# Patient Record
Sex: FEMALE | Race: BLACK OR AFRICAN AMERICAN | NOT HISPANIC OR LATINO | Employment: OTHER | URBAN - METROPOLITAN AREA
[De-identification: names, ages, dates, MRNs, and addresses within clinical notes are randomized per-mention and may not be internally consistent; named-entity substitution may affect disease eponyms.]

---

## 2021-01-01 ENCOUNTER — ANESTHESIA EVENT (OUTPATIENT)
Dept: OBSERVATION | Facility: HOSPITAL | Age: 76
DRG: 252 | End: 2021-01-01
Payer: MEDICARE

## 2021-01-01 ENCOUNTER — ANESTHESIA EVENT (OUTPATIENT)
Dept: SURGERY | Facility: HOSPITAL | Age: 76
DRG: 252 | End: 2021-01-01
Payer: MEDICARE

## 2021-01-01 ENCOUNTER — LAB VISIT (OUTPATIENT)
Dept: LAB | Facility: OTHER | Age: 76
End: 2021-01-01
Payer: MEDICARE

## 2021-01-01 ENCOUNTER — HOSPITAL ENCOUNTER (EMERGENCY)
Facility: HOSPITAL | Age: 76
Discharge: HOME | End: 2021-09-01
Attending: EMERGENCY MEDICINE
Payer: MEDICARE

## 2021-01-01 ENCOUNTER — ANESTHESIA (OUTPATIENT)
Dept: SURGERY | Facility: HOSPITAL | Age: 76
DRG: 252 | End: 2021-01-01
Payer: MEDICARE

## 2021-01-01 ENCOUNTER — DOCUMENTATION ONLY (OUTPATIENT)
Dept: ORTHOPEDICS | Facility: HOSPITAL | Age: 76
End: 2021-01-01

## 2021-01-01 ENCOUNTER — HOSPITAL ENCOUNTER (OUTPATIENT)
Facility: OTHER | Age: 76
Discharge: HOME | End: 2021-07-27
Attending: EMERGENCY MEDICINE | Admitting: EMERGENCY MEDICINE
Payer: MEDICARE

## 2021-01-01 ENCOUNTER — HOSPITAL ENCOUNTER (OUTPATIENT)
Facility: HOSPITAL | Age: 76
Discharge: NURSING, REHAB OR LONG-TERM CARE | End: 2021-09-17
Attending: EMERGENCY MEDICINE | Admitting: INTERNAL MEDICINE
Payer: MEDICARE

## 2021-01-01 ENCOUNTER — ANESTHESIA (OUTPATIENT)
Dept: OBSERVATION | Facility: HOSPITAL | Age: 76
DRG: 252 | End: 2021-01-01
Payer: MEDICARE

## 2021-01-01 ENCOUNTER — HOSPITAL ENCOUNTER (EMERGENCY)
Facility: HOSPITAL | Age: 76
Discharge: HOME | End: 2021-09-09
Attending: EMERGENCY MEDICINE
Payer: MEDICARE

## 2021-01-01 ENCOUNTER — HOSPITAL ENCOUNTER (OUTPATIENT)
Facility: HOSPITAL | Age: 76
Discharge: HOME HEALTH | End: 2021-06-22
Attending: EMERGENCY MEDICINE | Admitting: INTERNAL MEDICINE
Payer: MEDICARE

## 2021-01-01 ENCOUNTER — HOSPITAL ENCOUNTER (INPATIENT)
Facility: HOSPITAL | Age: 76
LOS: 2 days | Discharge: NURSING, REHAB OR LONG-TERM CARE | DRG: 252 | End: 2021-05-28
Attending: EMERGENCY MEDICINE | Admitting: HOSPITALIST
Payer: MEDICARE

## 2021-01-01 ENCOUNTER — EXTERNAL HOME HEALTH (OUTPATIENT)
Dept: HOME HEALTH SERVICES | Facility: HOSPITAL | Age: 76
End: 2021-01-01
Payer: MEDICARE

## 2021-01-01 VITALS
RESPIRATION RATE: 20 BRPM | SYSTOLIC BLOOD PRESSURE: 122 MMHG | OXYGEN SATURATION: 96 % | TEMPERATURE: 98 F | BODY MASS INDEX: 28.95 KG/M2 | HEIGHT: 64 IN | WEIGHT: 169.56 LBS | HEART RATE: 87 BPM | DIASTOLIC BLOOD PRESSURE: 57 MMHG

## 2021-01-01 VITALS
BODY MASS INDEX: 24.15 KG/M2 | SYSTOLIC BLOOD PRESSURE: 125 MMHG | OXYGEN SATURATION: 98 % | HEART RATE: 79 BPM | WEIGHT: 123 LBS | RESPIRATION RATE: 20 BRPM | HEIGHT: 60 IN | DIASTOLIC BLOOD PRESSURE: 68 MMHG | TEMPERATURE: 98 F

## 2021-01-01 VITALS
RESPIRATION RATE: 11 BRPM | BODY MASS INDEX: 24.11 KG/M2 | HEIGHT: 60 IN | WEIGHT: 122.81 LBS | OXYGEN SATURATION: 100 % | SYSTOLIC BLOOD PRESSURE: 104 MMHG | HEART RATE: 75 BPM | DIASTOLIC BLOOD PRESSURE: 53 MMHG | TEMPERATURE: 98 F

## 2021-01-01 VITALS
BODY MASS INDEX: 31.73 KG/M2 | TEMPERATURE: 99 F | DIASTOLIC BLOOD PRESSURE: 61 MMHG | SYSTOLIC BLOOD PRESSURE: 132 MMHG | OXYGEN SATURATION: 97 % | HEIGHT: 64 IN | RESPIRATION RATE: 18 BRPM | HEART RATE: 94 BPM | WEIGHT: 185.88 LBS

## 2021-01-01 VITALS
RESPIRATION RATE: 20 BRPM | TEMPERATURE: 98 F | OXYGEN SATURATION: 100 % | SYSTOLIC BLOOD PRESSURE: 165 MMHG | HEART RATE: 82 BPM | DIASTOLIC BLOOD PRESSURE: 73 MMHG

## 2021-01-01 VITALS
OXYGEN SATURATION: 99 % | TEMPERATURE: 98 F | SYSTOLIC BLOOD PRESSURE: 144 MMHG | RESPIRATION RATE: 16 BRPM | HEART RATE: 66 BPM | WEIGHT: 149.94 LBS | DIASTOLIC BLOOD PRESSURE: 65 MMHG | BODY MASS INDEX: 26.57 KG/M2 | HEIGHT: 63 IN

## 2021-01-01 DIAGNOSIS — N25.81 SECONDARY HYPERPARATHYROIDISM OF RENAL ORIGIN: Chronic | ICD-10-CM

## 2021-01-01 DIAGNOSIS — M25.511 RIGHT SHOULDER PAIN: ICD-10-CM

## 2021-01-01 DIAGNOSIS — Z20.822 ENCOUNTER FOR LABORATORY TESTING FOR COVID-19 VIRUS: ICD-10-CM

## 2021-01-01 DIAGNOSIS — N18.6 ANEMIA DUE TO END STAGE RENAL DISEASE: Chronic | ICD-10-CM

## 2021-01-01 DIAGNOSIS — G93.40 ACUTE ENCEPHALOPATHY: ICD-10-CM

## 2021-01-01 DIAGNOSIS — E83.9 CHRONIC KIDNEY DISEASE-MINERAL AND BONE DISORDER: ICD-10-CM

## 2021-01-01 DIAGNOSIS — K21.9 GASTROESOPHAGEAL REFLUX DISEASE, UNSPECIFIED WHETHER ESOPHAGITIS PRESENT: Chronic | ICD-10-CM

## 2021-01-01 DIAGNOSIS — T82.590A AV GRAFT MALFUNCTION, INITIAL ENCOUNTER: Primary | ICD-10-CM

## 2021-01-01 DIAGNOSIS — T82.868A AV GRAFT THROMBOSIS: ICD-10-CM

## 2021-01-01 DIAGNOSIS — M25.512 LEFT SHOULDER PAIN: ICD-10-CM

## 2021-01-01 DIAGNOSIS — Z99.2 ESRD ON HEMODIALYSIS: Chronic | ICD-10-CM

## 2021-01-01 DIAGNOSIS — N18.6 END-STAGE RENAL DISEASE ON HEMODIALYSIS: Primary | ICD-10-CM

## 2021-01-01 DIAGNOSIS — E87.5 HYPERKALEMIA: ICD-10-CM

## 2021-01-01 DIAGNOSIS — N18.6 ESRD ON HEMODIALYSIS: Chronic | ICD-10-CM

## 2021-01-01 DIAGNOSIS — I10 ESSENTIAL HYPERTENSION: Chronic | ICD-10-CM

## 2021-01-01 DIAGNOSIS — T82.868A THROMBOSIS OF ARTERIOVENOUS GRAFT, INITIAL ENCOUNTER: ICD-10-CM

## 2021-01-01 DIAGNOSIS — I50.32 CHRONIC DIASTOLIC CONGESTIVE HEART FAILURE: ICD-10-CM

## 2021-01-01 DIAGNOSIS — N18.6 TYPE 2 DIABETES MELLITUS WITH CHRONIC KIDNEY DISEASE ON CHRONIC DIALYSIS, WITH LONG-TERM CURRENT USE OF INSULIN: Chronic | ICD-10-CM

## 2021-01-01 DIAGNOSIS — Z99.2 END-STAGE RENAL DISEASE ON HEMODIALYSIS: ICD-10-CM

## 2021-01-01 DIAGNOSIS — Z78.9 PROBLEM WITH VASCULAR ACCESS: ICD-10-CM

## 2021-01-01 DIAGNOSIS — E78.5 DYSLIPIDEMIA: Chronic | ICD-10-CM

## 2021-01-01 DIAGNOSIS — D63.1 ANEMIA OF CHRONIC RENAL FAILURE, STAGE 5: ICD-10-CM

## 2021-01-01 DIAGNOSIS — Z79.4 TYPE 2 DIABETES MELLITUS WITH CHRONIC KIDNEY DISEASE ON CHRONIC DIALYSIS, WITH LONG-TERM CURRENT USE OF INSULIN: Chronic | ICD-10-CM

## 2021-01-01 DIAGNOSIS — M25.519 SHOULDER PAIN: ICD-10-CM

## 2021-01-01 DIAGNOSIS — N18.6 ESRD (END STAGE RENAL DISEASE): ICD-10-CM

## 2021-01-01 DIAGNOSIS — R07.9 CHEST PAIN: ICD-10-CM

## 2021-01-01 DIAGNOSIS — Z91.89: ICD-10-CM

## 2021-01-01 DIAGNOSIS — S42.294A OTHER CLOSED NONDISPLACED FRACTURE OF PROXIMAL END OF RIGHT HUMERUS, INITIAL ENCOUNTER: ICD-10-CM

## 2021-01-01 DIAGNOSIS — I25.2 HISTORY OF MYOCARDIAL INFARCTION: Chronic | ICD-10-CM

## 2021-01-01 DIAGNOSIS — D63.1 ANEMIA DUE TO END STAGE RENAL DISEASE: Chronic | ICD-10-CM

## 2021-01-01 DIAGNOSIS — E87.5 HYPERKALEMIA: Primary | ICD-10-CM

## 2021-01-01 DIAGNOSIS — N18.6 ESRD (END STAGE RENAL DISEASE): Chronic | ICD-10-CM

## 2021-01-01 DIAGNOSIS — N18.5 ANEMIA OF CHRONIC RENAL FAILURE, STAGE 5: ICD-10-CM

## 2021-01-01 DIAGNOSIS — Z99.2 HEMODIALYSIS PATIENT: ICD-10-CM

## 2021-01-01 DIAGNOSIS — Z99.2 END-STAGE RENAL DISEASE ON HEMODIALYSIS: Primary | ICD-10-CM

## 2021-01-01 DIAGNOSIS — T82.838A BLEEDING FROM DIALYSIS SHUNT, INITIAL ENCOUNTER: Primary | ICD-10-CM

## 2021-01-01 DIAGNOSIS — D62 ACUTE BLOOD LOSS ANEMIA: ICD-10-CM

## 2021-01-01 DIAGNOSIS — T82.868S THROMBOSIS OF ARTERIOVENOUS GRAFT, SEQUELA: Chronic | ICD-10-CM

## 2021-01-01 DIAGNOSIS — N18.9 CHRONIC KIDNEY DISEASE-MINERAL AND BONE DISORDER: ICD-10-CM

## 2021-01-01 DIAGNOSIS — T82.898A AV FISTULA OCCLUSION: ICD-10-CM

## 2021-01-01 DIAGNOSIS — R41.82 AMS (ALTERED MENTAL STATUS): ICD-10-CM

## 2021-01-01 DIAGNOSIS — E11.22 TYPE 2 DIABETES MELLITUS WITH CHRONIC KIDNEY DISEASE ON CHRONIC DIALYSIS, WITH LONG-TERM CURRENT USE OF INSULIN: Chronic | ICD-10-CM

## 2021-01-01 DIAGNOSIS — I50.32 CHRONIC DIASTOLIC CONGESTIVE HEART FAILURE: Chronic | ICD-10-CM

## 2021-01-01 DIAGNOSIS — D64.9 ANEMIA, UNSPECIFIED TYPE: Primary | ICD-10-CM

## 2021-01-01 DIAGNOSIS — N18.6 TYPE 2 DIABETES MELLITUS WITH CHRONIC KIDNEY DISEASE ON CHRONIC DIALYSIS, WITHOUT LONG-TERM CURRENT USE OF INSULIN: ICD-10-CM

## 2021-01-01 DIAGNOSIS — E11.22 TYPE 2 DIABETES MELLITUS WITH CHRONIC KIDNEY DISEASE ON CHRONIC DIALYSIS, WITHOUT LONG-TERM CURRENT USE OF INSULIN: ICD-10-CM

## 2021-01-01 DIAGNOSIS — Z99.2 TYPE 2 DIABETES MELLITUS WITH CHRONIC KIDNEY DISEASE ON CHRONIC DIALYSIS, WITH LONG-TERM CURRENT USE OF INSULIN: Chronic | ICD-10-CM

## 2021-01-01 DIAGNOSIS — M89.9 CHRONIC KIDNEY DISEASE-MINERAL AND BONE DISORDER: ICD-10-CM

## 2021-01-01 DIAGNOSIS — Z99.2 TYPE 2 DIABETES MELLITUS WITH CHRONIC KIDNEY DISEASE ON CHRONIC DIALYSIS, WITHOUT LONG-TERM CURRENT USE OF INSULIN: ICD-10-CM

## 2021-01-01 DIAGNOSIS — N18.6 END-STAGE RENAL DISEASE ON HEMODIALYSIS: ICD-10-CM

## 2021-01-01 LAB
ABO + RH BLD: NORMAL
ABO + RH BLD: NORMAL
ACID FAST MOD KINY STN SPEC: NORMAL
ALBUMIN SERPL BCP-MCNC: 2.5 G/DL (ref 3.5–5.2)
ALBUMIN SERPL BCP-MCNC: 2.5 G/DL (ref 3.5–5.2)
ALBUMIN SERPL BCP-MCNC: 2.6 G/DL (ref 3.5–5.2)
ALBUMIN SERPL BCP-MCNC: 2.7 G/DL (ref 3.5–5.2)
ALBUMIN SERPL BCP-MCNC: 2.7 G/DL (ref 3.5–5.2)
ALBUMIN SERPL BCP-MCNC: 2.8 G/DL (ref 3.5–5.2)
ALBUMIN SERPL BCP-MCNC: 2.9 G/DL (ref 3.5–5.2)
ALBUMIN SERPL BCP-MCNC: 3 G/DL (ref 3.5–5.2)
ALBUMIN SERPL BCP-MCNC: 3.1 G/DL (ref 3.5–5.2)
ALLENS TEST: ABNORMAL
ALLENS TEST: ABNORMAL
ALP SERPL-CCNC: 100 U/L (ref 55–135)
ALP SERPL-CCNC: 111 U/L (ref 55–135)
ALP SERPL-CCNC: 79 U/L (ref 55–135)
ALP SERPL-CCNC: 83 U/L (ref 55–135)
ALP SERPL-CCNC: 86 U/L (ref 55–135)
ALP SERPL-CCNC: 91 U/L (ref 55–135)
ALT SERPL W/O P-5'-P-CCNC: 8 U/L (ref 10–44)
ALT SERPL W/O P-5'-P-CCNC: <5 U/L (ref 10–44)
AMMONIA PLAS-SCNC: 23 UMOL/L (ref 10–50)
AMPHET+METHAMPHET UR QL: NEGATIVE
ANION GAP SERPL CALC-SCNC: 10 MMOL/L (ref 8–16)
ANION GAP SERPL CALC-SCNC: 11 MMOL/L (ref 8–16)
ANION GAP SERPL CALC-SCNC: 11 MMOL/L (ref 8–16)
ANION GAP SERPL CALC-SCNC: 12 MMOL/L (ref 8–16)
ANION GAP SERPL CALC-SCNC: 13 MMOL/L (ref 8–16)
ANION GAP SERPL CALC-SCNC: 14 MMOL/L (ref 8–16)
ANION GAP SERPL CALC-SCNC: 15 MMOL/L (ref 8–16)
ANION GAP SERPL CALC-SCNC: 16 MMOL/L (ref 8–16)
ANION GAP SERPL CALC-SCNC: 16 MMOL/L (ref 8–16)
ANION GAP SERPL CALC-SCNC: 17 MMOL/L (ref 8–16)
ANION GAP SERPL CALC-SCNC: 18 MMOL/L (ref 8–16)
ANION GAP SERPL CALC-SCNC: 18 MMOL/L (ref 8–16)
ANION GAP SERPL CALC-SCNC: 19 MMOL/L (ref 8–16)
ANISOCYTOSIS BLD QL SMEAR: SLIGHT
APPEARANCE FLD: NORMAL
AST SERPL-CCNC: 10 U/L (ref 10–40)
AST SERPL-CCNC: 11 U/L (ref 10–40)
AST SERPL-CCNC: 14 U/L (ref 10–40)
AST SERPL-CCNC: 5 U/L (ref 10–40)
AST SERPL-CCNC: 8 U/L (ref 10–40)
AST SERPL-CCNC: 9 U/L (ref 10–40)
BACTERIA #/AREA URNS AUTO: ABNORMAL /HPF
BACTERIA BLD CULT: ABNORMAL
BACTERIA BLD CULT: NORMAL
BACTERIA SPEC AEROBE CULT: NO GROWTH
BACTERIA SPEC ANAEROBE CULT: ABNORMAL
BACTERIA UR CULT: ABNORMAL
BARBITURATES UR QL SCN>200 NG/ML: NEGATIVE
BASOPHILS # BLD AUTO: 0.02 K/UL (ref 0–0.2)
BASOPHILS # BLD AUTO: 0.02 K/UL (ref 0–0.2)
BASOPHILS # BLD AUTO: 0.03 K/UL (ref 0–0.2)
BASOPHILS # BLD AUTO: 0.04 K/UL (ref 0–0.2)
BASOPHILS # BLD AUTO: 0.05 K/UL (ref 0–0.2)
BASOPHILS # BLD AUTO: 0.06 K/UL (ref 0–0.2)
BASOPHILS NFR BLD: 0.4 % (ref 0–1.9)
BASOPHILS NFR BLD: 0.5 % (ref 0–1.9)
BASOPHILS NFR BLD: 0.6 % (ref 0–1.9)
BASOPHILS NFR BLD: 0.7 % (ref 0–1.9)
BASOPHILS NFR BLD: 0.8 % (ref 0–1.9)
BASOPHILS NFR BLD: 0.9 % (ref 0–1.9)
BASOPHILS NFR BLD: 1 % (ref 0–1.9)
BASOPHILS NFR BLD: 1.1 % (ref 0–1.9)
BENZODIAZ UR QL SCN>200 NG/ML: NEGATIVE
BILIRUB SERPL-MCNC: 0.3 MG/DL (ref 0.1–1)
BILIRUB SERPL-MCNC: 0.4 MG/DL (ref 0.1–1)
BILIRUB SERPL-MCNC: 0.5 MG/DL (ref 0.1–1)
BILIRUB UR QL STRIP: NEGATIVE
BLD GP AB SCN CELLS X3 SERPL QL: NORMAL
BLD GP AB SCN CELLS X3 SERPL QL: NORMAL
BLD PROD TYP BPU: NORMAL
BLD PROD TYP BPU: NORMAL
BLOOD UNIT EXPIRATION DATE: NORMAL
BLOOD UNIT EXPIRATION DATE: NORMAL
BLOOD UNIT TYPE CODE: 6200
BLOOD UNIT TYPE CODE: 6200
BLOOD UNIT TYPE: NORMAL
BLOOD UNIT TYPE: NORMAL
BNP SERPL-MCNC: 2647 PG/ML (ref 0–99)
BODY FLD TYPE: NORMAL
BODY FLD TYPE: NORMAL
BUN SERPL-MCNC: 20 MG/DL (ref 8–23)
BUN SERPL-MCNC: 25 MG/DL (ref 8–23)
BUN SERPL-MCNC: 25 MG/DL (ref 8–23)
BUN SERPL-MCNC: 27 MG/DL (ref 8–23)
BUN SERPL-MCNC: 28 MG/DL (ref 8–23)
BUN SERPL-MCNC: 32 MG/DL (ref 8–23)
BUN SERPL-MCNC: 33 MG/DL (ref 8–23)
BUN SERPL-MCNC: 34 MG/DL (ref 8–23)
BUN SERPL-MCNC: 38 MG/DL (ref 8–23)
BUN SERPL-MCNC: 42 MG/DL (ref 8–23)
BUN SERPL-MCNC: 43 MG/DL (ref 8–23)
BUN SERPL-MCNC: 44 MG/DL (ref 6–30)
BUN SERPL-MCNC: 44 MG/DL (ref 8–23)
BUN SERPL-MCNC: 44 MG/DL (ref 8–23)
BUN SERPL-MCNC: 45 MG/DL (ref 8–23)
BUN SERPL-MCNC: 45 MG/DL (ref 8–23)
BUN SERPL-MCNC: 47 MG/DL (ref 8–23)
BUN SERPL-MCNC: 48 MG/DL (ref 8–23)
BUN SERPL-MCNC: 66 MG/DL (ref 6–30)
BUN SERPL-MCNC: 67 MG/DL (ref 8–23)
BUN SERPL-MCNC: 67 MG/DL (ref 8–23)
BUN SERPL-MCNC: 75 MG/DL (ref 8–23)
BUN SERPL-MCNC: 78 MG/DL (ref 8–23)
BURR CELLS BLD QL SMEAR: ABNORMAL
BZE UR QL SCN: NEGATIVE
CALCIUM SERPL-MCNC: 7.9 MG/DL (ref 8.7–10.5)
CALCIUM SERPL-MCNC: 8 MG/DL (ref 8.7–10.5)
CALCIUM SERPL-MCNC: 8.5 MG/DL (ref 8.7–10.5)
CALCIUM SERPL-MCNC: 8.5 MG/DL (ref 8.7–10.5)
CALCIUM SERPL-MCNC: 8.6 MG/DL (ref 8.7–10.5)
CALCIUM SERPL-MCNC: 8.7 MG/DL (ref 8.7–10.5)
CALCIUM SERPL-MCNC: 8.7 MG/DL (ref 8.7–10.5)
CALCIUM SERPL-MCNC: 8.8 MG/DL (ref 8.7–10.5)
CALCIUM SERPL-MCNC: 8.9 MG/DL (ref 8.7–10.5)
CALCIUM SERPL-MCNC: 9 MG/DL (ref 8.7–10.5)
CALCIUM SERPL-MCNC: 9.1 MG/DL (ref 8.7–10.5)
CALCIUM SERPL-MCNC: 9.2 MG/DL (ref 8.7–10.5)
CALCIUM SERPL-MCNC: 9.3 MG/DL (ref 8.7–10.5)
CALCIUM SERPL-MCNC: 9.3 MG/DL (ref 8.7–10.5)
CALCIUM SERPL-MCNC: 9.4 MG/DL (ref 8.7–10.5)
CALCIUM SERPL-MCNC: 9.5 MG/DL (ref 8.7–10.5)
CANNABINOIDS UR QL SCN: NEGATIVE
CHLORIDE SERPL-SCNC: 100 MMOL/L (ref 95–110)
CHLORIDE SERPL-SCNC: 101 MMOL/L (ref 95–110)
CHLORIDE SERPL-SCNC: 102 MMOL/L (ref 95–110)
CHLORIDE SERPL-SCNC: 103 MMOL/L (ref 95–110)
CHLORIDE SERPL-SCNC: 104 MMOL/L (ref 95–110)
CHLORIDE SERPL-SCNC: 105 MMOL/L (ref 95–110)
CHLORIDE SERPL-SCNC: 106 MMOL/L (ref 95–110)
CHLORIDE SERPL-SCNC: 107 MMOL/L (ref 95–110)
CHLORIDE SERPL-SCNC: 97 MMOL/L (ref 95–110)
CHLORIDE SERPL-SCNC: 98 MMOL/L (ref 95–110)
CHLORIDE SERPL-SCNC: 98 MMOL/L (ref 95–110)
CHLORIDE SERPL-SCNC: 99 MMOL/L (ref 95–110)
CHLORIDE SERPL-SCNC: 99 MMOL/L (ref 95–110)
CLARITY UR REFRACT.AUTO: ABNORMAL
CO2 SERPL-SCNC: 19 MMOL/L (ref 23–29)
CO2 SERPL-SCNC: 20 MMOL/L (ref 23–29)
CO2 SERPL-SCNC: 21 MMOL/L (ref 23–29)
CO2 SERPL-SCNC: 22 MMOL/L (ref 23–29)
CO2 SERPL-SCNC: 22 MMOL/L (ref 23–29)
CO2 SERPL-SCNC: 23 MMOL/L (ref 23–29)
CO2 SERPL-SCNC: 24 MMOL/L (ref 23–29)
CO2 SERPL-SCNC: 25 MMOL/L (ref 23–29)
CO2 SERPL-SCNC: 25 MMOL/L (ref 23–29)
CO2 SERPL-SCNC: 26 MMOL/L (ref 23–29)
CO2 SERPL-SCNC: 27 MMOL/L (ref 23–29)
CO2 SERPL-SCNC: 28 MMOL/L (ref 23–29)
CO2 SERPL-SCNC: 30 MMOL/L (ref 23–29)
CODING SYSTEM: NORMAL
CODING SYSTEM: NORMAL
COLOR FLD: NORMAL
COLOR UR AUTO: YELLOW
CREAT SERPL-MCNC: 11.2 MG/DL (ref 0.5–1.4)
CREAT SERPL-MCNC: 11.6 MG/DL (ref 0.5–1.4)
CREAT SERPL-MCNC: 11.9 MG/DL (ref 0.5–1.4)
CREAT SERPL-MCNC: 12 MG/DL (ref 0.5–1.4)
CREAT SERPL-MCNC: 12.5 MG/DL (ref 0.5–1.4)
CREAT SERPL-MCNC: 4.9 MG/DL (ref 0.5–1.4)
CREAT SERPL-MCNC: 5.6 MG/DL (ref 0.5–1.4)
CREAT SERPL-MCNC: 5.8 MG/DL (ref 0.5–1.4)
CREAT SERPL-MCNC: 5.9 MG/DL (ref 0.5–1.4)
CREAT SERPL-MCNC: 6.2 MG/DL (ref 0.5–1.4)
CREAT SERPL-MCNC: 6.9 MG/DL (ref 0.5–1.4)
CREAT SERPL-MCNC: 6.9 MG/DL (ref 0.5–1.4)
CREAT SERPL-MCNC: 7 MG/DL (ref 0.5–1.4)
CREAT SERPL-MCNC: 7.5 MG/DL (ref 0.5–1.4)
CREAT SERPL-MCNC: 7.8 MG/DL (ref 0.5–1.4)
CREAT SERPL-MCNC: 8 MG/DL (ref 0.5–1.4)
CREAT SERPL-MCNC: 8.5 MG/DL (ref 0.5–1.4)
CREAT SERPL-MCNC: 8.5 MG/DL (ref 0.5–1.4)
CREAT SERPL-MCNC: 8.9 MG/DL (ref 0.5–1.4)
CREAT SERPL-MCNC: 9 MG/DL (ref 0.5–1.4)
CREAT SERPL-MCNC: 9.1 MG/DL (ref 0.5–1.4)
CREAT SERPL-MCNC: 9.3 MG/DL (ref 0.5–1.4)
CREAT SERPL-MCNC: 9.7 MG/DL (ref 0.5–1.4)
CREAT UR-MCNC: <5 MG/DL (ref 15–325)
CRP SERPL-MCNC: 79.4 MG/L (ref 0–8.2)
CRYSTALS FLD MICRO: NEGATIVE
CTP QC/QA: YES
DIFFERENTIAL METHOD: ABNORMAL
DISPENSE STATUS: NORMAL
DISPENSE STATUS: NORMAL
EOSINOPHIL # BLD AUTO: 0.2 K/UL (ref 0–0.5)
EOSINOPHIL # BLD AUTO: 0.3 K/UL (ref 0–0.5)
EOSINOPHIL # BLD AUTO: 0.4 K/UL (ref 0–0.5)
EOSINOPHIL # BLD AUTO: 0.4 K/UL (ref 0–0.5)
EOSINOPHIL NFR BLD: 2.3 % (ref 0–8)
EOSINOPHIL NFR BLD: 3.2 % (ref 0–8)
EOSINOPHIL NFR BLD: 3.6 % (ref 0–8)
EOSINOPHIL NFR BLD: 4 % (ref 0–8)
EOSINOPHIL NFR BLD: 4.1 % (ref 0–8)
EOSINOPHIL NFR BLD: 4.2 % (ref 0–8)
EOSINOPHIL NFR BLD: 4.6 % (ref 0–8)
EOSINOPHIL NFR BLD: 4.7 % (ref 0–8)
EOSINOPHIL NFR BLD: 4.7 % (ref 0–8)
EOSINOPHIL NFR BLD: 4.8 % (ref 0–8)
EOSINOPHIL NFR BLD: 5.1 % (ref 0–8)
EOSINOPHIL NFR BLD: 5.3 % (ref 0–8)
EOSINOPHIL NFR BLD: 5.9 % (ref 0–8)
EOSINOPHIL NFR BLD: 6 % (ref 0–8)
EOSINOPHIL NFR BLD: 6.1 % (ref 0–8)
EOSINOPHIL NFR BLD: 6.4 % (ref 0–8)
EOSINOPHIL NFR BLD: 7 % (ref 0–8)
EOSINOPHIL NFR BLD: 7.1 % (ref 0–8)
EOSINOPHIL NFR BLD: 7.5 % (ref 0–8)
EOSINOPHIL NFR BLD: 8 % (ref 0–8)
EOSINOPHIL NFR FLD MANUAL: 12 %
ERYTHROCYTE [DISTWIDTH] IN BLOOD BY AUTOMATED COUNT: 13.9 % (ref 11.5–14.5)
ERYTHROCYTE [DISTWIDTH] IN BLOOD BY AUTOMATED COUNT: 14.2 % (ref 11.5–14.5)
ERYTHROCYTE [DISTWIDTH] IN BLOOD BY AUTOMATED COUNT: 14.2 % (ref 11.5–14.5)
ERYTHROCYTE [DISTWIDTH] IN BLOOD BY AUTOMATED COUNT: 14.3 % (ref 11.5–14.5)
ERYTHROCYTE [DISTWIDTH] IN BLOOD BY AUTOMATED COUNT: 14.5 % (ref 11.5–14.5)
ERYTHROCYTE [DISTWIDTH] IN BLOOD BY AUTOMATED COUNT: 14.9 % (ref 11.5–14.5)
ERYTHROCYTE [DISTWIDTH] IN BLOOD BY AUTOMATED COUNT: 15.1 % (ref 11.5–14.5)
ERYTHROCYTE [DISTWIDTH] IN BLOOD BY AUTOMATED COUNT: 15.2 % (ref 11.5–14.5)
ERYTHROCYTE [DISTWIDTH] IN BLOOD BY AUTOMATED COUNT: 15.2 % (ref 11.5–14.5)
ERYTHROCYTE [DISTWIDTH] IN BLOOD BY AUTOMATED COUNT: 15.3 % (ref 11.5–14.5)
ERYTHROCYTE [DISTWIDTH] IN BLOOD BY AUTOMATED COUNT: 15.5 % (ref 11.5–14.5)
ERYTHROCYTE [DISTWIDTH] IN BLOOD BY AUTOMATED COUNT: 15.7 % (ref 11.5–14.5)
ERYTHROCYTE [DISTWIDTH] IN BLOOD BY AUTOMATED COUNT: 15.8 % (ref 11.5–14.5)
ERYTHROCYTE [DISTWIDTH] IN BLOOD BY AUTOMATED COUNT: 15.9 % (ref 11.5–14.5)
ERYTHROCYTE [DISTWIDTH] IN BLOOD BY AUTOMATED COUNT: 15.9 % (ref 11.5–14.5)
ERYTHROCYTE [DISTWIDTH] IN BLOOD BY AUTOMATED COUNT: 16 % (ref 11.5–14.5)
ERYTHROCYTE [DISTWIDTH] IN BLOOD BY AUTOMATED COUNT: 16 % (ref 11.5–14.5)
ERYTHROCYTE [DISTWIDTH] IN BLOOD BY AUTOMATED COUNT: 16.3 % (ref 11.5–14.5)
ERYTHROCYTE [DISTWIDTH] IN BLOOD BY AUTOMATED COUNT: 16.4 % (ref 11.5–14.5)
ERYTHROCYTE [SEDIMENTATION RATE] IN BLOOD BY WESTERGREN METHOD: 94 MM/HR (ref 0–36)
EST. GFR  (AFRICAN AMERICAN): 2.9 ML/MIN/1.73 M^2
EST. GFR  (AFRICAN AMERICAN): 3.1 ML/MIN/1.73 M^2
EST. GFR  (AFRICAN AMERICAN): 3.2 ML/MIN/1.73 M^2
EST. GFR  (AFRICAN AMERICAN): 3.4 ML/MIN/1.73 M^2
EST. GFR  (AFRICAN AMERICAN): 4 ML/MIN/1.73 M^2
EST. GFR  (AFRICAN AMERICAN): 4.2 ML/MIN/1.73 M^2
EST. GFR  (AFRICAN AMERICAN): 4.4 ML/MIN/1.73 M^2
EST. GFR  (AFRICAN AMERICAN): 4.4 ML/MIN/1.73 M^2
EST. GFR  (AFRICAN AMERICAN): 4.7 ML/MIN/1.73 M^2
EST. GFR  (AFRICAN AMERICAN): 5 ML/MIN/1.73 M^2
EST. GFR  (AFRICAN AMERICAN): 5.5 ML/MIN/1.73 M^2
EST. GFR  (AFRICAN AMERICAN): 5.9 ML/MIN/1.73 M^2
EST. GFR  (AFRICAN AMERICAN): 6 ML/MIN/1.73 M^2
EST. GFR  (AFRICAN AMERICAN): 6 ML/MIN/1.73 M^2
EST. GFR  (AFRICAN AMERICAN): 6.9 ML/MIN/1.73 M^2
EST. GFR  (AFRICAN AMERICAN): 7.3 ML/MIN/1.73 M^2
EST. GFR  (AFRICAN AMERICAN): 7.4 ML/MIN/1.73 M^2
EST. GFR  (AFRICAN AMERICAN): 7.8 ML/MIN/1.73 M^2
EST. GFR  (AFRICAN AMERICAN): 9.1 ML/MIN/1.73 M^2
EST. GFR  (NON AFRICAN AMERICAN): 2.6 ML/MIN/1.73 M^2
EST. GFR  (NON AFRICAN AMERICAN): 2.7 ML/MIN/1.73 M^2
EST. GFR  (NON AFRICAN AMERICAN): 2.8 ML/MIN/1.73 M^2
EST. GFR  (NON AFRICAN AMERICAN): 2.9 ML/MIN/1.73 M^2
EST. GFR  (NON AFRICAN AMERICAN): 3.5 ML/MIN/1.73 M^2
EST. GFR  (NON AFRICAN AMERICAN): 3.6 ML/MIN/1.73 M^2
EST. GFR  (NON AFRICAN AMERICAN): 3.8 ML/MIN/1.73 M^2
EST. GFR  (NON AFRICAN AMERICAN): 3.8 ML/MIN/1.73 M^2
EST. GFR  (NON AFRICAN AMERICAN): 4 ML/MIN/1.73 M^2
EST. GFR  (NON AFRICAN AMERICAN): 4.1 ML/MIN/1.73 M^2
EST. GFR  (NON AFRICAN AMERICAN): 4.4 ML/MIN/1.73 M^2
EST. GFR  (NON AFRICAN AMERICAN): 4.7 ML/MIN/1.73 M^2
EST. GFR  (NON AFRICAN AMERICAN): 5 ML/MIN/1.73 M^2
EST. GFR  (NON AFRICAN AMERICAN): 5 ML/MIN/1.73 M^2
EST. GFR  (NON AFRICAN AMERICAN): 5.1 ML/MIN/1.73 M^2
EST. GFR  (NON AFRICAN AMERICAN): 5.2 ML/MIN/1.73 M^2
EST. GFR  (NON AFRICAN AMERICAN): 6 ML/MIN/1.73 M^2
EST. GFR  (NON AFRICAN AMERICAN): 6.3 ML/MIN/1.73 M^2
EST. GFR  (NON AFRICAN AMERICAN): 6.5 ML/MIN/1.73 M^2
EST. GFR  (NON AFRICAN AMERICAN): 6.7 ML/MIN/1.73 M^2
EST. GFR  (NON AFRICAN AMERICAN): 7.9 ML/MIN/1.73 M^2
ESTIMATED AVG GLUCOSE: 123 MG/DL (ref 68–131)
FUNGUS SPEC CULT: NORMAL
GLUCOSE SERPL-MCNC: 104 MG/DL (ref 70–110)
GLUCOSE SERPL-MCNC: 108 MG/DL (ref 70–110)
GLUCOSE SERPL-MCNC: 109 MG/DL (ref 70–110)
GLUCOSE SERPL-MCNC: 111 MG/DL (ref 70–110)
GLUCOSE SERPL-MCNC: 115 MG/DL (ref 70–110)
GLUCOSE SERPL-MCNC: 122 MG/DL (ref 70–110)
GLUCOSE SERPL-MCNC: 127 MG/DL (ref 70–110)
GLUCOSE SERPL-MCNC: 139 MG/DL (ref 70–110)
GLUCOSE SERPL-MCNC: 141 MG/DL (ref 70–110)
GLUCOSE SERPL-MCNC: 168 MG/DL (ref 70–110)
GLUCOSE SERPL-MCNC: 185 MG/DL (ref 70–110)
GLUCOSE SERPL-MCNC: 212 MG/DL (ref 70–110)
GLUCOSE SERPL-MCNC: 227 MG/DL (ref 70–110)
GLUCOSE SERPL-MCNC: 236 MG/DL (ref 70–110)
GLUCOSE SERPL-MCNC: 47 MG/DL (ref 70–110)
GLUCOSE SERPL-MCNC: 65 MG/DL (ref 70–110)
GLUCOSE SERPL-MCNC: 71 MG/DL (ref 70–110)
GLUCOSE SERPL-MCNC: 78 MG/DL (ref 70–110)
GLUCOSE SERPL-MCNC: 79 MG/DL (ref 70–110)
GLUCOSE SERPL-MCNC: 83 MG/DL (ref 70–110)
GLUCOSE SERPL-MCNC: 87 MG/DL (ref 70–110)
GLUCOSE SERPL-MCNC: 90 MG/DL (ref 70–110)
GLUCOSE SERPL-MCNC: 93 MG/DL (ref 70–110)
GLUCOSE SERPL-MCNC: 97 MG/DL (ref 70–110)
GLUCOSE UR QL STRIP: NEGATIVE
GRAM STN SPEC: NORMAL
GRAM STN SPEC: NORMAL
HBA1C MFR BLD: 5.9 % (ref 4–5.6)
HCO3 UR-SCNC: 25.6 MMOL/L (ref 24–28)
HCO3 UR-SCNC: 28.2 MMOL/L (ref 24–28)
HCT VFR BLD AUTO: 19.8 % (ref 37–48.5)
HCT VFR BLD AUTO: 21.1 % (ref 37–48.5)
HCT VFR BLD AUTO: 21.5 % (ref 37–48.5)
HCT VFR BLD AUTO: 22.3 % (ref 37–48.5)
HCT VFR BLD AUTO: 24.6 % (ref 37–48.5)
HCT VFR BLD AUTO: 24.8 % (ref 37–48.5)
HCT VFR BLD AUTO: 26.9 % (ref 37–48.5)
HCT VFR BLD AUTO: 27.1 % (ref 37–48.5)
HCT VFR BLD AUTO: 27.7 % (ref 37–48.5)
HCT VFR BLD AUTO: 27.7 % (ref 37–48.5)
HCT VFR BLD AUTO: 27.8 % (ref 37–48.5)
HCT VFR BLD AUTO: 28.3 % (ref 37–48.5)
HCT VFR BLD AUTO: 29.5 % (ref 37–48.5)
HCT VFR BLD AUTO: 29.5 % (ref 37–48.5)
HCT VFR BLD AUTO: 30.1 % (ref 37–48.5)
HCT VFR BLD AUTO: 30.7 % (ref 37–48.5)
HCT VFR BLD AUTO: 31.2 % (ref 37–48.5)
HCT VFR BLD AUTO: 31.5 % (ref 37–48.5)
HCT VFR BLD AUTO: 32.7 % (ref 37–48.5)
HCT VFR BLD AUTO: 32.9 % (ref 37–48.5)
HCT VFR BLD AUTO: 38.5 % (ref 37–48.5)
HCT VFR BLD CALC: 22 %PCV (ref 36–54)
HCT VFR BLD CALC: 29 %PCV (ref 36–54)
HCV AB SERPL QL IA: NEGATIVE
HGB BLD-MCNC: 10.2 G/DL (ref 12–16)
HGB BLD-MCNC: 12 G/DL (ref 12–16)
HGB BLD-MCNC: 6.1 G/DL (ref 12–16)
HGB BLD-MCNC: 6.3 G/DL (ref 12–16)
HGB BLD-MCNC: 6.7 G/DL (ref 12–16)
HGB BLD-MCNC: 6.7 G/DL (ref 12–16)
HGB BLD-MCNC: 7.7 G/DL (ref 12–16)
HGB BLD-MCNC: 7.7 G/DL (ref 12–16)
HGB BLD-MCNC: 8.3 G/DL (ref 12–16)
HGB BLD-MCNC: 8.4 G/DL (ref 12–16)
HGB BLD-MCNC: 8.5 G/DL (ref 12–16)
HGB BLD-MCNC: 8.6 G/DL (ref 12–16)
HGB BLD-MCNC: 8.7 G/DL (ref 12–16)
HGB BLD-MCNC: 8.9 G/DL (ref 12–16)
HGB BLD-MCNC: 9.2 G/DL (ref 12–16)
HGB BLD-MCNC: 9.2 G/DL (ref 12–16)
HGB BLD-MCNC: 9.5 G/DL (ref 12–16)
HGB BLD-MCNC: 9.6 G/DL (ref 12–16)
HGB BLD-MCNC: 9.7 G/DL (ref 12–16)
HGB BLD-MCNC: 9.8 G/DL (ref 12–16)
HGB BLD-MCNC: 9.9 G/DL (ref 12–16)
HGB UR QL STRIP: ABNORMAL
HYALINE CASTS UR QL AUTO: 0 /LPF
HYPOCHROMIA BLD QL SMEAR: ABNORMAL
IMM GRANULOCYTES # BLD AUTO: 0.01 K/UL (ref 0–0.04)
IMM GRANULOCYTES # BLD AUTO: 0.02 K/UL (ref 0–0.04)
IMM GRANULOCYTES # BLD AUTO: 0.03 K/UL (ref 0–0.04)
IMM GRANULOCYTES # BLD AUTO: 0.04 K/UL (ref 0–0.04)
IMM GRANULOCYTES # BLD AUTO: 0.05 K/UL (ref 0–0.04)
IMM GRANULOCYTES # BLD AUTO: 0.06 K/UL (ref 0–0.04)
IMM GRANULOCYTES NFR BLD AUTO: 0.2 % (ref 0–0.5)
IMM GRANULOCYTES NFR BLD AUTO: 0.3 % (ref 0–0.5)
IMM GRANULOCYTES NFR BLD AUTO: 0.3 % (ref 0–0.5)
IMM GRANULOCYTES NFR BLD AUTO: 0.4 % (ref 0–0.5)
IMM GRANULOCYTES NFR BLD AUTO: 0.5 % (ref 0–0.5)
IMM GRANULOCYTES NFR BLD AUTO: 0.6 % (ref 0–0.5)
IMM GRANULOCYTES NFR BLD AUTO: 0.7 % (ref 0–0.5)
IMM GRANULOCYTES NFR BLD AUTO: 0.8 % (ref 0–0.5)
KETONES UR QL STRIP: ABNORMAL
LACTATE SERPL-SCNC: 0.8 MMOL/L (ref 0.5–2.2)
LACTATE SERPL-SCNC: 0.8 MMOL/L (ref 0.5–2.2)
LEUKOCYTE ESTERASE UR QL STRIP: ABNORMAL
LYMPHOCYTES # BLD AUTO: 0.7 K/UL (ref 1–4.8)
LYMPHOCYTES # BLD AUTO: 0.8 K/UL (ref 1–4.8)
LYMPHOCYTES # BLD AUTO: 0.9 K/UL (ref 1–4.8)
LYMPHOCYTES # BLD AUTO: 0.9 K/UL (ref 1–4.8)
LYMPHOCYTES # BLD AUTO: 1 K/UL (ref 1–4.8)
LYMPHOCYTES # BLD AUTO: 1.1 K/UL (ref 1–4.8)
LYMPHOCYTES # BLD AUTO: 1.2 K/UL (ref 1–4.8)
LYMPHOCYTES # BLD AUTO: 1.2 K/UL (ref 1–4.8)
LYMPHOCYTES # BLD AUTO: 1.3 K/UL (ref 1–4.8)
LYMPHOCYTES NFR BLD: 13.7 % (ref 18–48)
LYMPHOCYTES NFR BLD: 14.8 % (ref 18–48)
LYMPHOCYTES NFR BLD: 15.4 % (ref 18–48)
LYMPHOCYTES NFR BLD: 16.6 % (ref 18–48)
LYMPHOCYTES NFR BLD: 16.9 % (ref 18–48)
LYMPHOCYTES NFR BLD: 17.5 % (ref 18–48)
LYMPHOCYTES NFR BLD: 18.5 % (ref 18–48)
LYMPHOCYTES NFR BLD: 18.7 % (ref 18–48)
LYMPHOCYTES NFR BLD: 19.7 % (ref 18–48)
LYMPHOCYTES NFR BLD: 20.8 % (ref 18–48)
LYMPHOCYTES NFR BLD: 21.4 % (ref 18–48)
LYMPHOCYTES NFR BLD: 21.4 % (ref 18–48)
LYMPHOCYTES NFR BLD: 21.9 % (ref 18–48)
LYMPHOCYTES NFR BLD: 23.2 % (ref 18–48)
LYMPHOCYTES NFR BLD: 23.4 % (ref 18–48)
LYMPHOCYTES NFR BLD: 25.3 % (ref 18–48)
LYMPHOCYTES NFR BLD: 25.6 % (ref 18–48)
LYMPHOCYTES NFR BLD: 27 % (ref 18–48)
LYMPHOCYTES NFR BLD: 29.5 % (ref 18–48)
LYMPHOCYTES NFR BLD: 9.3 % (ref 18–48)
LYMPHOCYTES NFR FLD MANUAL: 9 %
MAGNESIUM SERPL-MCNC: 2 MG/DL (ref 1.6–2.6)
MAGNESIUM SERPL-MCNC: 2 MG/DL (ref 1.6–2.6)
MAGNESIUM SERPL-MCNC: 2.2 MG/DL (ref 1.6–2.6)
MAGNESIUM SERPL-MCNC: 2.2 MG/DL (ref 1.6–2.6)
MAGNESIUM SERPL-MCNC: 2.3 MG/DL (ref 1.6–2.6)
MAGNESIUM SERPL-MCNC: 2.4 MG/DL (ref 1.6–2.6)
MAGNESIUM SERPL-MCNC: 2.4 MG/DL (ref 1.6–2.6)
MAGNESIUM SERPL-MCNC: 2.5 MG/DL (ref 1.6–2.6)
MCH RBC QN AUTO: 27.6 PG (ref 27–31)
MCH RBC QN AUTO: 28 PG (ref 27–31)
MCH RBC QN AUTO: 28.1 PG (ref 27–31)
MCH RBC QN AUTO: 28.1 PG (ref 27–31)
MCH RBC QN AUTO: 28.2 PG (ref 27–31)
MCH RBC QN AUTO: 28.5 PG (ref 27–31)
MCH RBC QN AUTO: 28.5 PG (ref 27–31)
MCH RBC QN AUTO: 28.9 PG (ref 27–31)
MCH RBC QN AUTO: 29 PG (ref 27–31)
MCH RBC QN AUTO: 29 PG (ref 27–31)
MCH RBC QN AUTO: 29.1 PG (ref 27–31)
MCH RBC QN AUTO: 29.1 PG (ref 27–31)
MCH RBC QN AUTO: 29.2 PG (ref 27–31)
MCH RBC QN AUTO: 29.4 PG (ref 27–31)
MCH RBC QN AUTO: 29.6 PG (ref 27–31)
MCH RBC QN AUTO: 29.6 PG (ref 27–31)
MCH RBC QN AUTO: 29.8 PG (ref 27–31)
MCH RBC QN AUTO: 29.8 PG (ref 27–31)
MCH RBC QN AUTO: 30 PG (ref 27–31)
MCH RBC QN AUTO: 30.2 PG (ref 27–31)
MCH RBC QN AUTO: 30.2 PG (ref 27–31)
MCHC RBC AUTO-ENTMCNC: 29.9 G/DL (ref 32–36)
MCHC RBC AUTO-ENTMCNC: 30 G/DL (ref 32–36)
MCHC RBC AUTO-ENTMCNC: 30 G/DL (ref 32–36)
MCHC RBC AUTO-ENTMCNC: 30.1 G/DL (ref 32–36)
MCHC RBC AUTO-ENTMCNC: 30.4 G/DL (ref 32–36)
MCHC RBC AUTO-ENTMCNC: 30.6 G/DL (ref 32–36)
MCHC RBC AUTO-ENTMCNC: 30.7 G/DL (ref 32–36)
MCHC RBC AUTO-ENTMCNC: 30.8 G/DL (ref 32–36)
MCHC RBC AUTO-ENTMCNC: 30.8 G/DL (ref 32–36)
MCHC RBC AUTO-ENTMCNC: 31 G/DL (ref 32–36)
MCHC RBC AUTO-ENTMCNC: 31.2 G/DL (ref 32–36)
MCHC RBC AUTO-ENTMCNC: 31.3 G/DL (ref 32–36)
MCHC RBC AUTO-ENTMCNC: 31.3 G/DL (ref 32–36)
MCHC RBC AUTO-ENTMCNC: 31.7 G/DL (ref 32–36)
MCHC RBC AUTO-ENTMCNC: 32.1 G/DL (ref 32–36)
MCHC RBC AUTO-ENTMCNC: 32.6 G/DL (ref 32–36)
MCV RBC AUTO: 90 FL (ref 82–98)
MCV RBC AUTO: 90 FL (ref 82–98)
MCV RBC AUTO: 91 FL (ref 82–98)
MCV RBC AUTO: 93 FL (ref 82–98)
MCV RBC AUTO: 94 FL (ref 82–98)
MCV RBC AUTO: 95 FL (ref 82–98)
MCV RBC AUTO: 96 FL (ref 82–98)
MCV RBC AUTO: 97 FL (ref 82–98)
MCV RBC AUTO: 97 FL (ref 82–98)
MCV RBC AUTO: 99 FL (ref 82–98)
METHADONE UR QL SCN>300 NG/ML: NEGATIVE
MICROSCOPIC COMMENT: ABNORMAL
MONOCYTES # BLD AUTO: 0.3 K/UL (ref 0.3–1)
MONOCYTES # BLD AUTO: 0.4 K/UL (ref 0.3–1)
MONOCYTES # BLD AUTO: 0.4 K/UL (ref 0.3–1)
MONOCYTES # BLD AUTO: 0.5 K/UL (ref 0.3–1)
MONOCYTES # BLD AUTO: 0.6 K/UL (ref 0.3–1)
MONOCYTES # BLD AUTO: 0.7 K/UL (ref 0.3–1)
MONOCYTES # BLD AUTO: 0.8 K/UL (ref 0.3–1)
MONOCYTES NFR BLD: 11.4 % (ref 4–15)
MONOCYTES NFR BLD: 11.5 % (ref 4–15)
MONOCYTES NFR BLD: 11.8 % (ref 4–15)
MONOCYTES NFR BLD: 11.8 % (ref 4–15)
MONOCYTES NFR BLD: 11.9 % (ref 4–15)
MONOCYTES NFR BLD: 12.2 % (ref 4–15)
MONOCYTES NFR BLD: 12.3 % (ref 4–15)
MONOCYTES NFR BLD: 12.5 % (ref 4–15)
MONOCYTES NFR BLD: 12.6 % (ref 4–15)
MONOCYTES NFR BLD: 12.9 % (ref 4–15)
MONOCYTES NFR BLD: 13.3 % (ref 4–15)
MONOCYTES NFR BLD: 4.2 % (ref 4–15)
MONOCYTES NFR BLD: 8.4 % (ref 4–15)
MONOCYTES NFR BLD: 8.7 % (ref 4–15)
MONOCYTES NFR BLD: 8.8 % (ref 4–15)
MONOCYTES NFR BLD: 8.9 % (ref 4–15)
MONOCYTES NFR BLD: 8.9 % (ref 4–15)
MONOCYTES NFR BLD: 9.3 % (ref 4–15)
MONOCYTES NFR BLD: 9.7 % (ref 4–15)
MONOCYTES NFR BLD: 9.9 % (ref 4–15)
MONOS+MACROS NFR FLD MANUAL: 13 %
MYCOBACTERIUM SPEC QL CULT: NORMAL
NEUTROPHILS # BLD AUTO: 2 K/UL (ref 1.8–7.7)
NEUTROPHILS # BLD AUTO: 2.3 K/UL (ref 1.8–7.7)
NEUTROPHILS # BLD AUTO: 2.5 K/UL (ref 1.8–7.7)
NEUTROPHILS # BLD AUTO: 2.7 K/UL (ref 1.8–7.7)
NEUTROPHILS # BLD AUTO: 2.8 K/UL (ref 1.8–7.7)
NEUTROPHILS # BLD AUTO: 2.8 K/UL (ref 1.8–7.7)
NEUTROPHILS # BLD AUTO: 2.9 K/UL (ref 1.8–7.7)
NEUTROPHILS # BLD AUTO: 2.9 K/UL (ref 1.8–7.7)
NEUTROPHILS # BLD AUTO: 3.1 K/UL (ref 1.8–7.7)
NEUTROPHILS # BLD AUTO: 3.2 K/UL (ref 1.8–7.7)
NEUTROPHILS # BLD AUTO: 3.5 K/UL (ref 1.8–7.7)
NEUTROPHILS # BLD AUTO: 3.6 K/UL (ref 1.8–7.7)
NEUTROPHILS # BLD AUTO: 3.8 K/UL (ref 1.8–7.7)
NEUTROPHILS # BLD AUTO: 4 K/UL (ref 1.8–7.7)
NEUTROPHILS # BLD AUTO: 4.2 K/UL (ref 1.8–7.7)
NEUTROPHILS # BLD AUTO: 8 K/UL (ref 1.8–7.7)
NEUTROPHILS NFR BLD: 52.9 % (ref 38–73)
NEUTROPHILS NFR BLD: 53 % (ref 38–73)
NEUTROPHILS NFR BLD: 54.4 % (ref 38–73)
NEUTROPHILS NFR BLD: 55.5 % (ref 38–73)
NEUTROPHILS NFR BLD: 58.2 % (ref 38–73)
NEUTROPHILS NFR BLD: 58.7 % (ref 38–73)
NEUTROPHILS NFR BLD: 59.9 % (ref 38–73)
NEUTROPHILS NFR BLD: 62 % (ref 38–73)
NEUTROPHILS NFR BLD: 62.1 % (ref 38–73)
NEUTROPHILS NFR BLD: 62.6 % (ref 38–73)
NEUTROPHILS NFR BLD: 63.2 % (ref 38–73)
NEUTROPHILS NFR BLD: 63.8 % (ref 38–73)
NEUTROPHILS NFR BLD: 64.2 % (ref 38–73)
NEUTROPHILS NFR BLD: 66.7 % (ref 38–73)
NEUTROPHILS NFR BLD: 70.7 % (ref 38–73)
NEUTROPHILS NFR BLD: 71.1 % (ref 38–73)
NEUTROPHILS NFR BLD: 71.1 % (ref 38–73)
NEUTROPHILS NFR BLD: 83 % (ref 38–73)
NEUTROPHILS NFR FLD MANUAL: 66 %
NIACIN SERPL-MCNC: 0.91 UG/ML
NITRITE UR QL STRIP: NEGATIVE
NON-SQ EPI CELLS #/AREA URNS AUTO: 7 /HPF
NRBC BLD-RTO: 0 /100 WBC
OPIATES UR QL SCN: NEGATIVE
OVALOCYTES BLD QL SMEAR: ABNORMAL
PATH INTERP FLD-IMP: NORMAL
PCO2 BLDA: 45.1 MMHG (ref 35–45)
PCO2 BLDA: 55.7 MMHG (ref 35–45)
PCP UR QL SCN>25 NG/ML: NEGATIVE
PH SMN: 7.31 [PH] (ref 7.35–7.45)
PH SMN: 7.36 [PH] (ref 7.35–7.45)
PH UR STRIP: 8 [PH] (ref 5–8)
PHOSPHATE SERPL-MCNC: 2.9 MG/DL (ref 2.7–4.5)
PHOSPHATE SERPL-MCNC: 3.6 MG/DL (ref 2.7–4.5)
PHOSPHATE SERPL-MCNC: 3.7 MG/DL (ref 2.7–4.5)
PHOSPHATE SERPL-MCNC: 3.9 MG/DL (ref 2.7–4.5)
PHOSPHATE SERPL-MCNC: 3.9 MG/DL (ref 2.7–4.5)
PHOSPHATE SERPL-MCNC: 4.3 MG/DL (ref 2.7–4.5)
PHOSPHATE SERPL-MCNC: 4.3 MG/DL (ref 2.7–4.5)
PHOSPHATE SERPL-MCNC: 4.4 MG/DL (ref 2.7–4.5)
PHOSPHATE SERPL-MCNC: 4.9 MG/DL (ref 2.7–4.5)
PHOSPHATE SERPL-MCNC: 4.9 MG/DL (ref 2.7–4.5)
PHOSPHATE SERPL-MCNC: 5 MG/DL (ref 2.7–4.5)
PHOSPHATE SERPL-MCNC: 5.2 MG/DL (ref 2.7–4.5)
PHOSPHATE SERPL-MCNC: 5.5 MG/DL (ref 2.7–4.5)
PHOSPHATE SERPL-MCNC: 5.8 MG/DL (ref 2.7–4.5)
PHOSPHATE SERPL-MCNC: 6 MG/DL (ref 2.7–4.5)
PHOSPHATE SERPL-MCNC: 6.4 MG/DL (ref 2.7–4.5)
PHOSPHATE SERPL-MCNC: 7.9 MG/DL (ref 2.7–4.5)
PLATELET # BLD AUTO: 111 K/UL (ref 150–450)
PLATELET # BLD AUTO: 123 K/UL (ref 150–450)
PLATELET # BLD AUTO: 125 K/UL (ref 150–450)
PLATELET # BLD AUTO: 126 K/UL (ref 150–450)
PLATELET # BLD AUTO: 131 K/UL (ref 150–450)
PLATELET # BLD AUTO: 135 K/UL (ref 150–450)
PLATELET # BLD AUTO: 136 K/UL (ref 150–450)
PLATELET # BLD AUTO: 142 K/UL (ref 150–450)
PLATELET # BLD AUTO: 171 K/UL (ref 150–450)
PLATELET # BLD AUTO: 172 K/UL (ref 150–450)
PLATELET # BLD AUTO: 183 K/UL (ref 150–450)
PLATELET # BLD AUTO: 183 K/UL (ref 150–450)
PLATELET # BLD AUTO: 198 K/UL (ref 150–450)
PLATELET # BLD AUTO: 207 K/UL (ref 150–450)
PLATELET # BLD AUTO: 209 K/UL (ref 150–450)
PLATELET # BLD AUTO: 215 K/UL (ref 150–450)
PLATELET # BLD AUTO: 229 K/UL (ref 150–450)
PLATELET # BLD AUTO: 235 K/UL (ref 150–450)
PLATELET # BLD AUTO: 245 K/UL (ref 150–450)
PMV BLD AUTO: 12.3 FL (ref 9.2–12.9)
PMV BLD AUTO: 12.4 FL (ref 9.2–12.9)
PMV BLD AUTO: 12.4 FL (ref 9.2–12.9)
PMV BLD AUTO: 12.5 FL (ref 9.2–12.9)
PMV BLD AUTO: 12.5 FL (ref 9.2–12.9)
PMV BLD AUTO: 12.6 FL (ref 9.2–12.9)
PMV BLD AUTO: 12.7 FL (ref 9.2–12.9)
PMV BLD AUTO: 12.9 FL (ref 9.2–12.9)
PMV BLD AUTO: 13.2 FL (ref 9.2–12.9)
PMV BLD AUTO: 13.3 FL (ref 9.2–12.9)
PMV BLD AUTO: 13.3 FL (ref 9.2–12.9)
PMV BLD AUTO: 13.4 FL (ref 9.2–12.9)
PMV BLD AUTO: 13.5 FL (ref 9.2–12.9)
PMV BLD AUTO: 13.5 FL (ref 9.2–12.9)
PMV BLD AUTO: 13.6 FL (ref 9.2–12.9)
PMV BLD AUTO: 13.6 FL (ref 9.2–12.9)
PMV BLD AUTO: 13.7 FL (ref 9.2–12.9)
PO2 BLDA: 26 MMHG (ref 40–60)
PO2 BLDA: 28 MMHG (ref 40–60)
POC BE: 0 MMOL/L
POC BE: 2 MMOL/L
POC IONIZED CALCIUM: 1.01 MMOL/L (ref 1.06–1.42)
POC IONIZED CALCIUM: 1.06 MMOL/L (ref 1.06–1.42)
POC SATURATED O2: 42 % (ref 95–100)
POC SATURATED O2: 49 % (ref 95–100)
POC TCO2 (MEASURED): 27 MMOL/L (ref 23–29)
POC TCO2 (MEASURED): 30 MMOL/L (ref 23–29)
POC TCO2: 27 MMOL/L (ref 24–29)
POC TCO2: 30 MMOL/L (ref 24–29)
POCT GLUCOSE: 100 MG/DL (ref 70–110)
POCT GLUCOSE: 101 MG/DL (ref 70–110)
POCT GLUCOSE: 105 MG/DL (ref 70–110)
POCT GLUCOSE: 106 MG/DL (ref 70–110)
POCT GLUCOSE: 109 MG/DL (ref 70–110)
POCT GLUCOSE: 110 MG/DL (ref 70–110)
POCT GLUCOSE: 110 MG/DL (ref 70–110)
POCT GLUCOSE: 111 MG/DL (ref 70–110)
POCT GLUCOSE: 111 MG/DL (ref 70–110)
POCT GLUCOSE: 112 MG/DL (ref 70–110)
POCT GLUCOSE: 114 MG/DL (ref 70–110)
POCT GLUCOSE: 116 MG/DL (ref 70–110)
POCT GLUCOSE: 117 MG/DL (ref 70–110)
POCT GLUCOSE: 118 MG/DL (ref 70–110)
POCT GLUCOSE: 118 MG/DL (ref 70–110)
POCT GLUCOSE: 119 MG/DL (ref 70–110)
POCT GLUCOSE: 123 MG/DL (ref 70–110)
POCT GLUCOSE: 127 MG/DL (ref 70–110)
POCT GLUCOSE: 128 MG/DL (ref 70–110)
POCT GLUCOSE: 129 MG/DL (ref 70–110)
POCT GLUCOSE: 130 MG/DL (ref 70–110)
POCT GLUCOSE: 131 MG/DL (ref 70–110)
POCT GLUCOSE: 131 MG/DL (ref 70–110)
POCT GLUCOSE: 134 MG/DL (ref 70–110)
POCT GLUCOSE: 135 MG/DL (ref 70–110)
POCT GLUCOSE: 136 MG/DL (ref 70–110)
POCT GLUCOSE: 140 MG/DL (ref 70–110)
POCT GLUCOSE: 142 MG/DL (ref 70–110)
POCT GLUCOSE: 144 MG/DL (ref 70–110)
POCT GLUCOSE: 147 MG/DL (ref 70–110)
POCT GLUCOSE: 148 MG/DL (ref 70–110)
POCT GLUCOSE: 150 MG/DL (ref 70–110)
POCT GLUCOSE: 151 MG/DL (ref 70–110)
POCT GLUCOSE: 153 MG/DL (ref 70–110)
POCT GLUCOSE: 154 MG/DL (ref 70–110)
POCT GLUCOSE: 155 MG/DL (ref 70–110)
POCT GLUCOSE: 156 MG/DL (ref 70–110)
POCT GLUCOSE: 159 MG/DL (ref 70–110)
POCT GLUCOSE: 160 MG/DL (ref 70–110)
POCT GLUCOSE: 162 MG/DL (ref 70–110)
POCT GLUCOSE: 163 MG/DL (ref 70–110)
POCT GLUCOSE: 163 MG/DL (ref 70–110)
POCT GLUCOSE: 164 MG/DL (ref 70–110)
POCT GLUCOSE: 165 MG/DL (ref 70–110)
POCT GLUCOSE: 170 MG/DL (ref 70–110)
POCT GLUCOSE: 171 MG/DL (ref 70–110)
POCT GLUCOSE: 173 MG/DL (ref 70–110)
POCT GLUCOSE: 176 MG/DL (ref 70–110)
POCT GLUCOSE: 177 MG/DL (ref 70–110)
POCT GLUCOSE: 178 MG/DL (ref 70–110)
POCT GLUCOSE: 178 MG/DL (ref 70–110)
POCT GLUCOSE: 182 MG/DL (ref 70–110)
POCT GLUCOSE: 189 MG/DL (ref 70–110)
POCT GLUCOSE: 190 MG/DL (ref 70–110)
POCT GLUCOSE: 191 MG/DL (ref 70–110)
POCT GLUCOSE: 193 MG/DL (ref 70–110)
POCT GLUCOSE: 203 MG/DL (ref 70–110)
POCT GLUCOSE: 204 MG/DL (ref 70–110)
POCT GLUCOSE: 207 MG/DL (ref 70–110)
POCT GLUCOSE: 209 MG/DL (ref 70–110)
POCT GLUCOSE: 236 MG/DL (ref 70–110)
POCT GLUCOSE: 243 MG/DL (ref 70–110)
POCT GLUCOSE: 262 MG/DL (ref 70–110)
POCT GLUCOSE: 269 MG/DL (ref 70–110)
POCT GLUCOSE: 285 MG/DL (ref 70–110)
POCT GLUCOSE: 53 MG/DL (ref 70–110)
POCT GLUCOSE: 63 MG/DL (ref 70–110)
POCT GLUCOSE: 65 MG/DL (ref 70–110)
POCT GLUCOSE: 65 MG/DL (ref 70–110)
POCT GLUCOSE: 66 MG/DL (ref 70–110)
POCT GLUCOSE: 72 MG/DL (ref 70–110)
POCT GLUCOSE: 74 MG/DL (ref 70–110)
POCT GLUCOSE: 76 MG/DL (ref 70–110)
POCT GLUCOSE: 80 MG/DL (ref 70–110)
POCT GLUCOSE: 82 MG/DL (ref 70–110)
POCT GLUCOSE: 83 MG/DL (ref 70–110)
POCT GLUCOSE: 87 MG/DL (ref 70–110)
POCT GLUCOSE: 88 MG/DL (ref 70–110)
POCT GLUCOSE: 89 MG/DL (ref 70–110)
POCT GLUCOSE: 98 MG/DL (ref 70–110)
POCT GLUCOSE: 99 MG/DL (ref 70–110)
POIKILOCYTOSIS BLD QL SMEAR: SLIGHT
POLYCHROMASIA BLD QL SMEAR: ABNORMAL
POTASSIUM BLD-SCNC: 4.8 MMOL/L (ref 3.5–5.1)
POTASSIUM BLD-SCNC: 5.4 MMOL/L (ref 3.5–5.1)
POTASSIUM SERPL-SCNC: 3.7 MMOL/L (ref 3.5–5.1)
POTASSIUM SERPL-SCNC: 4.1 MMOL/L (ref 3.5–5.1)
POTASSIUM SERPL-SCNC: 4.2 MMOL/L (ref 3.5–5.1)
POTASSIUM SERPL-SCNC: 4.3 MMOL/L (ref 3.5–5.1)
POTASSIUM SERPL-SCNC: 4.5 MMOL/L (ref 3.5–5.1)
POTASSIUM SERPL-SCNC: 4.6 MMOL/L (ref 3.5–5.1)
POTASSIUM SERPL-SCNC: 4.7 MMOL/L (ref 3.5–5.1)
POTASSIUM SERPL-SCNC: 4.8 MMOL/L (ref 3.5–5.1)
POTASSIUM SERPL-SCNC: 4.9 MMOL/L (ref 3.5–5.1)
POTASSIUM SERPL-SCNC: 5.1 MMOL/L (ref 3.5–5.1)
POTASSIUM SERPL-SCNC: 5.3 MMOL/L (ref 3.5–5.1)
POTASSIUM SERPL-SCNC: 5.5 MMOL/L (ref 3.5–5.1)
POTASSIUM SERPL-SCNC: 5.5 MMOL/L (ref 3.5–5.1)
POTASSIUM SERPL-SCNC: 5.6 MMOL/L (ref 3.5–5.1)
POTASSIUM SERPL-SCNC: 5.7 MMOL/L (ref 3.5–5.1)
POTASSIUM SERPL-SCNC: 5.9 MMOL/L (ref 3.5–5.1)
PROT SERPL-MCNC: 5.6 G/DL (ref 6–8.4)
PROT SERPL-MCNC: 6.2 G/DL (ref 6–8.4)
PROT SERPL-MCNC: 6.2 G/DL (ref 6–8.4)
PROT SERPL-MCNC: 6.4 G/DL (ref 6–8.4)
PROT SERPL-MCNC: 7.2 G/DL (ref 6–8.4)
PROT SERPL-MCNC: 7.3 G/DL (ref 6–8.4)
PROT UR QL STRIP: ABNORMAL
PYRIDOXAL SERPL-MCNC: 3 UG/L (ref 5–50)
RBC # BLD AUTO: 2.18 M/UL (ref 4–5.4)
RBC # BLD AUTO: 2.26 M/UL (ref 4–5.4)
RBC # BLD AUTO: 2.28 M/UL (ref 4–5.4)
RBC # BLD AUTO: 2.3 M/UL (ref 4–5.4)
RBC # BLD AUTO: 2.57 M/UL (ref 4–5.4)
RBC # BLD AUTO: 2.6 M/UL (ref 4–5.4)
RBC # BLD AUTO: 2.91 M/UL (ref 4–5.4)
RBC # BLD AUTO: 2.93 M/UL (ref 4–5.4)
RBC # BLD AUTO: 2.95 M/UL (ref 4–5.4)
RBC # BLD AUTO: 2.99 M/UL (ref 4–5.4)
RBC # BLD AUTO: 3.01 M/UL (ref 4–5.4)
RBC # BLD AUTO: 3.02 M/UL (ref 4–5.4)
RBC # BLD AUTO: 3.16 M/UL (ref 4–5.4)
RBC # BLD AUTO: 3.17 M/UL (ref 4–5.4)
RBC # BLD AUTO: 3.25 M/UL (ref 4–5.4)
RBC # BLD AUTO: 3.26 M/UL (ref 4–5.4)
RBC # BLD AUTO: 3.26 M/UL (ref 4–5.4)
RBC # BLD AUTO: 3.37 M/UL (ref 4–5.4)
RBC # BLD AUTO: 3.49 M/UL (ref 4–5.4)
RBC # BLD AUTO: 3.52 M/UL (ref 4–5.4)
RBC # BLD AUTO: 4.03 M/UL (ref 4–5.4)
RBC #/AREA URNS AUTO: 27 /HPF (ref 0–4)
SAMPLE: ABNORMAL
SARS-COV-2 RDRP RESP QL NAA+PROBE: NEGATIVE
SARS-COV-2 RNA RESP QL NAA+PROBE: NORMAL
SARS-COV-2 RNA RESP QL NAA+PROBE: NORMAL
SARS-COV-2 RNA RESP QL NAA+PROBE: NOT DETECTED
SARS-COV-2- CYCLE NUMBER: -1
SARS-COV-2- CYCLE NUMBER: NORMAL
SCHISTOCYTES BLD QL SMEAR: ABNORMAL
SITE: ABNORMAL
SITE: ABNORMAL
SODIUM BLD-SCNC: 137 MMOL/L (ref 136–145)
SODIUM BLD-SCNC: 139 MMOL/L (ref 136–145)
SODIUM SERPL-SCNC: 134 MMOL/L (ref 136–145)
SODIUM SERPL-SCNC: 136 MMOL/L (ref 136–145)
SODIUM SERPL-SCNC: 138 MMOL/L (ref 136–145)
SODIUM SERPL-SCNC: 138 MMOL/L (ref 136–145)
SODIUM SERPL-SCNC: 139 MMOL/L (ref 136–145)
SODIUM SERPL-SCNC: 140 MMOL/L (ref 136–145)
SODIUM SERPL-SCNC: 141 MMOL/L (ref 136–145)
SODIUM SERPL-SCNC: 142 MMOL/L (ref 136–145)
SODIUM SERPL-SCNC: 143 MMOL/L (ref 136–145)
SODIUM SERPL-SCNC: 144 MMOL/L (ref 136–145)
SP GR UR STRIP: 1.01 (ref 1–1.03)
TEST PERFORMANCE INFO SPEC: NORMAL
TEST PERFORMANCE INFO SPEC: NORMAL
TOXICOLOGY INFORMATION: ABNORMAL
TRANS ERYTHROCYTES VOL PATIENT: NORMAL ML
TRANS ERYTHROCYTES VOL PATIENT: NORMAL ML
TROPONIN I SERPL DL<=0.01 NG/ML-MCNC: 0.03 NG/ML (ref 0–0.03)
TROPONIN I SERPL DL<=0.01 NG/ML-MCNC: 0.06 NG/ML (ref 0–0.03)
URN SPEC COLLECT METH UR: ABNORMAL
WBC # BLD AUTO: 3.66 K/UL (ref 3.9–12.7)
WBC # BLD AUTO: 4.29 K/UL (ref 3.9–12.7)
WBC # BLD AUTO: 4.4 K/UL (ref 3.9–12.7)
WBC # BLD AUTO: 4.62 K/UL (ref 3.9–12.7)
WBC # BLD AUTO: 4.65 K/UL (ref 3.9–12.7)
WBC # BLD AUTO: 4.71 K/UL (ref 3.9–12.7)
WBC # BLD AUTO: 4.78 K/UL (ref 3.9–12.7)
WBC # BLD AUTO: 4.92 K/UL (ref 3.9–12.7)
WBC # BLD AUTO: 5.05 K/UL (ref 3.9–12.7)
WBC # BLD AUTO: 5.18 K/UL (ref 3.9–12.7)
WBC # BLD AUTO: 5.21 K/UL (ref 3.9–12.7)
WBC # BLD AUTO: 5.27 K/UL (ref 3.9–12.7)
WBC # BLD AUTO: 5.34 K/UL (ref 3.9–12.7)
WBC # BLD AUTO: 5.47 K/UL (ref 3.9–12.7)
WBC # BLD AUTO: 5.5 K/UL (ref 3.9–12.7)
WBC # BLD AUTO: 5.5 K/UL (ref 3.9–12.7)
WBC # BLD AUTO: 5.74 K/UL (ref 3.9–12.7)
WBC # BLD AUTO: 5.97 K/UL (ref 3.9–12.7)
WBC # BLD AUTO: 5.98 K/UL (ref 3.9–12.7)
WBC # BLD AUTO: 6.17 K/UL (ref 3.9–12.7)
WBC # BLD AUTO: 9.61 K/UL (ref 3.9–12.7)
WBC # FLD: 805 /CU MM
WBC #/AREA URNS AUTO: >100 /HPF (ref 0–5)
WBC CLUMPS UR QL AUTO: ABNORMAL

## 2021-01-01 PROCEDURE — S0166 INJ OLANZAPINE 2.5MG: HCPCS | Performed by: HOSPITALIST

## 2021-01-01 PROCEDURE — G0378 HOSPITAL OBSERVATION PER HR: HCPCS

## 2021-01-01 PROCEDURE — 83735 ASSAY OF MAGNESIUM: CPT | Performed by: NURSE PRACTITIONER

## 2021-01-01 PROCEDURE — 85025 COMPLETE CBC W/AUTO DIFF WBC: CPT | Performed by: PHYSICIAN ASSISTANT

## 2021-01-01 PROCEDURE — 92610 EVALUATE SWALLOWING FUNCTION: CPT

## 2021-01-01 PROCEDURE — 63600175 PHARM REV CODE 636 W HCPCS: Performed by: NURSE ANESTHETIST, CERTIFIED REGISTERED

## 2021-01-01 PROCEDURE — 83735 ASSAY OF MAGNESIUM: CPT | Performed by: PHYSICIAN ASSISTANT

## 2021-01-01 PROCEDURE — 25000003 PHARM REV CODE 250: Performed by: NURSE PRACTITIONER

## 2021-01-01 PROCEDURE — 93010 ELECTROCARDIOGRAM REPORT: CPT | Mod: ,,, | Performed by: INTERNAL MEDICINE

## 2021-01-01 PROCEDURE — 96372 THER/PROPH/DIAG INJ SC/IM: CPT | Performed by: HOSPITALIST

## 2021-01-01 PROCEDURE — 90935 PR HEMODIALYSIS, ONE EVALUATION: ICD-10-PCS | Mod: ,,, | Performed by: NURSE PRACTITIONER

## 2021-01-01 PROCEDURE — 99224 PR SUBSEQUENT OBSERVATION CARE,LEVEL I: CPT | Mod: ,,, | Performed by: PHYSICIAN ASSISTANT

## 2021-01-01 PROCEDURE — 36415 COLL VENOUS BLD VENIPUNCTURE: CPT | Performed by: INTERNAL MEDICINE

## 2021-01-01 PROCEDURE — U0003 INFECTIOUS AGENT DETECTION BY NUCLEIC ACID (DNA OR RNA); SEVERE ACUTE RESPIRATORY SYNDROME CORONAVIRUS 2 (SARS-COV-2) (CORONAVIRUS DISEASE [COVID-19]), AMPLIFIED PROBE TECHNIQUE, MAKING USE OF HIGH THROUGHPUT TECHNOLOGIES AS DESCRIBED BY CMS-2020-01-R: HCPCS | Mod: ST72 | Performed by: NURSE PRACTITIONER

## 2021-01-01 PROCEDURE — 97535 SELF CARE MNGMENT TRAINING: CPT

## 2021-01-01 PROCEDURE — 97161 PT EVAL LOW COMPLEX 20 MIN: CPT

## 2021-01-01 PROCEDURE — 25000003 PHARM REV CODE 250: Performed by: INTERNAL MEDICINE

## 2021-01-01 PROCEDURE — U0002 COVID-19 LAB TEST NON-CDC: HCPCS | Performed by: PHYSICIAN ASSISTANT

## 2021-01-01 PROCEDURE — 80053 COMPREHEN METABOLIC PANEL: CPT | Performed by: PHYSICIAN ASSISTANT

## 2021-01-01 PROCEDURE — 25500020 PHARM REV CODE 255: Performed by: SURGERY

## 2021-01-01 PROCEDURE — 99284 EMERGENCY DEPT VISIT MOD MDM: CPT | Mod: ,,, | Performed by: INTERNAL MEDICINE

## 2021-01-01 PROCEDURE — 85025 COMPLETE CBC W/AUTO DIFF WBC: CPT | Mod: 91 | Performed by: PHYSICIAN ASSISTANT

## 2021-01-01 PROCEDURE — 80053 COMPREHEN METABOLIC PANEL: CPT | Performed by: NURSE PRACTITIONER

## 2021-01-01 PROCEDURE — 25000003 PHARM REV CODE 250: Performed by: PHYSICIAN ASSISTANT

## 2021-01-01 PROCEDURE — 99226 PR SUBSEQUENT OBSERVATION CARE,LEVEL III: ICD-10-PCS | Mod: ,,, | Performed by: PHYSICIAN ASSISTANT

## 2021-01-01 PROCEDURE — C1769 GUIDE WIRE: HCPCS | Performed by: SURGERY

## 2021-01-01 PROCEDURE — 99285 EMERGENCY DEPT VISIT HI MDM: CPT | Mod: 25

## 2021-01-01 PROCEDURE — 99226 PR SUBSEQUENT OBSERVATION CARE,LEVEL III: ICD-10-PCS | Mod: ,,, | Performed by: HOSPITALIST

## 2021-01-01 PROCEDURE — G0257 UNSCHED DIALYSIS ESRD PT HOS: HCPCS

## 2021-01-01 PROCEDURE — 80048 BASIC METABOLIC PNL TOTAL CA: CPT | Performed by: INTERNAL MEDICINE

## 2021-01-01 PROCEDURE — 80100014 HC HEMODIALYSIS 1:1

## 2021-01-01 PROCEDURE — 71000033 HC RECOVERY, INTIAL HOUR: Performed by: SURGERY

## 2021-01-01 PROCEDURE — 99217 PR OBSERVATION CARE DISCHARGE: ICD-10-PCS | Mod: ,,, | Performed by: PHYSICIAN ASSISTANT

## 2021-01-01 PROCEDURE — 82803 BLOOD GASES ANY COMBINATION: CPT

## 2021-01-01 PROCEDURE — 87040 BLOOD CULTURE FOR BACTERIA: CPT | Performed by: PHYSICIAN ASSISTANT

## 2021-01-01 PROCEDURE — 82962 GLUCOSE BLOOD TEST: CPT

## 2021-01-01 PROCEDURE — 96372 THER/PROPH/DIAG INJ SC/IM: CPT | Mod: 59 | Performed by: HOSPITALIST

## 2021-01-01 PROCEDURE — 99239 PR HOSPITAL DISCHARGE DAY,>30 MIN: ICD-10-PCS | Mod: ,,, | Performed by: PHYSICIAN ASSISTANT

## 2021-01-01 PROCEDURE — 82140 ASSAY OF AMMONIA: CPT | Performed by: EMERGENCY MEDICINE

## 2021-01-01 PROCEDURE — 87102 FUNGUS ISOLATION CULTURE: CPT

## 2021-01-01 PROCEDURE — P9021 RED BLOOD CELLS UNIT: HCPCS | Performed by: PHYSICIAN ASSISTANT

## 2021-01-01 PROCEDURE — 82962 GLUCOSE BLOOD TEST: CPT | Performed by: SURGERY

## 2021-01-01 PROCEDURE — 83880 ASSAY OF NATRIURETIC PEPTIDE: CPT | Performed by: PHYSICIAN ASSISTANT

## 2021-01-01 PROCEDURE — 80048 BASIC METABOLIC PNL TOTAL CA: CPT | Performed by: EMERGENCY MEDICINE

## 2021-01-01 PROCEDURE — 36415 COLL VENOUS BLD VENIPUNCTURE: CPT | Performed by: PHYSICIAN ASSISTANT

## 2021-01-01 PROCEDURE — 80069 RENAL FUNCTION PANEL: CPT | Performed by: PHYSICIAN ASSISTANT

## 2021-01-01 PROCEDURE — 11000001 HC ACUTE MED/SURG PRIVATE ROOM

## 2021-01-01 PROCEDURE — D9220A PRA ANESTHESIA: Mod: CRNA,,, | Performed by: NURSE ANESTHETIST, CERTIFIED REGISTERED

## 2021-01-01 PROCEDURE — 25000003 PHARM REV CODE 250: Performed by: HOSPITALIST

## 2021-01-01 PROCEDURE — 80307 DRUG TEST PRSMV CHEM ANLYZR: CPT | Performed by: PHYSICIAN ASSISTANT

## 2021-01-01 PROCEDURE — 99226 PR SUBSEQUENT OBSERVATION CARE,LEVEL III: CPT | Mod: ,,, | Performed by: PHYSICIAN ASSISTANT

## 2021-01-01 PROCEDURE — C9399 UNCLASSIFIED DRUGS OR BIOLOG: HCPCS | Performed by: PHYSICIAN ASSISTANT

## 2021-01-01 PROCEDURE — 99285 PR EMERGENCY DEPT VISIT,LEVEL V: ICD-10-PCS | Mod: CS,,, | Performed by: EMERGENCY MEDICINE

## 2021-01-01 PROCEDURE — 85025 COMPLETE CBC W/AUTO DIFF WBC: CPT | Performed by: INTERNAL MEDICINE

## 2021-01-01 PROCEDURE — 87086 URINE CULTURE/COLONY COUNT: CPT | Performed by: PHYSICIAN ASSISTANT

## 2021-01-01 PROCEDURE — 89060 EXAM SYNOVIAL FLUID CRYSTALS: CPT

## 2021-01-01 PROCEDURE — 36415 COLL VENOUS BLD VENIPUNCTURE: CPT | Performed by: NURSE PRACTITIONER

## 2021-01-01 PROCEDURE — 84100 ASSAY OF PHOSPHORUS: CPT | Performed by: NURSE PRACTITIONER

## 2021-01-01 PROCEDURE — 89051 BODY FLUID CELL COUNT: CPT

## 2021-01-01 PROCEDURE — 99284 PR EMERGENCY DEPT VISIT,LEVEL IV: ICD-10-PCS | Mod: GC,CS,, | Performed by: EMERGENCY MEDICINE

## 2021-01-01 PROCEDURE — U0003 INFECTIOUS AGENT DETECTION BY NUCLEIC ACID (DNA OR RNA); SEVERE ACUTE RESPIRATORY SYNDROME CORONAVIRUS 2 (SARS-COV-2) (CORONAVIRUS DISEASE [COVID-19]), AMPLIFIED PROBE TECHNIQUE, MAKING USE OF HIGH THROUGHPUT TECHNOLOGIES AS DESCRIBED BY CMS-2020-01-R: HCPCS | Performed by: NURSE PRACTITIONER

## 2021-01-01 PROCEDURE — 99217 PR OBSERVATION CARE DISCHARGE: CPT | Mod: ,,, | Performed by: INTERNAL MEDICINE

## 2021-01-01 PROCEDURE — 84100 ASSAY OF PHOSPHORUS: CPT | Performed by: STUDENT IN AN ORGANIZED HEALTH CARE EDUCATION/TRAINING PROGRAM

## 2021-01-01 PROCEDURE — 85025 COMPLETE CBC W/AUTO DIFF WBC: CPT | Performed by: NURSE PRACTITIONER

## 2021-01-01 PROCEDURE — 99239 HOSP IP/OBS DSCHRG MGMT >30: CPT | Mod: ,,, | Performed by: PHYSICIAN ASSISTANT

## 2021-01-01 PROCEDURE — 83605 ASSAY OF LACTIC ACID: CPT | Performed by: PHYSICIAN ASSISTANT

## 2021-01-01 PROCEDURE — 99233 SBSQ HOSP IP/OBS HIGH 50: CPT | Mod: ,,, | Performed by: PHYSICIAN ASSISTANT

## 2021-01-01 PROCEDURE — 63600175 PHARM REV CODE 636 W HCPCS: Mod: TB | Performed by: NURSE PRACTITIONER

## 2021-01-01 PROCEDURE — 97167 OT EVAL HIGH COMPLEX 60 MIN: CPT

## 2021-01-01 PROCEDURE — 93990 DOPPLER FLOW TESTING: CPT | Performed by: SURGERY

## 2021-01-01 PROCEDURE — 86920 COMPATIBILITY TEST SPIN: CPT | Performed by: PHYSICIAN ASSISTANT

## 2021-01-01 PROCEDURE — 99900035 HC TECH TIME PER 15 MIN (STAT)

## 2021-01-01 PROCEDURE — 99225 PR SUBSEQUENT OBSERVATION CARE,LEVEL II: CPT | Mod: 95,,, | Performed by: HOSPITALIST

## 2021-01-01 PROCEDURE — 99226 PR SUBSEQUENT OBSERVATION CARE,LEVEL III: CPT | Mod: ,,, | Performed by: HOSPITALIST

## 2021-01-01 PROCEDURE — 99220 PR INITIAL OBSERVATION CARE,LEVL III: CPT | Mod: ,,, | Performed by: INTERNAL MEDICINE

## 2021-01-01 PROCEDURE — 99217 PR OBSERVATION CARE DISCHARGE: CPT | Mod: ,,, | Performed by: HOSPITALIST

## 2021-01-01 PROCEDURE — 99233 PR SUBSEQUENT HOSPITAL CARE,LEVL III: ICD-10-PCS | Mod: ,,, | Performed by: PHYSICIAN ASSISTANT

## 2021-01-01 PROCEDURE — 87040 BLOOD CULTURE FOR BACTERIA: CPT | Mod: 59 | Performed by: HOSPITALIST

## 2021-01-01 PROCEDURE — 99217 PR OBSERVATION CARE DISCHARGE: CPT | Mod: ,,, | Performed by: PHYSICIAN ASSISTANT

## 2021-01-01 PROCEDURE — 83735 ASSAY OF MAGNESIUM: CPT | Performed by: STUDENT IN AN ORGANIZED HEALTH CARE EDUCATION/TRAINING PROGRAM

## 2021-01-01 PROCEDURE — U0003 INFECTIOUS AGENT DETECTION BY NUCLEIC ACID (DNA OR RNA); SEVERE ACUTE RESPIRATORY SYNDROME CORONAVIRUS 2 (SARS-COV-2) (CORONAVIRUS DISEASE [COVID-19]), AMPLIFIED PROBE TECHNIQUE, MAKING USE OF HIGH THROUGHPUT TECHNOLOGIES AS DESCRIBED BY CMS-2020-01-R: HCPCS | Performed by: PHYSICIAN ASSISTANT

## 2021-01-01 PROCEDURE — 84100 ASSAY OF PHOSPHORUS: CPT | Performed by: PHYSICIAN ASSISTANT

## 2021-01-01 PROCEDURE — 99285 EMERGENCY DEPT VISIT HI MDM: CPT | Mod: CS,,, | Performed by: EMERGENCY MEDICINE

## 2021-01-01 PROCEDURE — 99220 PR INITIAL OBSERVATION CARE,LEVL III: CPT | Mod: ,,, | Performed by: PHYSICIAN ASSISTANT

## 2021-01-01 PROCEDURE — A4216 STERILE WATER/SALINE, 10 ML: HCPCS | Performed by: PHYSICIAN ASSISTANT

## 2021-01-01 PROCEDURE — 97110 THERAPEUTIC EXERCISES: CPT

## 2021-01-01 PROCEDURE — 25000003 PHARM REV CODE 250: Performed by: STUDENT IN AN ORGANIZED HEALTH CARE EDUCATION/TRAINING PROGRAM

## 2021-01-01 PROCEDURE — 90935 HEMODIALYSIS ONE EVALUATION: CPT | Mod: ,,, | Performed by: NURSE PRACTITIONER

## 2021-01-01 PROCEDURE — 87075 CULTR BACTERIA EXCEPT BLOOD: CPT | Mod: 59

## 2021-01-01 PROCEDURE — 93010 EKG 12-LEAD: ICD-10-PCS | Mod: ,,, | Performed by: INTERNAL MEDICINE

## 2021-01-01 PROCEDURE — 63600175 PHARM REV CODE 636 W HCPCS: Performed by: INTERNAL MEDICINE

## 2021-01-01 PROCEDURE — U0003 INFECTIOUS AGENT DETECTION BY NUCLEIC ACID (DNA OR RNA); SEVERE ACUTE RESPIRATORY SYNDROME CORONAVIRUS 2 (SARS-COV-2) (CORONAVIRUS DISEASE [COVID-19]), AMPLIFIED PROBE TECHNIQUE, MAKING USE OF HIGH THROUGHPUT TECHNOLOGIES AS DESCRIBED BY CMS-2020-01-R: HCPCS | Performed by: HOSPITALIST

## 2021-01-01 PROCEDURE — 25000003 PHARM REV CODE 250: Performed by: SURGERY

## 2021-01-01 PROCEDURE — D9220A PRA ANESTHESIA: ICD-10-PCS | Mod: CRNA,,, | Performed by: NURSE ANESTHETIST, CERTIFIED REGISTERED

## 2021-01-01 PROCEDURE — 90935 HEMODIALYSIS ONE EVALUATION: CPT

## 2021-01-01 PROCEDURE — 36000706: Performed by: SURGERY

## 2021-01-01 PROCEDURE — 80048 BASIC METABOLIC PNL TOTAL CA: CPT | Performed by: PHYSICIAN ASSISTANT

## 2021-01-01 PROCEDURE — 99285 PR EMERGENCY DEPT VISIT,LEVEL V: ICD-10-PCS | Mod: ,,, | Performed by: PHYSICIAN ASSISTANT

## 2021-01-01 PROCEDURE — 76937 US GUIDE VASCULAR ACCESS: CPT | Performed by: ANESTHESIOLOGY

## 2021-01-01 PROCEDURE — 99225 PR SUBSEQUENT OBSERVATION CARE,LEVEL II: ICD-10-PCS | Mod: ,,, | Performed by: INTERNAL MEDICINE

## 2021-01-01 PROCEDURE — 97166 OT EVAL MOD COMPLEX 45 MIN: CPT

## 2021-01-01 PROCEDURE — 30200315 PPD INTRADERMAL TEST REV CODE 302: Performed by: HOSPITALIST

## 2021-01-01 PROCEDURE — 99220 PR INITIAL OBSERVATION CARE,LEVL III: ICD-10-PCS | Mod: ,,, | Performed by: PHYSICIAN ASSISTANT

## 2021-01-01 PROCEDURE — 85027 COMPLETE CBC AUTOMATED: CPT | Performed by: HOSPITALIST

## 2021-01-01 PROCEDURE — C1729 CATH, DRAINAGE: HCPCS | Performed by: SURGERY

## 2021-01-01 PROCEDURE — 99225 PR SUBSEQUENT OBSERVATION CARE,LEVEL II: ICD-10-PCS | Mod: 95,,, | Performed by: HOSPITALIST

## 2021-01-01 PROCEDURE — 94761 N-INVAS EAR/PLS OXIMETRY MLT: CPT

## 2021-01-01 PROCEDURE — 36905 THRMBC/NFS DIALYSIS CIRCUIT: CPT | Mod: ,,, | Performed by: SURGERY

## 2021-01-01 PROCEDURE — 63600175 PHARM REV CODE 636 W HCPCS: Performed by: PHYSICIAN ASSISTANT

## 2021-01-01 PROCEDURE — 96374 THER/PROPH/DIAG INJ IV PUSH: CPT

## 2021-01-01 PROCEDURE — 87088 URINE BACTERIA CULTURE: CPT | Performed by: PHYSICIAN ASSISTANT

## 2021-01-01 PROCEDURE — 96372 THER/PROPH/DIAG INJ SC/IM: CPT | Performed by: EMERGENCY MEDICINE

## 2021-01-01 PROCEDURE — 99225 PR SUBSEQUENT OBSERVATION CARE,LEVEL II: ICD-10-PCS | Mod: ,,, | Performed by: PHYSICIAN ASSISTANT

## 2021-01-01 PROCEDURE — 84484 ASSAY OF TROPONIN QUANT: CPT | Performed by: PHYSICIAN ASSISTANT

## 2021-01-01 PROCEDURE — 80047 BASIC METABLC PNL IONIZED CA: CPT

## 2021-01-01 PROCEDURE — 99226 PR SUBSEQUENT OBSERVATION CARE,LEVEL III: ICD-10-PCS | Mod: ,,, | Performed by: INTERNAL MEDICINE

## 2021-01-01 PROCEDURE — 85025 COMPLETE CBC W/AUTO DIFF WBC: CPT | Performed by: STUDENT IN AN ORGANIZED HEALTH CARE EDUCATION/TRAINING PROGRAM

## 2021-01-01 PROCEDURE — 90935 PR HEMODIALYSIS, ONE EVALUATION: ICD-10-PCS | Mod: ,,, | Performed by: INTERNAL MEDICINE

## 2021-01-01 PROCEDURE — 86580 TB INTRADERMAL TEST: CPT | Performed by: HOSPITALIST

## 2021-01-01 PROCEDURE — 93005 ELECTROCARDIOGRAM TRACING: CPT

## 2021-01-01 PROCEDURE — 99215 PR OFFICE/OUTPT VISIT, EST, LEVL V, 40-54 MIN: ICD-10-PCS | Mod: ,,, | Performed by: NURSE PRACTITIONER

## 2021-01-01 PROCEDURE — 25000242 PHARM REV CODE 250 ALT 637 W/ HCPCS: Performed by: PHYSICIAN ASSISTANT

## 2021-01-01 PROCEDURE — 99284 PR EMERGENCY DEPT VISIT,LEVEL IV: ICD-10-PCS | Mod: ,,, | Performed by: INTERNAL MEDICINE

## 2021-01-01 PROCEDURE — 99217 PR OBSERVATION CARE DISCHARGE: ICD-10-PCS | Mod: ,,, | Performed by: INTERNAL MEDICINE

## 2021-01-01 PROCEDURE — U0002 COVID-19 LAB TEST NON-CDC: HCPCS | Performed by: EMERGENCY MEDICINE

## 2021-01-01 PROCEDURE — 90935 HEMODIALYSIS ONE EVALUATION: CPT | Mod: ,,, | Performed by: INTERNAL MEDICINE

## 2021-01-01 PROCEDURE — U0005 INFEC AGEN DETEC AMPLI PROBE: HCPCS | Performed by: HOSPITALIST

## 2021-01-01 PROCEDURE — 86803 HEPATITIS C AB TEST: CPT | Performed by: EMERGENCY MEDICINE

## 2021-01-01 PROCEDURE — 99224 PR SUBSEQUENT OBSERVATION CARE,LEVEL I: ICD-10-PCS | Mod: ,,, | Performed by: PHYSICIAN ASSISTANT

## 2021-01-01 PROCEDURE — 94640 AIRWAY INHALATION TREATMENT: CPT

## 2021-01-01 PROCEDURE — 99220 PR INITIAL OBSERVATION CARE,LEVL III: ICD-10-PCS | Mod: ,,, | Performed by: INTERNAL MEDICINE

## 2021-01-01 PROCEDURE — 80053 COMPREHEN METABOLIC PANEL: CPT | Performed by: STUDENT IN AN ORGANIZED HEALTH CARE EDUCATION/TRAINING PROGRAM

## 2021-01-01 PROCEDURE — 81001 URINALYSIS AUTO W/SCOPE: CPT | Performed by: PHYSICIAN ASSISTANT

## 2021-01-01 PROCEDURE — 97162 PT EVAL MOD COMPLEX 30 MIN: CPT

## 2021-01-01 PROCEDURE — 99217 PR OBSERVATION CARE DISCHARGE: ICD-10-PCS | Mod: ,,, | Performed by: HOSPITALIST

## 2021-01-01 PROCEDURE — 85025 COMPLETE CBC W/AUTO DIFF WBC: CPT | Performed by: EMERGENCY MEDICINE

## 2021-01-01 PROCEDURE — 87070 CULTURE OTHR SPECIMN AEROBIC: CPT

## 2021-01-01 PROCEDURE — D9220A PRA ANESTHESIA: Mod: ANES,,, | Performed by: STUDENT IN AN ORGANIZED HEALTH CARE EDUCATION/TRAINING PROGRAM

## 2021-01-01 PROCEDURE — 99285 EMERGENCY DEPT VISIT HI MDM: CPT

## 2021-01-01 PROCEDURE — 99215 OFFICE O/P EST HI 40 MIN: CPT | Mod: ,,, | Performed by: NURSE PRACTITIONER

## 2021-01-01 PROCEDURE — 96376 TX/PRO/DX INJ SAME DRUG ADON: CPT

## 2021-01-01 PROCEDURE — 63600175 PHARM REV CODE 636 W HCPCS: Performed by: NURSE PRACTITIONER

## 2021-01-01 PROCEDURE — D9220A PRA ANESTHESIA: ICD-10-PCS | Mod: ANES,,, | Performed by: STUDENT IN AN ORGANIZED HEALTH CARE EDUCATION/TRAINING PROGRAM

## 2021-01-01 PROCEDURE — 99223 1ST HOSP IP/OBS HIGH 75: CPT | Mod: GC,,, | Performed by: SURGERY

## 2021-01-01 PROCEDURE — 99226 PR SUBSEQUENT OBSERVATION CARE,LEVEL III: CPT | Mod: 95,,, | Performed by: HOSPITALIST

## 2021-01-01 PROCEDURE — 1111F DSCHRG MED/CURRENT MED MERGE: CPT | Mod: CPTII,,, | Performed by: PHYSICIAN ASSISTANT

## 2021-01-01 PROCEDURE — 99226 PR SUBSEQUENT OBSERVATION CARE,LEVEL III: CPT | Mod: ,,, | Performed by: INTERNAL MEDICINE

## 2021-01-01 PROCEDURE — 84100 ASSAY OF PHOSPHORUS: CPT | Performed by: INTERNAL MEDICINE

## 2021-01-01 PROCEDURE — 37000008 HC ANESTHESIA 1ST 15 MINUTES: Performed by: SURGERY

## 2021-01-01 PROCEDURE — 97116 GAIT TRAINING THERAPY: CPT

## 2021-01-01 PROCEDURE — 27201423 OPTIME MED/SURG SUP & DEVICES STERILE SUPPLY: Performed by: SURGERY

## 2021-01-01 PROCEDURE — 96375 TX/PRO/DX INJ NEW DRUG ADDON: CPT

## 2021-01-01 PROCEDURE — 80069 RENAL FUNCTION PANEL: CPT | Performed by: INTERNAL MEDICINE

## 2021-01-01 PROCEDURE — 99220 PR INITIAL OBSERVATION CARE,LEVL III: ICD-10-PCS | Mod: ,,, | Performed by: NURSE PRACTITIONER

## 2021-01-01 PROCEDURE — G0180 PR HOME HEALTH MD CERTIFICATION: ICD-10-PCS | Mod: ,,, | Performed by: HOSPITALIST

## 2021-01-01 PROCEDURE — 86140 C-REACTIVE PROTEIN: CPT | Performed by: HOSPITALIST

## 2021-01-01 PROCEDURE — 85652 RBC SED RATE AUTOMATED: CPT | Performed by: HOSPITALIST

## 2021-01-01 PROCEDURE — 36907 PR TRANSLML BALLOON ANGIO, CTR DIALYSIS SEGMENT THRU DIALYSIS CIRCUIT: ICD-10-PCS | Mod: ,,, | Performed by: SURGERY

## 2021-01-01 PROCEDURE — 87077 CULTURE AEROBIC IDENTIFY: CPT | Performed by: PHYSICIAN ASSISTANT

## 2021-01-01 PROCEDURE — C1757 CATH, THROMBECTOMY/EMBOLECT: HCPCS | Performed by: SURGERY

## 2021-01-01 PROCEDURE — 71000015 HC POSTOP RECOV 1ST HR: Performed by: SURGERY

## 2021-01-01 PROCEDURE — 86900 BLOOD TYPING SEROLOGIC ABO: CPT | Performed by: PHYSICIAN ASSISTANT

## 2021-01-01 PROCEDURE — 99220 PR INITIAL OBSERVATION CARE,LEVL III: CPT | Mod: ,,, | Performed by: NURSE PRACTITIONER

## 2021-01-01 PROCEDURE — 87205 SMEAR GRAM STAIN: CPT

## 2021-01-01 PROCEDURE — C1887 CATHETER, GUIDING: HCPCS | Performed by: SURGERY

## 2021-01-01 PROCEDURE — U0005 INFEC AGEN DETEC AMPLI PROBE: HCPCS | Performed by: PHYSICIAN ASSISTANT

## 2021-01-01 PROCEDURE — 99223 PR INITIAL HOSPITAL CARE,LEVL III: ICD-10-PCS | Mod: GC,,, | Performed by: SURGERY

## 2021-01-01 PROCEDURE — 96372 THER/PROPH/DIAG INJ SC/IM: CPT | Mod: 59 | Performed by: EMERGENCY MEDICINE

## 2021-01-01 PROCEDURE — 99284 EMERGENCY DEPT VISIT MOD MDM: CPT | Mod: GC,CS,, | Performed by: EMERGENCY MEDICINE

## 2021-01-01 PROCEDURE — 97530 THERAPEUTIC ACTIVITIES: CPT

## 2021-01-01 PROCEDURE — 36905 PR MECH THROMBECTOMY/INFUS, DIALYSIS CIRCUIT W/ TRANSLML BALLOON ANGIO: ICD-10-PCS | Mod: ,,, | Performed by: SURGERY

## 2021-01-01 PROCEDURE — 87040 BLOOD CULTURE FOR BACTERIA: CPT | Mod: 59 | Performed by: PHYSICIAN ASSISTANT

## 2021-01-01 PROCEDURE — 36415 COLL VENOUS BLD VENIPUNCTURE: CPT | Performed by: HOSPITALIST

## 2021-01-01 PROCEDURE — 1111F PR DISCHARGE MEDS RECONCILED W/ CURRENT OUTPATIENT MED LIST: ICD-10-PCS | Mod: CPTII,,, | Performed by: PHYSICIAN ASSISTANT

## 2021-01-01 PROCEDURE — 63600175 PHARM REV CODE 636 W HCPCS: Performed by: STUDENT IN AN ORGANIZED HEALTH CARE EDUCATION/TRAINING PROGRAM

## 2021-01-01 PROCEDURE — 37000009 HC ANESTHESIA EA ADD 15 MINS: Performed by: SURGERY

## 2021-01-01 PROCEDURE — 63600175 PHARM REV CODE 636 W HCPCS: Mod: JG | Performed by: SURGERY

## 2021-01-01 PROCEDURE — 99225 PR SUBSEQUENT OBSERVATION CARE,LEVEL II: CPT | Mod: ,,, | Performed by: INTERNAL MEDICINE

## 2021-01-01 PROCEDURE — 83036 HEMOGLOBIN GLYCOSYLATED A1C: CPT | Performed by: INTERNAL MEDICINE

## 2021-01-01 PROCEDURE — 97535 SELF CARE MNGMENT TRAINING: CPT | Mod: 59

## 2021-01-01 PROCEDURE — 36000707: Performed by: SURGERY

## 2021-01-01 PROCEDURE — 80069 RENAL FUNCTION PANEL: CPT | Performed by: NURSE PRACTITIONER

## 2021-01-01 PROCEDURE — 99226 PR SUBSEQUENT OBSERVATION CARE,LEVEL III: ICD-10-PCS | Mod: 95,,, | Performed by: HOSPITALIST

## 2021-01-01 PROCEDURE — 87116 MYCOBACTERIA CULTURE: CPT

## 2021-01-01 PROCEDURE — 87206 SMEAR FLUORESCENT/ACID STAI: CPT

## 2021-01-01 PROCEDURE — 87186 SC STD MICRODIL/AGAR DIL: CPT | Performed by: PHYSICIAN ASSISTANT

## 2021-01-01 PROCEDURE — 99285 PR EMERGENCY DEPT VISIT,LEVEL V: ICD-10-PCS | Mod: GC,,, | Performed by: EMERGENCY MEDICINE

## 2021-01-01 PROCEDURE — 76937 US GUIDE VASCULAR ACCESS: CPT | Performed by: STUDENT IN AN ORGANIZED HEALTH CARE EDUCATION/TRAINING PROGRAM

## 2021-01-01 PROCEDURE — 99284 EMERGENCY DEPT VISIT MOD MDM: CPT | Mod: 25

## 2021-01-01 PROCEDURE — 99225 PR SUBSEQUENT OBSERVATION CARE,LEVEL II: CPT | Mod: ,,, | Performed by: PHYSICIAN ASSISTANT

## 2021-01-01 PROCEDURE — 36907 BALO ANGIOP CTR DIALYSIS SEG: CPT | Mod: ,,, | Performed by: SURGERY

## 2021-01-01 PROCEDURE — C1894 INTRO/SHEATH, NON-LASER: HCPCS | Performed by: SURGERY

## 2021-01-01 PROCEDURE — C1725 CATH, TRANSLUMIN NON-LASER: HCPCS | Performed by: SURGERY

## 2021-01-01 PROCEDURE — 97165 OT EVAL LOW COMPLEX 30 MIN: CPT

## 2021-01-01 PROCEDURE — 99285 EMERGENCY DEPT VISIT HI MDM: CPT | Mod: GC,,, | Performed by: EMERGENCY MEDICINE

## 2021-01-01 PROCEDURE — 99285 EMERGENCY DEPT VISIT HI MDM: CPT | Mod: ,,, | Performed by: PHYSICIAN ASSISTANT

## 2021-01-01 PROCEDURE — G0180 MD CERTIFICATION HHA PATIENT: HCPCS | Mod: ,,, | Performed by: HOSPITALIST

## 2021-01-01 PROCEDURE — 80047 BASIC METABLC PNL IONIZED CA: CPT | Mod: 59

## 2021-01-01 RX ORDER — ONDANSETRON 8 MG/1
8 TABLET, ORALLY DISINTEGRATING ORAL EVERY 8 HOURS PRN
Status: DISCONTINUED | OUTPATIENT
Start: 2021-01-01 | End: 2021-01-01 | Stop reason: HOSPADM

## 2021-01-01 RX ORDER — CALCIUM ACETATE 667 MG/1
667 CAPSULE ORAL
Status: DISCONTINUED | OUTPATIENT
Start: 2021-01-01 | End: 2021-01-01 | Stop reason: HOSPADM

## 2021-01-01 RX ORDER — MUPIROCIN 20 MG/G
OINTMENT TOPICAL 2 TIMES DAILY
Status: DISCONTINUED | OUTPATIENT
Start: 2021-01-01 | End: 2021-01-01 | Stop reason: HOSPADM

## 2021-01-01 RX ORDER — BISACODYL 10 MG
10 SUPPOSITORY, RECTAL RECTAL DAILY PRN
Status: DISCONTINUED | OUTPATIENT
Start: 2021-01-01 | End: 2021-01-01 | Stop reason: HOSPADM

## 2021-01-01 RX ORDER — GLUCAGON 1 MG
1 KIT INJECTION
Status: DISCONTINUED | OUTPATIENT
Start: 2021-01-01 | End: 2021-01-01 | Stop reason: HOSPADM

## 2021-01-01 RX ORDER — HEPARIN SODIUM 1000 [USP'U]/ML
1000 INJECTION, SOLUTION INTRAVENOUS; SUBCUTANEOUS
Status: DISCONTINUED | OUTPATIENT
Start: 2021-01-01 | End: 2021-01-01 | Stop reason: HOSPADM

## 2021-01-01 RX ORDER — POLYETHYLENE GLYCOL 3350 17 G/17G
17 POWDER, FOR SOLUTION ORAL DAILY PRN
Status: DISCONTINUED | OUTPATIENT
Start: 2021-01-01 | End: 2021-01-01 | Stop reason: HOSPADM

## 2021-01-01 RX ORDER — FENTANYL CITRATE 50 UG/ML
INJECTION, SOLUTION INTRAMUSCULAR; INTRAVENOUS
Status: DISCONTINUED | OUTPATIENT
Start: 2021-01-01 | End: 2021-01-01

## 2021-01-01 RX ORDER — CEFTRIAXONE 1 G/1
1 INJECTION, POWDER, FOR SOLUTION INTRAMUSCULAR; INTRAVENOUS
Status: DISCONTINUED | OUTPATIENT
Start: 2021-01-01 | End: 2021-01-01

## 2021-01-01 RX ORDER — FAMOTIDINE 20 MG/1
20 TABLET, FILM COATED ORAL DAILY
Status: DISCONTINUED | OUTPATIENT
Start: 2021-01-01 | End: 2021-01-01 | Stop reason: HOSPADM

## 2021-01-01 RX ORDER — METOPROLOL TARTRATE 25 MG/1
25 TABLET, FILM COATED ORAL DAILY
Status: DISCONTINUED | OUTPATIENT
Start: 2021-01-01 | End: 2021-01-01 | Stop reason: HOSPADM

## 2021-01-01 RX ORDER — ACETAMINOPHEN 325 MG/1
650 TABLET ORAL EVERY 4 HOURS PRN
Status: DISCONTINUED | OUTPATIENT
Start: 2021-01-01 | End: 2021-01-01 | Stop reason: HOSPADM

## 2021-01-01 RX ORDER — ACETAMINOPHEN 325 MG/1
650 TABLET ORAL EVERY 6 HOURS PRN
Refills: 0
Start: 2021-01-01

## 2021-01-01 RX ORDER — INSULIN ASPART 100 [IU]/ML
0-5 INJECTION, SOLUTION INTRAVENOUS; SUBCUTANEOUS
Status: DISCONTINUED | OUTPATIENT
Start: 2021-01-01 | End: 2021-01-01

## 2021-01-01 RX ORDER — QUETIAPINE FUMARATE 25 MG/1
25 TABLET, FILM COATED ORAL NIGHTLY PRN
Status: DISCONTINUED | OUTPATIENT
Start: 2021-01-01 | End: 2021-01-01 | Stop reason: HOSPADM

## 2021-01-01 RX ORDER — IBUPROFEN 200 MG
24 TABLET ORAL
Status: DISCONTINUED | OUTPATIENT
Start: 2021-01-01 | End: 2021-01-01 | Stop reason: HOSPADM

## 2021-01-01 RX ORDER — CLOPIDOGREL BISULFATE 75 MG/1
75 TABLET ORAL DAILY
Status: DISCONTINUED | OUTPATIENT
Start: 2021-01-01 | End: 2021-01-01 | Stop reason: HOSPADM

## 2021-01-01 RX ORDER — TRAMADOL HYDROCHLORIDE 50 MG/1
50 TABLET ORAL EVERY 8 HOURS PRN
Status: DISCONTINUED | OUTPATIENT
Start: 2021-01-01 | End: 2021-01-01 | Stop reason: HOSPADM

## 2021-01-01 RX ORDER — SODIUM CHLORIDE 9 MG/ML
INJECTION, SOLUTION INTRAVENOUS ONCE
Status: DISCONTINUED | OUTPATIENT
Start: 2021-01-01 | End: 2021-01-01 | Stop reason: HOSPADM

## 2021-01-01 RX ORDER — SODIUM CHLORIDE 9 MG/ML
INJECTION, SOLUTION INTRAVENOUS ONCE
Status: COMPLETED | OUTPATIENT
Start: 2021-01-01 | End: 2021-01-01

## 2021-01-01 RX ORDER — CYANOCOBALAMIN (VITAMIN B-12) 250 MCG
250 TABLET ORAL DAILY
Status: DISCONTINUED | OUTPATIENT
Start: 2021-01-01 | End: 2021-01-01 | Stop reason: HOSPADM

## 2021-01-01 RX ORDER — ONDANSETRON 2 MG/ML
4 INJECTION INTRAMUSCULAR; INTRAVENOUS EVERY 8 HOURS PRN
Status: DISCONTINUED | OUTPATIENT
Start: 2021-01-01 | End: 2021-01-01 | Stop reason: HOSPADM

## 2021-01-01 RX ORDER — PARICALCITOL 5 UG/ML
5 INJECTION, SOLUTION INTRAVENOUS
Status: DISCONTINUED | OUTPATIENT
Start: 2021-01-01 | End: 2021-01-01 | Stop reason: HOSPADM

## 2021-01-01 RX ORDER — PSEUDOEPHEDRINE/ACETAMINOPHEN 30MG-500MG
100 TABLET ORAL
Status: DISCONTINUED | OUTPATIENT
Start: 2021-01-01 | End: 2021-01-01

## 2021-01-01 RX ORDER — PHENYLEPHRINE HYDROCHLORIDE 10 MG/ML
INJECTION INTRAVENOUS
Status: DISCONTINUED | OUTPATIENT
Start: 2021-01-01 | End: 2021-01-01

## 2021-01-01 RX ORDER — SODIUM CHLORIDE 0.9 % (FLUSH) 0.9 %
10 SYRINGE (ML) INJECTION
Status: CANCELLED | OUTPATIENT
Start: 2021-01-01

## 2021-01-01 RX ORDER — IBUPROFEN 200 MG
16 TABLET ORAL
Status: DISCONTINUED | OUTPATIENT
Start: 2021-01-01 | End: 2021-01-01 | Stop reason: HOSPADM

## 2021-01-01 RX ORDER — ASPIRIN 81 MG/1
81 TABLET ORAL DAILY
Status: DISCONTINUED | OUTPATIENT
Start: 2021-01-01 | End: 2021-01-01 | Stop reason: HOSPADM

## 2021-01-01 RX ORDER — QUETIAPINE FUMARATE 25 MG/1
25 TABLET, FILM COATED ORAL NIGHTLY PRN
Qty: 30 TABLET | Refills: 11
Start: 2021-01-01 | End: 2022-01-01

## 2021-01-01 RX ORDER — SENNOSIDES 8.6 MG/1
1 TABLET ORAL 2 TIMES DAILY
Status: DISCONTINUED | OUTPATIENT
Start: 2021-01-01 | End: 2021-01-01

## 2021-01-01 RX ORDER — HEPARIN SODIUM 1000 [USP'U]/ML
INJECTION, SOLUTION INTRAVENOUS; SUBCUTANEOUS
Status: DISCONTINUED | OUTPATIENT
Start: 2021-01-01 | End: 2021-01-01

## 2021-01-01 RX ORDER — POLYETHYLENE GLYCOL 3350 17 G/17G
17 POWDER, FOR SOLUTION ORAL 2 TIMES DAILY
Status: DISCONTINUED | OUTPATIENT
Start: 2021-01-01 | End: 2021-01-01 | Stop reason: HOSPADM

## 2021-01-01 RX ORDER — ALBUTEROL SULFATE 2.5 MG/.5ML
10 SOLUTION RESPIRATORY (INHALATION)
Status: COMPLETED | OUTPATIENT
Start: 2021-01-01 | End: 2021-01-01

## 2021-01-01 RX ORDER — AMOXICILLIN 250 MG
1 CAPSULE ORAL 2 TIMES DAILY
Status: DISCONTINUED | OUTPATIENT
Start: 2021-01-01 | End: 2021-01-01

## 2021-01-01 RX ORDER — TALC
6 POWDER (GRAM) TOPICAL NIGHTLY PRN
Status: CANCELLED | OUTPATIENT
Start: 2021-01-01

## 2021-01-01 RX ORDER — SODIUM CHLORIDE 0.9 % (FLUSH) 0.9 %
10 SYRINGE (ML) INJECTION EVERY 8 HOURS
Status: DISCONTINUED | OUTPATIENT
Start: 2021-01-01 | End: 2021-01-01 | Stop reason: HOSPADM

## 2021-01-01 RX ORDER — HEPARIN SODIUM 1000 [USP'U]/ML
INJECTION, SOLUTION INTRAVENOUS; SUBCUTANEOUS
Status: DISCONTINUED | OUTPATIENT
Start: 2021-01-01 | End: 2021-01-01 | Stop reason: HOSPADM

## 2021-01-01 RX ORDER — ATORVASTATIN CALCIUM 40 MG/1
40 TABLET, FILM COATED ORAL DAILY
Status: DISCONTINUED | OUTPATIENT
Start: 2021-01-01 | End: 2021-01-01 | Stop reason: HOSPADM

## 2021-01-01 RX ORDER — LIDOCAINE 50 MG/G
2 PATCH TOPICAL
Status: DISCONTINUED | OUTPATIENT
Start: 2021-01-01 | End: 2021-01-01 | Stop reason: HOSPADM

## 2021-01-01 RX ORDER — POLYETHYLENE GLYCOL 3350 17 G/17G
17 POWDER, FOR SOLUTION ORAL 2 TIMES DAILY PRN
Refills: 0
Start: 2021-01-01

## 2021-01-01 RX ORDER — OLANZAPINE 10 MG/2ML
5 INJECTION, POWDER, FOR SOLUTION INTRAMUSCULAR EVERY 8 HOURS PRN
Status: DISCONTINUED | OUTPATIENT
Start: 2021-01-01 | End: 2021-01-01 | Stop reason: HOSPADM

## 2021-01-01 RX ORDER — POLYETHYLENE GLYCOL 3350 17 G/17G
17 POWDER, FOR SOLUTION ORAL 2 TIMES DAILY
Status: DISCONTINUED | OUTPATIENT
Start: 2021-01-01 | End: 2021-01-01

## 2021-01-01 RX ORDER — TALC
6 POWDER (GRAM) TOPICAL NIGHTLY PRN
Status: DISCONTINUED | OUTPATIENT
Start: 2021-01-01 | End: 2021-01-01 | Stop reason: HOSPADM

## 2021-01-01 RX ORDER — SODIUM CHLORIDE 0.9 % (FLUSH) 0.9 %
5 SYRINGE (ML) INJECTION
Status: DISCONTINUED | OUTPATIENT
Start: 2021-01-01 | End: 2021-01-01 | Stop reason: HOSPADM

## 2021-01-01 RX ORDER — CEFTRIAXONE 1 G/1
1 INJECTION, POWDER, FOR SOLUTION INTRAMUSCULAR; INTRAVENOUS
Status: COMPLETED | OUTPATIENT
Start: 2021-01-01 | End: 2021-01-01

## 2021-01-01 RX ORDER — FAMOTIDINE 20 MG/1
20 TABLET, FILM COATED ORAL DAILY
Start: 2021-01-01

## 2021-01-01 RX ORDER — ACETAMINOPHEN 325 MG/1
650 TABLET ORAL EVERY 6 HOURS PRN
Status: DISCONTINUED | OUTPATIENT
Start: 2021-01-01 | End: 2021-01-01 | Stop reason: HOSPADM

## 2021-01-01 RX ORDER — ONDANSETRON 4 MG/1
4 TABLET, ORALLY DISINTEGRATING ORAL EVERY 8 HOURS PRN
Status: DISCONTINUED | OUTPATIENT
Start: 2021-01-01 | End: 2021-01-01 | Stop reason: HOSPADM

## 2021-01-01 RX ORDER — IPRATROPIUM BROMIDE AND ALBUTEROL SULFATE 2.5; .5 MG/3ML; MG/3ML
3 SOLUTION RESPIRATORY (INHALATION) EVERY 4 HOURS PRN
Status: DISCONTINUED | OUTPATIENT
Start: 2021-01-01 | End: 2021-01-01 | Stop reason: HOSPADM

## 2021-01-01 RX ORDER — AMOXICILLIN 250 MG
1 CAPSULE ORAL DAILY
Status: DISCONTINUED | OUTPATIENT
Start: 2021-01-01 | End: 2021-01-01 | Stop reason: HOSPADM

## 2021-01-01 RX ORDER — INDOMETHACIN 25 MG/1
50 CAPSULE ORAL
Status: COMPLETED | OUTPATIENT
Start: 2021-01-01 | End: 2021-01-01

## 2021-01-01 RX ORDER — LIDOCAINE HYDROCHLORIDE 10 MG/ML
INJECTION INFILTRATION; PERINEURAL
Status: DISCONTINUED | OUTPATIENT
Start: 2021-01-01 | End: 2021-01-01 | Stop reason: HOSPADM

## 2021-01-01 RX ORDER — PROPOFOL 10 MG/ML
VIAL (ML) INTRAVENOUS CONTINUOUS PRN
Status: DISCONTINUED | OUTPATIENT
Start: 2021-01-01 | End: 2021-01-01

## 2021-01-01 RX ORDER — PANTOPRAZOLE SODIUM 40 MG/1
40 TABLET, DELAYED RELEASE ORAL ONCE
Status: COMPLETED | OUTPATIENT
Start: 2021-01-01 | End: 2021-01-01

## 2021-01-01 RX ORDER — PSEUDOEPHEDRINE/ACETAMINOPHEN 30MG-500MG
100 TABLET ORAL
Status: COMPLETED | OUTPATIENT
Start: 2021-01-01 | End: 2021-01-01

## 2021-01-01 RX ORDER — ATORVASTATIN CALCIUM 20 MG/1
40 TABLET, FILM COATED ORAL DAILY
Status: DISCONTINUED | OUTPATIENT
Start: 2021-01-01 | End: 2021-01-01 | Stop reason: HOSPADM

## 2021-01-01 RX ORDER — SENNOSIDES 8.6 MG/1
1 TABLET ORAL 2 TIMES DAILY
Status: DISCONTINUED | OUTPATIENT
Start: 2021-01-01 | End: 2021-01-01 | Stop reason: HOSPADM

## 2021-01-01 RX ORDER — HYDROCODONE BITARTRATE AND ACETAMINOPHEN 500; 5 MG/1; MG/1
TABLET ORAL
Status: DISCONTINUED | OUTPATIENT
Start: 2021-01-01 | End: 2021-01-01 | Stop reason: HOSPADM

## 2021-01-01 RX ORDER — SODIUM CHLORIDE 9 MG/ML
INJECTION, SOLUTION INTRAVENOUS ONCE
Status: DISCONTINUED | OUTPATIENT
Start: 2021-01-01 | End: 2021-01-01

## 2021-01-01 RX ORDER — INSULIN ASPART 100 [IU]/ML
0-5 INJECTION, SOLUTION INTRAVENOUS; SUBCUTANEOUS
Status: DISCONTINUED | OUTPATIENT
Start: 2021-01-01 | End: 2021-01-01 | Stop reason: HOSPADM

## 2021-01-01 RX ORDER — AMLODIPINE BESYLATE 5 MG/1
5 TABLET ORAL DAILY
Status: DISCONTINUED | OUTPATIENT
Start: 2021-01-01 | End: 2021-01-01 | Stop reason: HOSPADM

## 2021-01-01 RX ORDER — SYRING-NEEDL,DISP,INSUL,0.3 ML 29 G X1/2"
296 SYRINGE, EMPTY DISPOSABLE MISCELLANEOUS
Status: COMPLETED | OUTPATIENT
Start: 2021-01-01 | End: 2021-01-01

## 2021-01-01 RX ORDER — SYRING-NEEDL,DISP,INSUL,0.3 ML 29 G X1/2"
296 SYRINGE, EMPTY DISPOSABLE MISCELLANEOUS
Status: DISCONTINUED | OUTPATIENT
Start: 2021-01-01 | End: 2021-01-01

## 2021-01-01 RX ORDER — SODIUM CHLORIDE 0.9 % (FLUSH) 0.9 %
10 SYRINGE (ML) INJECTION
Status: DISCONTINUED | OUTPATIENT
Start: 2021-01-01 | End: 2021-01-01 | Stop reason: HOSPADM

## 2021-01-01 RX ORDER — ZOLPIDEM TARTRATE 5 MG/1
5 TABLET ORAL NIGHTLY PRN
Status: DISCONTINUED | OUTPATIENT
Start: 2021-01-01 | End: 2021-01-01 | Stop reason: HOSPADM

## 2021-01-01 RX ORDER — FUROSEMIDE 10 MG/ML
120 INJECTION INTRAMUSCULAR; INTRAVENOUS ONCE
Status: COMPLETED | OUTPATIENT
Start: 2021-01-01 | End: 2021-01-01

## 2021-01-01 RX ORDER — FUROSEMIDE 10 MG/ML
80 INJECTION INTRAMUSCULAR; INTRAVENOUS 3 TIMES DAILY
Status: DISCONTINUED | OUTPATIENT
Start: 2021-01-01 | End: 2021-01-01

## 2021-01-01 RX ORDER — AMOXICILLIN 250 MG
1 CAPSULE ORAL DAILY PRN
Status: DISCONTINUED | OUTPATIENT
Start: 2021-01-01 | End: 2021-01-01 | Stop reason: HOSPADM

## 2021-01-01 RX ORDER — ONDANSETRON 2 MG/ML
INJECTION INTRAMUSCULAR; INTRAVENOUS
Status: DISCONTINUED | OUTPATIENT
Start: 2021-01-01 | End: 2021-01-01

## 2021-01-01 RX ORDER — ACETAMINOPHEN 500 MG
1000 TABLET ORAL ONCE
Status: COMPLETED | OUTPATIENT
Start: 2021-01-01 | End: 2021-01-01

## 2021-01-01 RX ORDER — ALBUTEROL SULFATE 2.5 MG/.5ML
10 SOLUTION RESPIRATORY (INHALATION) ONCE
Status: COMPLETED | OUTPATIENT
Start: 2021-01-01 | End: 2021-01-01

## 2021-01-01 RX ORDER — AMLODIPINE BESYLATE 5 MG/1
5 TABLET ORAL DAILY
Status: DISCONTINUED | OUTPATIENT
Start: 2021-01-01 | End: 2021-01-01

## 2021-01-01 RX ADMIN — Medication 10 ML: at 10:07

## 2021-01-01 RX ADMIN — Medication 250 MCG: at 01:06

## 2021-01-01 RX ADMIN — CALCIUM ACETATE 667 MG: 667 CAPSULE ORAL at 04:06

## 2021-01-01 RX ADMIN — SENNOSIDES 1 TABLET: 8.6 TABLET, FILM COATED ORAL at 08:05

## 2021-01-01 RX ADMIN — ASPIRIN 81 MG: 81 TABLET, COATED ORAL at 09:06

## 2021-01-01 RX ADMIN — ALUMINUM HYDROXIDE, MAGNESIUM HYDROXIDE, DIMETHICONE 50 ML: 200; 200; 20 LIQUID ORAL at 08:07

## 2021-01-01 RX ADMIN — POLYETHYLENE GLYCOL 3350 17 G: 17 POWDER, FOR SOLUTION ORAL at 09:06

## 2021-01-01 RX ADMIN — ZOLPIDEM TARTRATE 5 MG: 5 TABLET ORAL at 08:06

## 2021-01-01 RX ADMIN — METOPROLOL TARTRATE 25 MG: 25 TABLET, FILM COATED ORAL at 08:06

## 2021-01-01 RX ADMIN — DOCUSATE SODIUM 50MG AND SENNOSIDES 8.6MG 1 TABLET: 8.6; 5 TABLET, FILM COATED ORAL at 09:05

## 2021-01-01 RX ADMIN — MUPIROCIN: 20 OINTMENT TOPICAL at 09:07

## 2021-01-01 RX ADMIN — ONDANSETRON 8 MG: 8 TABLET, ORALLY DISINTEGRATING ORAL at 12:06

## 2021-01-01 RX ADMIN — ALBUTEROL SULFATE 10 MG: 2.5 SOLUTION RESPIRATORY (INHALATION) at 01:05

## 2021-01-01 RX ADMIN — SENNOSIDES 1 TABLET: 8.6 TABLET, FILM COATED ORAL at 03:06

## 2021-01-01 RX ADMIN — CALCIUM ACETATE 667 MG: 667 CAPSULE ORAL at 04:07

## 2021-01-01 RX ADMIN — Medication 250 MCG: at 03:06

## 2021-01-01 RX ADMIN — ASPIRIN 81 MG: 81 TABLET, COATED ORAL at 08:06

## 2021-01-01 RX ADMIN — CALCIUM ACETATE 667 MG: 667 CAPSULE ORAL at 08:07

## 2021-01-01 RX ADMIN — ATORVASTATIN CALCIUM 40 MG: 40 TABLET, FILM COATED ORAL at 03:06

## 2021-01-01 RX ADMIN — LIDOCAINE 2 PATCH: 50 PATCH TOPICAL at 12:06

## 2021-01-01 RX ADMIN — SODIUM CHLORIDE: 0.9 INJECTION, SOLUTION INTRAVENOUS at 01:05

## 2021-01-01 RX ADMIN — CALCIUM ACETATE 667 MG: 667 CAPSULE ORAL at 08:06

## 2021-01-01 RX ADMIN — CYANOCOBALAMIN TAB 250 MCG 250 MCG: 250 TAB at 02:09

## 2021-01-01 RX ADMIN — SODIUM CHLORIDE 100 ML/HR: 0.9 INJECTION, SOLUTION INTRAVENOUS at 07:06

## 2021-01-01 RX ADMIN — CALCIUM ACETATE 667 MG: 667 CAPSULE ORAL at 07:06

## 2021-01-01 RX ADMIN — METOPROLOL TARTRATE 25 MG: 25 TABLET, FILM COATED ORAL at 09:09

## 2021-01-01 RX ADMIN — CALCIUM ACETATE 667 MG: 667 CAPSULE ORAL at 11:06

## 2021-01-01 RX ADMIN — CLOPIDOGREL BISULFATE 75 MG: 75 TABLET, FILM COATED ORAL at 08:06

## 2021-01-01 RX ADMIN — CALCIUM ACETATE 667 MG: 667 CAPSULE ORAL at 11:05

## 2021-01-01 RX ADMIN — FAMOTIDINE 20 MG: 20 TABLET ORAL at 02:09

## 2021-01-01 RX ADMIN — INSULIN ASPART 2 UNITS: 100 INJECTION, SOLUTION INTRAVENOUS; SUBCUTANEOUS at 04:07

## 2021-01-01 RX ADMIN — ASPIRIN 81 MG: 81 TABLET, COATED ORAL at 10:06

## 2021-01-01 RX ADMIN — EPOETIN ALFA-EPBX 4000 UNITS: 10000 INJECTION, SOLUTION INTRAVENOUS; SUBCUTANEOUS at 12:06

## 2021-01-01 RX ADMIN — HEPARIN SODIUM 5000 UNITS: 1000 INJECTION, SOLUTION INTRAVENOUS; SUBCUTANEOUS at 04:05

## 2021-01-01 RX ADMIN — CALCIUM ACETATE 667 MG: 667 CAPSULE ORAL at 12:06

## 2021-01-01 RX ADMIN — ATORVASTATIN CALCIUM 40 MG: 40 TABLET, FILM COATED ORAL at 12:06

## 2021-01-01 RX ADMIN — FUROSEMIDE 80 MG: 10 INJECTION, SOLUTION INTRAMUSCULAR; INTRAVENOUS at 08:05

## 2021-01-01 RX ADMIN — Medication 1 CAPSULE: at 09:06

## 2021-01-01 RX ADMIN — METOPROLOL TARTRATE 25 MG: 25 TABLET, FILM COATED ORAL at 09:06

## 2021-01-01 RX ADMIN — INSULIN ASPART 2 UNITS: 100 INJECTION, SOLUTION INTRAVENOUS; SUBCUTANEOUS at 04:06

## 2021-01-01 RX ADMIN — AMLODIPINE BESYLATE 5 MG: 5 TABLET ORAL at 09:07

## 2021-01-01 RX ADMIN — APIXABAN 2.5 MG: 5 TABLET, FILM COATED ORAL at 10:06

## 2021-01-01 RX ADMIN — POLYETHYLENE GLYCOL 3350 17 G: 17 POWDER, FOR SOLUTION ORAL at 08:06

## 2021-01-01 RX ADMIN — TRAMADOL HYDROCHLORIDE 50 MG: 50 TABLET, COATED ORAL at 08:06

## 2021-01-01 RX ADMIN — ACETAMINOPHEN 650 MG: 325 TABLET, FILM COATED ORAL at 08:07

## 2021-01-01 RX ADMIN — INSULIN DETEMIR 8 UNITS: 100 INJECTION, SOLUTION SUBCUTANEOUS at 08:06

## 2021-01-01 RX ADMIN — MUPIROCIN: 20 OINTMENT TOPICAL at 08:07

## 2021-01-01 RX ADMIN — APIXABAN 2.5 MG: 2.5 TABLET, FILM COATED ORAL at 09:05

## 2021-01-01 RX ADMIN — INSULIN DETEMIR 13 UNITS: 100 INJECTION, SOLUTION SUBCUTANEOUS at 05:06

## 2021-01-01 RX ADMIN — CALCIUM ACETATE 667 MG: 667 CAPSULE ORAL at 01:09

## 2021-01-01 RX ADMIN — APIXABAN 2.5 MG: 5 TABLET, FILM COATED ORAL at 08:06

## 2021-01-01 RX ADMIN — POLYETHYLENE GLYCOL 3350 17 G: 17 POWDER, FOR SOLUTION ORAL at 10:06

## 2021-01-01 RX ADMIN — CLOPIDOGREL BISULFATE 75 MG: 75 TABLET, FILM COATED ORAL at 09:06

## 2021-01-01 RX ADMIN — APIXABAN 2.5 MG: 2.5 TABLET, FILM COATED ORAL at 08:07

## 2021-01-01 RX ADMIN — ASPIRIN 81 MG: 81 TABLET, COATED ORAL at 10:09

## 2021-01-01 RX ADMIN — POLYETHYLENE GLYCOL 3350 17 G: 17 POWDER, FOR SOLUTION ORAL at 09:05

## 2021-01-01 RX ADMIN — CLOPIDOGREL BISULFATE 75 MG: 75 TABLET, FILM COATED ORAL at 01:06

## 2021-01-01 RX ADMIN — SENNOSIDES 1 TABLET: 8.6 TABLET, FILM COATED ORAL at 10:06

## 2021-01-01 RX ADMIN — ONDANSETRON 4 MG: 2 INJECTION INTRAMUSCULAR; INTRAVENOUS at 05:05

## 2021-01-01 RX ADMIN — ATORVASTATIN CALCIUM 40 MG: 40 TABLET, FILM COATED ORAL at 09:05

## 2021-01-01 RX ADMIN — METOPROLOL TARTRATE 25 MG: 25 TABLET, FILM COATED ORAL at 09:07

## 2021-01-01 RX ADMIN — CEFTRIAXONE 1 G: 1 INJECTION, POWDER, FOR SOLUTION INTRAMUSCULAR; INTRAVENOUS at 11:05

## 2021-01-01 RX ADMIN — METOPROLOL TARTRATE 25 MG: 25 TABLET, FILM COATED ORAL at 08:07

## 2021-01-01 RX ADMIN — ATORVASTATIN CALCIUM 40 MG: 20 TABLET, FILM COATED ORAL at 09:07

## 2021-01-01 RX ADMIN — APIXABAN 2.5 MG: 2.5 TABLET, FILM COATED ORAL at 09:07

## 2021-01-01 RX ADMIN — SODIUM CHLORIDE: 0.9 INJECTION, SOLUTION INTRAVENOUS at 09:06

## 2021-01-01 RX ADMIN — INSULIN DETEMIR 13 UNITS: 100 INJECTION, SOLUTION SUBCUTANEOUS at 09:06

## 2021-01-01 RX ADMIN — CALCIUM ACETATE 667 MG: 667 CAPSULE ORAL at 09:09

## 2021-01-01 RX ADMIN — SODIUM ZIRCONIUM CYCLOSILICATE 10 G: 10 POWDER, FOR SUSPENSION ORAL at 08:05

## 2021-01-01 RX ADMIN — CLOPIDOGREL BISULFATE 75 MG: 75 TABLET, FILM COATED ORAL at 09:07

## 2021-01-01 RX ADMIN — NEPHROCAP 1 CAPSULE: 1 CAP ORAL at 08:07

## 2021-01-01 RX ADMIN — AMLODIPINE BESYLATE 5 MG: 5 TABLET ORAL at 08:07

## 2021-01-01 RX ADMIN — TRAMADOL HYDROCHLORIDE 50 MG: 50 TABLET, COATED ORAL at 04:06

## 2021-01-01 RX ADMIN — INSULIN ASPART 2 UNITS: 100 INJECTION, SOLUTION INTRAVENOUS; SUBCUTANEOUS at 08:07

## 2021-01-01 RX ADMIN — ASPIRIN 81 MG: 81 TABLET, COATED ORAL at 08:05

## 2021-01-01 RX ADMIN — SENNOSIDES 1 TABLET: 8.6 TABLET, FILM COATED ORAL at 09:06

## 2021-01-01 RX ADMIN — POLYETHYLENE GLYCOL 3350 17 G: 17 POWDER, FOR SOLUTION ORAL at 08:05

## 2021-01-01 RX ADMIN — CALCIUM ACETATE 667 MG: 667 CAPSULE ORAL at 09:05

## 2021-01-01 RX ADMIN — CALCIUM ACETATE 667 MG: 667 CAPSULE ORAL at 01:06

## 2021-01-01 RX ADMIN — Medication 250 MCG: at 10:06

## 2021-01-01 RX ADMIN — QUETIAPINE FUMARATE 25 MG: 25 TABLET ORAL at 08:06

## 2021-01-01 RX ADMIN — Medication 250 MCG: at 09:06

## 2021-01-01 RX ADMIN — Medication 250 MCG: at 09:05

## 2021-01-01 RX ADMIN — Medication 10 ML: at 02:07

## 2021-01-01 RX ADMIN — INSULIN ASPART 3 UNITS: 100 INJECTION, SOLUTION INTRAVENOUS; SUBCUTANEOUS at 02:07

## 2021-01-01 RX ADMIN — CHLOROTHIAZIDE SODIUM 500 MG: 500 INJECTION, POWDER, LYOPHILIZED, FOR SOLUTION INTRAVENOUS at 05:05

## 2021-01-01 RX ADMIN — CLOPIDOGREL BISULFATE 75 MG: 75 TABLET, FILM COATED ORAL at 10:06

## 2021-01-01 RX ADMIN — ATORVASTATIN CALCIUM 40 MG: 20 TABLET, FILM COATED ORAL at 08:07

## 2021-01-01 RX ADMIN — ASPIRIN 81 MG: 81 TABLET, COATED ORAL at 09:09

## 2021-01-01 RX ADMIN — CALCIUM ACETATE 667 MG: 667 CAPSULE ORAL at 05:06

## 2021-01-01 RX ADMIN — SENNOSIDES 1 TABLET: 8.6 TABLET, FILM COATED ORAL at 08:06

## 2021-01-01 RX ADMIN — TRAMADOL HYDROCHLORIDE 50 MG: 50 TABLET, COATED ORAL at 07:06

## 2021-01-01 RX ADMIN — FAMOTIDINE 20 MG: 20 TABLET, FILM COATED ORAL at 08:07

## 2021-01-01 RX ADMIN — ASPIRIN 81 MG: 81 TABLET, COATED ORAL at 09:07

## 2021-01-01 RX ADMIN — APIXABAN 2.5 MG: 2.5 TABLET, FILM COATED ORAL at 08:05

## 2021-01-01 RX ADMIN — POLYETHYLENE GLYCOL 3350 17 G: 17 POWDER, FOR SOLUTION ORAL at 07:06

## 2021-01-01 RX ADMIN — AMLODIPINE BESYLATE 5 MG: 5 TABLET ORAL at 01:06

## 2021-01-01 RX ADMIN — METOPROLOL TARTRATE 25 MG: 25 TABLET, FILM COATED ORAL at 01:06

## 2021-01-01 RX ADMIN — APIXABAN 2.5 MG: 5 TABLET, FILM COATED ORAL at 09:06

## 2021-01-01 RX ADMIN — AMLODIPINE BESYLATE 5 MG: 5 TABLET ORAL at 09:06

## 2021-01-01 RX ADMIN — ASPIRIN 81 MG: 81 TABLET, COATED ORAL at 03:06

## 2021-01-01 RX ADMIN — QUETIAPINE FUMARATE 25 MG: 25 TABLET ORAL at 11:05

## 2021-01-01 RX ADMIN — CALCIUM ACETATE 667 MG: 667 CAPSULE ORAL at 05:05

## 2021-01-01 RX ADMIN — Medication 1 CAPSULE: at 08:05

## 2021-01-01 RX ADMIN — Medication 250 MCG: at 08:05

## 2021-01-01 RX ADMIN — ATORVASTATIN CALCIUM 40 MG: 40 TABLET, FILM COATED ORAL at 09:06

## 2021-01-01 RX ADMIN — CYANOCOBALAMIN TAB 250 MCG 250 MCG: 250 TAB at 10:09

## 2021-01-01 RX ADMIN — ATORVASTATIN CALCIUM 40 MG: 40 TABLET, FILM COATED ORAL at 08:05

## 2021-01-01 RX ADMIN — CALCIUM ACETATE 667 MG: 667 CAPSULE ORAL at 08:05

## 2021-01-01 RX ADMIN — QUETIAPINE FUMARATE 25 MG: 25 TABLET ORAL at 10:06

## 2021-01-01 RX ADMIN — AMLODIPINE BESYLATE 5 MG: 5 TABLET ORAL at 10:06

## 2021-01-01 RX ADMIN — PHENYLEPHRINE HYDROCHLORIDE 200 MCG: 10 INJECTION INTRAVENOUS at 04:05

## 2021-01-01 RX ADMIN — APIXABAN 2.5 MG: 5 TABLET, FILM COATED ORAL at 01:06

## 2021-01-01 RX ADMIN — METOPROLOL TARTRATE 25 MG: 25 TABLET, FILM COATED ORAL at 12:06

## 2021-01-01 RX ADMIN — Medication 1 CAPSULE: at 08:06

## 2021-01-01 RX ADMIN — APIXABAN 2.5 MG: 5 TABLET, FILM COATED ORAL at 07:06

## 2021-01-01 RX ADMIN — SODIUM ZIRCONIUM CYCLOSILICATE 5 G: 10 POWDER, FOR SUSPENSION ORAL at 09:05

## 2021-01-01 RX ADMIN — ATORVASTATIN CALCIUM 40 MG: 40 TABLET, FILM COATED ORAL at 01:06

## 2021-01-01 RX ADMIN — PARICALCITOL 5 MCG: 5 INJECTION, SOLUTION INTRAVENOUS at 11:07

## 2021-01-01 RX ADMIN — METOPROLOL TARTRATE 25 MG: 25 TABLET, FILM COATED ORAL at 09:05

## 2021-01-01 RX ADMIN — ACETAMINOPHEN 650 MG: 325 TABLET, FILM COATED ORAL at 02:07

## 2021-01-01 RX ADMIN — Medication 1 CAPSULE: at 05:06

## 2021-01-01 RX ADMIN — LIDOCAINE 2 PATCH: 50 PATCH TOPICAL at 03:06

## 2021-01-01 RX ADMIN — SODIUM CHLORIDE: 0.9 INJECTION, SOLUTION INTRAVENOUS at 03:05

## 2021-01-01 RX ADMIN — SODIUM ZIRCONIUM CYCLOSILICATE 10 G: 10 POWDER, FOR SUSPENSION ORAL at 03:05

## 2021-01-01 RX ADMIN — APIXABAN 2.5 MG: 5 TABLET, FILM COATED ORAL at 03:06

## 2021-01-01 RX ADMIN — QUETIAPINE FUMARATE 25 MG: 25 TABLET ORAL at 09:07

## 2021-01-01 RX ADMIN — CHLOROTHIAZIDE SODIUM 500 MG: 500 INJECTION, POWDER, LYOPHILIZED, FOR SOLUTION INTRAVENOUS at 03:05

## 2021-01-01 RX ADMIN — PARICALCITOL 5 MCG: 5 INJECTION, SOLUTION INTRAVENOUS at 06:07

## 2021-01-01 RX ADMIN — Medication 1 CAPSULE: at 03:06

## 2021-01-01 RX ADMIN — CALCIUM ACETATE 667 MG: 667 CAPSULE ORAL at 01:07

## 2021-01-01 RX ADMIN — PANTOPRAZOLE SODIUM 40 MG: 40 TABLET, DELAYED RELEASE ORAL at 05:07

## 2021-01-01 RX ADMIN — QUETIAPINE FUMARATE 25 MG: 25 TABLET ORAL at 07:06

## 2021-01-01 RX ADMIN — INSULIN DETEMIR 8 UNITS: 100 INJECTION, SOLUTION SUBCUTANEOUS at 01:06

## 2021-01-01 RX ADMIN — FUROSEMIDE 120 MG: 10 INJECTION, SOLUTION INTRAMUSCULAR; INTRAVENOUS at 04:05

## 2021-01-01 RX ADMIN — PARICALCITOL 5 MCG: 5 INJECTION, SOLUTION INTRAVENOUS at 12:07

## 2021-01-01 RX ADMIN — Medication 16 G: at 12:05

## 2021-01-01 RX ADMIN — SENNOSIDES 1 TABLET: 8.6 TABLET, FILM COATED ORAL at 01:06

## 2021-01-01 RX ADMIN — Medication 1 CAPSULE: at 10:06

## 2021-01-01 RX ADMIN — CLOPIDOGREL 75 MG: 75 TABLET, FILM COATED ORAL at 09:05

## 2021-01-01 RX ADMIN — ZOLPIDEM TARTRATE 5 MG: 5 TABLET ORAL at 07:06

## 2021-01-01 RX ADMIN — ASPIRIN 81 MG: 81 TABLET, COATED ORAL at 09:05

## 2021-01-01 RX ADMIN — Medication 1 CAPSULE: at 09:05

## 2021-01-01 RX ADMIN — ACETAMINOPHEN 1000 MG: 500 TABLET ORAL at 03:06

## 2021-01-01 RX ADMIN — CALCIUM ACETATE 667 MG: 667 CAPSULE ORAL at 09:07

## 2021-01-01 RX ADMIN — INSULIN DETEMIR 10 UNITS: 100 INJECTION, SOLUTION SUBCUTANEOUS at 09:07

## 2021-01-01 RX ADMIN — CLOPIDOGREL BISULFATE 75 MG: 75 TABLET, FILM COATED ORAL at 12:06

## 2021-01-01 RX ADMIN — INSULIN ASPART 3 UNITS: 100 INJECTION, SOLUTION INTRAVENOUS; SUBCUTANEOUS at 12:06

## 2021-01-01 RX ADMIN — SENNOSIDES 1 TABLET: 8.6 TABLET, FILM COATED ORAL at 09:05

## 2021-01-01 RX ADMIN — CEFTRIAXONE 1 G: 1 INJECTION, POWDER, FOR SOLUTION INTRAMUSCULAR; INTRAVENOUS at 06:05

## 2021-01-01 RX ADMIN — CEFTRIAXONE 1 G: 1 INJECTION, POWDER, FOR SOLUTION INTRAMUSCULAR; INTRAVENOUS at 02:05

## 2021-01-01 RX ADMIN — FENTANYL CITRATE 25 MCG: 50 INJECTION, SOLUTION INTRAMUSCULAR; INTRAVENOUS at 04:05

## 2021-01-01 RX ADMIN — CALCIUM ACETATE 667 MG: 667 CAPSULE ORAL at 02:07

## 2021-01-01 RX ADMIN — EPOETIN ALFA-EPBX 4000 UNITS: 10000 INJECTION, SOLUTION INTRAVENOUS; SUBCUTANEOUS at 10:06

## 2021-01-01 RX ADMIN — CYANOCOBALAMIN TAB 250 MCG 250 MCG: 250 TAB at 09:09

## 2021-01-01 RX ADMIN — Medication 10 ML: at 06:07

## 2021-01-01 RX ADMIN — Medication 10 ML: at 11:07

## 2021-01-01 RX ADMIN — Medication 10 ML: at 09:07

## 2021-01-01 RX ADMIN — ATORVASTATIN CALCIUM 40 MG: 20 TABLET, FILM COATED ORAL at 09:09

## 2021-01-01 RX ADMIN — TRAMADOL HYDROCHLORIDE 50 MG: 50 TABLET, COATED ORAL at 10:06

## 2021-01-01 RX ADMIN — ASPIRIN 81 MG: 81 TABLET, COATED ORAL at 01:06

## 2021-01-01 RX ADMIN — FENTANYL CITRATE 50 MCG: 50 INJECTION, SOLUTION INTRAMUSCULAR; INTRAVENOUS at 04:05

## 2021-01-01 RX ADMIN — NEPHROCAP 1 CAPSULE: 1 CAP ORAL at 09:07

## 2021-01-01 RX ADMIN — ATORVASTATIN CALCIUM 40 MG: 20 TABLET, FILM COATED ORAL at 10:09

## 2021-01-01 RX ADMIN — CALCIUM ACETATE 667 MG: 667 CAPSULE ORAL at 10:09

## 2021-01-01 RX ADMIN — CALCIUM ACETATE 667 MG: 667 CAPSULE ORAL at 03:06

## 2021-01-01 RX ADMIN — QUETIAPINE FUMARATE 25 MG: 25 TABLET ORAL at 11:09

## 2021-01-01 RX ADMIN — ASPIRIN 81 MG: 81 TABLET, COATED ORAL at 08:07

## 2021-01-01 RX ADMIN — CALCIUM ACETATE 667 MG: 667 CAPSULE ORAL at 09:06

## 2021-01-01 RX ADMIN — Medication 1 CAPSULE: at 01:06

## 2021-01-01 RX ADMIN — MAGNESIUM CITRATE 296 ML: 1.75 LIQUID ORAL at 04:06

## 2021-01-01 RX ADMIN — ASPIRIN 81 MG: 81 TABLET, COATED ORAL at 12:06

## 2021-01-01 RX ADMIN — ATORVASTATIN CALCIUM 40 MG: 40 TABLET, FILM COATED ORAL at 08:06

## 2021-01-01 RX ADMIN — METOPROLOL TARTRATE 25 MG: 25 TABLET, FILM COATED ORAL at 10:09

## 2021-01-01 RX ADMIN — AMLODIPINE BESYLATE 5 MG: 5 TABLET ORAL at 08:06

## 2021-01-01 RX ADMIN — TUBERCULIN PURIFIED PROTEIN DERIVATIVE 5 UNITS: 5 INJECTION, SOLUTION INTRADERMAL at 06:07

## 2021-01-01 RX ADMIN — SENNOSIDES 1 TABLET: 8.6 TABLET, FILM COATED ORAL at 07:06

## 2021-01-01 RX ADMIN — MUPIROCIN: 20 OINTMENT TOPICAL at 11:07

## 2021-01-01 RX ADMIN — Medication 100 ML: at 04:06

## 2021-01-01 RX ADMIN — CLOPIDOGREL BISULFATE 75 MG: 75 TABLET, FILM COATED ORAL at 03:06

## 2021-01-01 RX ADMIN — FAMOTIDINE 20 MG: 20 TABLET ORAL at 10:09

## 2021-01-01 RX ADMIN — SODIUM CHLORIDE 500 ML: 0.9 INJECTION, SOLUTION INTRAVENOUS at 02:06

## 2021-01-01 RX ADMIN — SODIUM BICARBONATE 50 MEQ: 84 INJECTION, SOLUTION INTRAVENOUS at 03:09

## 2021-01-01 RX ADMIN — CLOPIDOGREL 75 MG: 75 TABLET, FILM COATED ORAL at 08:05

## 2021-01-01 RX ADMIN — STANDARDIZED SENNA CONCENTRATE 1 TABLET: 8.6 TABLET ORAL at 09:07

## 2021-01-01 RX ADMIN — METOPROLOL TARTRATE 25 MG: 25 TABLET, FILM COATED ORAL at 11:07

## 2021-01-01 RX ADMIN — ACETAMINOPHEN 650 MG: 325 TABLET, FILM COATED ORAL at 09:07

## 2021-01-01 RX ADMIN — Medication 10 ML: at 01:07

## 2021-01-01 RX ADMIN — CALCIUM ACETATE 667 MG: 667 CAPSULE ORAL at 06:09

## 2021-01-01 RX ADMIN — CLOPIDOGREL 75 MG: 75 TABLET, FILM COATED ORAL at 02:09

## 2021-01-01 RX ADMIN — CLOPIDOGREL 75 MG: 75 TABLET, FILM COATED ORAL at 10:09

## 2021-01-01 RX ADMIN — MUPIROCIN: 20 OINTMENT TOPICAL at 09:05

## 2021-01-01 RX ADMIN — CLOPIDOGREL 75 MG: 75 TABLET, FILM COATED ORAL at 09:09

## 2021-01-01 RX ADMIN — POLYETHYLENE GLYCOL 3350 17 G: 17 POWDER, FOR SOLUTION ORAL at 09:07

## 2021-01-01 RX ADMIN — Medication 16 G: at 11:05

## 2021-01-01 RX ADMIN — ASPIRIN 81 MG: 81 TABLET, COATED ORAL at 02:09

## 2021-01-01 RX ADMIN — ZOLPIDEM TARTRATE 5 MG: 5 TABLET ORAL at 02:06

## 2021-01-01 RX ADMIN — OLANZAPINE 5 MG: 10 INJECTION, POWDER, LYOPHILIZED, FOR SOLUTION INTRAMUSCULAR at 06:07

## 2021-01-01 RX ADMIN — DOCUSATE SODIUM 50 MG AND SENNOSIDES 8.6 MG 1 TABLET: 8.6; 5 TABLET, FILM COATED ORAL at 09:07

## 2021-01-01 RX ADMIN — FUROSEMIDE 80 MG: 10 INJECTION, SOLUTION INTRAMUSCULAR; INTRAVENOUS at 05:05

## 2021-01-01 RX ADMIN — METOPROLOL TARTRATE 25 MG: 25 TABLET, FILM COATED ORAL at 08:05

## 2021-01-01 RX ADMIN — CALCIUM ACETATE 667 MG: 667 CAPSULE ORAL at 02:09

## 2021-01-01 RX ADMIN — PROPOFOL 50 MCG/KG/MIN: 10 INJECTION, EMULSION INTRAVENOUS at 04:05

## 2021-01-01 RX ADMIN — DOCUSATE SODIUM 50MG AND SENNOSIDES 8.6MG 1 TABLET: 8.6; 5 TABLET, FILM COATED ORAL at 08:05

## 2021-01-01 RX ADMIN — AMLODIPINE BESYLATE 5 MG: 5 TABLET ORAL at 04:06

## 2021-01-01 RX ADMIN — METOPROLOL TARTRATE 25 MG: 25 TABLET, FILM COATED ORAL at 04:06

## 2021-01-01 RX ADMIN — QUETIAPINE FUMARATE 25 MG: 25 TABLET ORAL at 09:06

## 2021-01-01 RX ADMIN — ALBUTEROL SULFATE 10 MG: 2.5 SOLUTION RESPIRATORY (INHALATION) at 02:05

## 2021-01-01 RX ADMIN — CLOPIDOGREL BISULFATE 75 MG: 75 TABLET, FILM COATED ORAL at 08:07

## 2021-01-01 RX ADMIN — SODIUM ZIRCONIUM CYCLOSILICATE 10 G: 10 POWDER, FOR SUSPENSION ORAL at 09:05

## 2021-01-01 RX ADMIN — POLYETHYLENE GLYCOL 3350 17 G: 17 POWDER, FOR SOLUTION ORAL at 03:06

## 2021-01-01 RX ADMIN — FAMOTIDINE 20 MG: 20 TABLET, FILM COATED ORAL at 02:07

## 2021-01-01 RX ADMIN — TRAMADOL HYDROCHLORIDE 50 MG: 50 TABLET, COATED ORAL at 01:06

## 2021-01-01 RX ADMIN — TRAMADOL HYDROCHLORIDE 50 MG: 50 TABLET, COATED ORAL at 12:06

## 2021-01-01 RX ADMIN — FUROSEMIDE 80 MG: 10 INJECTION, SOLUTION INTRAMUSCULAR; INTRAVENOUS at 12:05

## 2021-01-01 RX ADMIN — ZOLPIDEM TARTRATE 5 MG: 5 TABLET ORAL at 09:06

## 2021-01-01 RX ADMIN — METOPROLOL TARTRATE 25 MG: 25 TABLET, FILM COATED ORAL at 02:09

## 2021-01-01 RX ADMIN — MELATONIN TAB 3 MG 6 MG: 3 TAB at 11:05

## 2021-01-01 RX ADMIN — ZOLPIDEM TARTRATE 5 MG: 5 TABLET ORAL at 10:06

## 2021-01-01 RX ADMIN — Medication 250 MCG: at 08:06

## 2021-01-01 RX ADMIN — METOPROLOL TARTRATE 25 MG: 25 TABLET, FILM COATED ORAL at 10:06

## 2021-01-01 RX ADMIN — ALBUTEROL SULFATE 10 MG: 2.5 SOLUTION RESPIRATORY (INHALATION) at 03:09

## 2021-01-01 RX ADMIN — INSULIN DETEMIR 10 UNITS: 100 INJECTION, SOLUTION SUBCUTANEOUS at 08:07

## 2021-01-01 RX ADMIN — FAMOTIDINE 20 MG: 20 TABLET ORAL at 09:09

## 2021-01-01 RX ADMIN — ATORVASTATIN CALCIUM 40 MG: 40 TABLET, FILM COATED ORAL at 10:06

## 2021-01-01 RX ADMIN — FUROSEMIDE 80 MG: 10 INJECTION, SOLUTION INTRAMUSCULAR; INTRAVENOUS at 02:05

## 2021-01-01 RX ADMIN — Medication 6 MG: at 09:07

## 2021-01-01 RX ADMIN — ALUMINUM HYDROXIDE, MAGNESIUM HYDROXIDE, DIMETHICONE 50 ML: 200; 200; 20 LIQUID ORAL at 02:07

## 2021-01-01 RX ADMIN — INSULIN ASPART 1 UNITS: 100 INJECTION, SOLUTION INTRAVENOUS; SUBCUTANEOUS at 08:06

## 2021-01-01 RX ADMIN — ATORVASTATIN CALCIUM 40 MG: 20 TABLET, FILM COATED ORAL at 02:09

## 2021-05-07 ENCOUNTER — HOSPITAL ENCOUNTER (INPATIENT)
Facility: HOSPITAL | Age: 76
LOS: 5 days | Discharge: NURSING, REHAB OR LONG-TERM CARE | DRG: 177 | End: 2021-05-18
Attending: EMERGENCY MEDICINE | Admitting: INTERNAL MEDICINE
Payer: MEDICARE

## 2021-05-07 DIAGNOSIS — I10 HTN (HYPERTENSION): ICD-10-CM

## 2021-05-07 DIAGNOSIS — D63.1 ANEMIA OF CHRONIC RENAL FAILURE, STAGE 5: Chronic | ICD-10-CM

## 2021-05-07 DIAGNOSIS — Z99.2 CKD (CHRONIC KIDNEY DISEASE) REQUIRING CHRONIC DIALYSIS: ICD-10-CM

## 2021-05-07 DIAGNOSIS — G93.40 ACUTE ENCEPHALOPATHY: ICD-10-CM

## 2021-05-07 DIAGNOSIS — N18.5 ANEMIA OF CHRONIC RENAL FAILURE, STAGE 5: Chronic | ICD-10-CM

## 2021-05-07 DIAGNOSIS — N18.6 CKD (CHRONIC KIDNEY DISEASE) REQUIRING CHRONIC DIALYSIS: ICD-10-CM

## 2021-05-07 DIAGNOSIS — Z99.2 ENCOUNTER FOR DIALYSIS: Primary | ICD-10-CM

## 2021-05-07 DIAGNOSIS — E11.9 TYPE 2 DIABETES MELLITUS WITHOUT COMPLICATION, UNSPECIFIED WHETHER LONG TERM INSULIN USE: ICD-10-CM

## 2021-05-07 DIAGNOSIS — N18.6 ESRD (END STAGE RENAL DISEASE): Chronic | ICD-10-CM

## 2021-05-07 DIAGNOSIS — E87.70 VOLUME OVERLOAD: ICD-10-CM

## 2021-05-07 PROBLEM — E78.5 DYSLIPIDEMIA: Status: ACTIVE | Noted: 2019-03-31

## 2021-05-07 PROBLEM — E78.5 DYSLIPIDEMIA: Chronic | Status: ACTIVE | Noted: 2019-03-31

## 2021-05-07 PROBLEM — I50.32 CHRONIC DIASTOLIC CONGESTIVE HEART FAILURE: Chronic | Status: ACTIVE | Noted: 2019-12-18

## 2021-05-07 PROBLEM — I50.9 CONGESTIVE HEART FAILURE: Status: ACTIVE | Noted: 2019-12-18

## 2021-05-07 PROBLEM — N25.81 SECONDARY HYPERPARATHYROIDISM OF RENAL ORIGIN: Chronic | Status: ACTIVE | Noted: 2019-03-31

## 2021-05-07 PROBLEM — N25.81 SECONDARY HYPERPARATHYROIDISM OF RENAL ORIGIN: Status: ACTIVE | Noted: 2019-03-31

## 2021-05-07 LAB
ANION GAP SERPL CALC-SCNC: 16 MMOL/L (ref 8–16)
BASOPHILS # BLD AUTO: 0.03 K/UL (ref 0–0.2)
BASOPHILS NFR BLD: 0.6 % (ref 0–1.9)
BNP SERPL-MCNC: 1603 PG/ML (ref 0–99)
BUN SERPL-MCNC: 61 MG/DL (ref 8–23)
CALCIUM SERPL-MCNC: 7.7 MG/DL (ref 8.7–10.5)
CHLORIDE SERPL-SCNC: 107 MMOL/L (ref 95–110)
CO2 SERPL-SCNC: 19 MMOL/L (ref 23–29)
CREAT SERPL-MCNC: 11.8 MG/DL (ref 0.5–1.4)
CTP QC/QA: YES
DIFFERENTIAL METHOD: ABNORMAL
EOSINOPHIL # BLD AUTO: 0.3 K/UL (ref 0–0.5)
EOSINOPHIL NFR BLD: 5.6 % (ref 0–8)
ERYTHROCYTE [DISTWIDTH] IN BLOOD BY AUTOMATED COUNT: 14.6 % (ref 11.5–14.5)
EST. GFR  (AFRICAN AMERICAN): 3.2 ML/MIN/1.73 M^2
EST. GFR  (NON AFRICAN AMERICAN): 2.8 ML/MIN/1.73 M^2
GLUCOSE SERPL-MCNC: 82 MG/DL (ref 70–110)
HCT VFR BLD AUTO: 35.2 % (ref 37–48.5)
HGB BLD-MCNC: 11.2 G/DL (ref 12–16)
IMM GRANULOCYTES # BLD AUTO: 0.02 K/UL (ref 0–0.04)
IMM GRANULOCYTES NFR BLD AUTO: 0.4 % (ref 0–0.5)
LYMPHOCYTES # BLD AUTO: 0.9 K/UL (ref 1–4.8)
LYMPHOCYTES NFR BLD: 17.6 % (ref 18–48)
MCH RBC QN AUTO: 30.4 PG (ref 27–31)
MCHC RBC AUTO-ENTMCNC: 31.8 G/DL (ref 32–36)
MCV RBC AUTO: 95 FL (ref 82–98)
MONOCYTES # BLD AUTO: 0.5 K/UL (ref 0.3–1)
MONOCYTES NFR BLD: 9.9 % (ref 4–15)
NEUTROPHILS # BLD AUTO: 3.5 K/UL (ref 1.8–7.7)
NEUTROPHILS NFR BLD: 65.9 % (ref 38–73)
NRBC BLD-RTO: 0 /100 WBC
PLATELET # BLD AUTO: 107 K/UL (ref 150–450)
PMV BLD AUTO: 13.7 FL (ref 9.2–12.9)
POCT GLUCOSE: 167 MG/DL (ref 70–110)
POTASSIUM SERPL-SCNC: 4.3 MMOL/L (ref 3.5–5.1)
RBC # BLD AUTO: 3.69 M/UL (ref 4–5.4)
SARS-COV-2 RDRP RESP QL NAA+PROBE: NEGATIVE
SODIUM SERPL-SCNC: 142 MMOL/L (ref 136–145)
WBC # BLD AUTO: 5.34 K/UL (ref 3.9–12.7)

## 2021-05-07 PROCEDURE — 25000003 PHARM REV CODE 250: Performed by: PHYSICIAN ASSISTANT

## 2021-05-07 PROCEDURE — C9399 UNCLASSIFIED DRUGS OR BIOLOG: HCPCS | Performed by: PHYSICIAN ASSISTANT

## 2021-05-07 PROCEDURE — 99285 EMERGENCY DEPT VISIT HI MDM: CPT | Mod: ,,, | Performed by: NURSE PRACTITIONER

## 2021-05-07 PROCEDURE — G0378 HOSPITAL OBSERVATION PER HR: HCPCS

## 2021-05-07 PROCEDURE — 99220 PR INITIAL OBSERVATION CARE,LEVL III: CPT | Mod: CS,,, | Performed by: PHYSICIAN ASSISTANT

## 2021-05-07 PROCEDURE — U0002 COVID-19 LAB TEST NON-CDC: HCPCS | Performed by: NURSE PRACTITIONER

## 2021-05-07 PROCEDURE — 93010 EKG 12-LEAD: ICD-10-PCS | Mod: ,,, | Performed by: INTERNAL MEDICINE

## 2021-05-07 PROCEDURE — 99285 PR EMERGENCY DEPT VISIT,LEVEL V: ICD-10-PCS | Mod: ,,, | Performed by: NURSE PRACTITIONER

## 2021-05-07 PROCEDURE — 99285 EMERGENCY DEPT VISIT HI MDM: CPT | Mod: 25

## 2021-05-07 PROCEDURE — 93005 ELECTROCARDIOGRAM TRACING: CPT

## 2021-05-07 PROCEDURE — 85025 COMPLETE CBC W/AUTO DIFF WBC: CPT | Performed by: NURSE PRACTITIONER

## 2021-05-07 PROCEDURE — 96372 THER/PROPH/DIAG INJ SC/IM: CPT | Mod: 59 | Performed by: EMERGENCY MEDICINE

## 2021-05-07 PROCEDURE — 93010 ELECTROCARDIOGRAM REPORT: CPT | Mod: ,,, | Performed by: INTERNAL MEDICINE

## 2021-05-07 PROCEDURE — 83880 ASSAY OF NATRIURETIC PEPTIDE: CPT | Performed by: NURSE PRACTITIONER

## 2021-05-07 PROCEDURE — 80048 BASIC METABOLIC PNL TOTAL CA: CPT | Performed by: EMERGENCY MEDICINE

## 2021-05-07 PROCEDURE — 80047 BASIC METABLC PNL IONIZED CA: CPT

## 2021-05-07 PROCEDURE — 99220 PR INITIAL OBSERVATION CARE,LEVL III: ICD-10-PCS | Mod: CS,,, | Performed by: PHYSICIAN ASSISTANT

## 2021-05-07 RX ORDER — DOCUSATE SODIUM 100 MG/1
100 CAPSULE, LIQUID FILLED ORAL
Status: ON HOLD | COMMUNITY
End: 2021-05-10 | Stop reason: CLARIF

## 2021-05-07 RX ORDER — ONDANSETRON 8 MG/1
8 TABLET, ORALLY DISINTEGRATING ORAL EVERY 8 HOURS PRN
Status: DISCONTINUED | OUTPATIENT
Start: 2021-05-07 | End: 2021-05-18 | Stop reason: HOSPADM

## 2021-05-07 RX ORDER — GLUCAGON 1 MG
1 KIT INJECTION
Status: DISCONTINUED | OUTPATIENT
Start: 2021-05-07 | End: 2021-05-18 | Stop reason: HOSPADM

## 2021-05-07 RX ORDER — SODIUM CHLORIDE 9 MG/ML
INJECTION, SOLUTION INTRAVENOUS ONCE
Status: DISCONTINUED | OUTPATIENT
Start: 2021-05-07 | End: 2021-05-09

## 2021-05-07 RX ORDER — ATORVASTATIN CALCIUM 40 MG/1
40 TABLET, FILM COATED ORAL DAILY
Status: ON HOLD | COMMUNITY
End: 2021-05-09 | Stop reason: SDUPTHER

## 2021-05-07 RX ORDER — CLOPIDOGREL BISULFATE 75 MG/1
75 TABLET ORAL DAILY
Status: DISCONTINUED | OUTPATIENT
Start: 2021-05-08 | End: 2021-05-18 | Stop reason: HOSPADM

## 2021-05-07 RX ORDER — IBUPROFEN 200 MG
16 TABLET ORAL
Status: DISCONTINUED | OUTPATIENT
Start: 2021-05-07 | End: 2021-05-18 | Stop reason: HOSPADM

## 2021-05-07 RX ORDER — ASPIRIN 81 MG/1
81 TABLET ORAL DAILY
Status: DISCONTINUED | OUTPATIENT
Start: 2021-05-08 | End: 2021-05-18 | Stop reason: HOSPADM

## 2021-05-07 RX ORDER — ATORVASTATIN CALCIUM 20 MG/1
40 TABLET, FILM COATED ORAL DAILY
Status: DISCONTINUED | OUTPATIENT
Start: 2021-05-08 | End: 2021-05-18 | Stop reason: HOSPADM

## 2021-05-07 RX ORDER — CLOPIDOGREL BISULFATE 75 MG/1
75 TABLET ORAL
Status: ON HOLD | COMMUNITY
End: 2021-05-09 | Stop reason: SDUPTHER

## 2021-05-07 RX ORDER — SODIUM CHLORIDE 0.9 % (FLUSH) 0.9 %
5 SYRINGE (ML) INJECTION
Status: DISCONTINUED | OUTPATIENT
Start: 2021-05-07 | End: 2021-05-18 | Stop reason: HOSPADM

## 2021-05-07 RX ORDER — AMOXICILLIN 250 MG
1 CAPSULE ORAL 2 TIMES DAILY
Status: DISCONTINUED | OUTPATIENT
Start: 2021-05-07 | End: 2021-05-08

## 2021-05-07 RX ORDER — INSULIN ASPART 100 [IU]/ML
3 INJECTION, SOLUTION INTRAVENOUS; SUBCUTANEOUS
Status: DISCONTINUED | OUTPATIENT
Start: 2021-05-08 | End: 2021-05-08

## 2021-05-07 RX ORDER — IBUPROFEN 200 MG
24 TABLET ORAL
Status: DISCONTINUED | OUTPATIENT
Start: 2021-05-07 | End: 2021-05-18 | Stop reason: HOSPADM

## 2021-05-07 RX ORDER — ACETAMINOPHEN 325 MG/1
650 TABLET ORAL EVERY 4 HOURS PRN
Status: DISCONTINUED | OUTPATIENT
Start: 2021-05-07 | End: 2021-05-18 | Stop reason: HOSPADM

## 2021-05-07 RX ORDER — HYDRALAZINE HYDROCHLORIDE 50 MG/1
50 TABLET, FILM COATED ORAL EVERY 6 HOURS PRN
Status: DISCONTINUED | OUTPATIENT
Start: 2021-05-07 | End: 2021-05-18 | Stop reason: HOSPADM

## 2021-05-07 RX ORDER — METOPROLOL TARTRATE 25 MG/1
25 TABLET, FILM COATED ORAL DAILY
Status: DISCONTINUED | OUTPATIENT
Start: 2021-05-08 | End: 2021-05-18 | Stop reason: HOSPADM

## 2021-05-07 RX ORDER — SODIUM CHLORIDE 0.9 % (FLUSH) 0.9 %
10 SYRINGE (ML) INJECTION
Status: DISCONTINUED | OUTPATIENT
Start: 2021-05-07 | End: 2021-05-18 | Stop reason: HOSPADM

## 2021-05-07 RX ORDER — INSULIN ASPART 100 [IU]/ML
0-5 INJECTION, SOLUTION INTRAVENOUS; SUBCUTANEOUS
Status: DISCONTINUED | OUTPATIENT
Start: 2021-05-07 | End: 2021-05-18 | Stop reason: HOSPADM

## 2021-05-07 RX ORDER — IPRATROPIUM BROMIDE AND ALBUTEROL SULFATE 2.5; .5 MG/3ML; MG/3ML
3 SOLUTION RESPIRATORY (INHALATION) EVERY 4 HOURS PRN
Status: DISCONTINUED | OUTPATIENT
Start: 2021-05-07 | End: 2021-05-18 | Stop reason: HOSPADM

## 2021-05-07 RX ORDER — TALC
6 POWDER (GRAM) TOPICAL NIGHTLY PRN
Status: DISCONTINUED | OUTPATIENT
Start: 2021-05-07 | End: 2021-05-18 | Stop reason: HOSPADM

## 2021-05-07 RX ORDER — METOPROLOL TARTRATE 25 MG/1
25 TABLET, FILM COATED ORAL DAILY
Status: ON HOLD | COMMUNITY
Start: 2021-04-29 | End: 2021-05-09 | Stop reason: SDUPTHER

## 2021-05-07 RX ORDER — AMLODIPINE BESYLATE 5 MG/1
5 TABLET ORAL DAILY
Status: DISCONTINUED | OUTPATIENT
Start: 2021-05-08 | End: 2021-05-18 | Stop reason: HOSPADM

## 2021-05-07 RX ORDER — ASPIRIN 81 MG/1
81 TABLET ORAL
Status: ON HOLD | COMMUNITY
End: 2021-05-09 | Stop reason: SDUPTHER

## 2021-05-07 RX ORDER — AMLODIPINE BESYLATE 5 MG/1
5 TABLET ORAL DAILY
Status: ON HOLD | COMMUNITY
End: 2021-05-09 | Stop reason: SDUPTHER

## 2021-05-07 RX ORDER — INSULIN LISPRO 100 [IU]/ML
20 INJECTION, SOLUTION INTRAVENOUS; SUBCUTANEOUS
Status: ON HOLD | COMMUNITY
End: 2021-05-10 | Stop reason: CLARIF

## 2021-05-07 RX ORDER — PROMETHAZINE HYDROCHLORIDE 12.5 MG/1
25 TABLET ORAL EVERY 6 HOURS PRN
Status: DISCONTINUED | OUTPATIENT
Start: 2021-05-07 | End: 2021-05-18 | Stop reason: HOSPADM

## 2021-05-07 RX ADMIN — INSULIN DETEMIR 6 UNITS: 100 INJECTION, SOLUTION SUBCUTANEOUS at 10:05

## 2021-05-08 PROBLEM — E87.70 VOLUME OVERLOAD: Status: ACTIVE | Noted: 2021-05-08

## 2021-05-08 LAB
ALBUMIN SERPL BCP-MCNC: 2.5 G/DL (ref 3.5–5.2)
ALP SERPL-CCNC: 76 U/L (ref 55–135)
ALT SERPL W/O P-5'-P-CCNC: <5 U/L (ref 10–44)
ANION GAP SERPL CALC-SCNC: 16 MMOL/L (ref 8–16)
AST SERPL-CCNC: 7 U/L (ref 10–40)
BASOPHILS # BLD AUTO: 0.02 K/UL (ref 0–0.2)
BASOPHILS NFR BLD: 0.4 % (ref 0–1.9)
BILIRUB SERPL-MCNC: 0.4 MG/DL (ref 0.1–1)
BUN SERPL-MCNC: 68 MG/DL (ref 8–23)
CALCIUM SERPL-MCNC: 7.8 MG/DL (ref 8.7–10.5)
CHLORIDE SERPL-SCNC: 102 MMOL/L (ref 95–110)
CO2 SERPL-SCNC: 24 MMOL/L (ref 23–29)
CREAT SERPL-MCNC: 12.6 MG/DL (ref 0.5–1.4)
DIFFERENTIAL METHOD: ABNORMAL
EOSINOPHIL # BLD AUTO: 0.3 K/UL (ref 0–0.5)
EOSINOPHIL NFR BLD: 6.2 % (ref 0–8)
ERYTHROCYTE [DISTWIDTH] IN BLOOD BY AUTOMATED COUNT: 14.6 % (ref 11.5–14.5)
EST. GFR  (AFRICAN AMERICAN): 2.9 ML/MIN/1.73 M^2
EST. GFR  (NON AFRICAN AMERICAN): 2.5 ML/MIN/1.73 M^2
ESTIMATED AVG GLUCOSE: 160 MG/DL (ref 68–131)
GLUCOSE SERPL-MCNC: 101 MG/DL (ref 70–110)
HBA1C MFR BLD: 7.2 % (ref 4–5.6)
HCT VFR BLD AUTO: 28.9 % (ref 37–48.5)
HGB BLD-MCNC: 9.3 G/DL (ref 12–16)
IMM GRANULOCYTES # BLD AUTO: 0.02 K/UL (ref 0–0.04)
IMM GRANULOCYTES NFR BLD AUTO: 0.4 % (ref 0–0.5)
LYMPHOCYTES # BLD AUTO: 0.9 K/UL (ref 1–4.8)
LYMPHOCYTES NFR BLD: 19.3 % (ref 18–48)
MAGNESIUM SERPL-MCNC: 2 MG/DL (ref 1.6–2.6)
MCH RBC QN AUTO: 30 PG (ref 27–31)
MCHC RBC AUTO-ENTMCNC: 32.2 G/DL (ref 32–36)
MCV RBC AUTO: 93 FL (ref 82–98)
MONOCYTES # BLD AUTO: 0.5 K/UL (ref 0.3–1)
MONOCYTES NFR BLD: 9.9 % (ref 4–15)
NEUTROPHILS # BLD AUTO: 3 K/UL (ref 1.8–7.7)
NEUTROPHILS NFR BLD: 63.8 % (ref 38–73)
NRBC BLD-RTO: 0 /100 WBC
PHOSPHATE SERPL-MCNC: 7.5 MG/DL (ref 2.7–4.5)
PLATELET # BLD AUTO: 103 K/UL (ref 150–450)
PMV BLD AUTO: 13.9 FL (ref 9.2–12.9)
POCT GLUCOSE: 160 MG/DL (ref 70–110)
POCT GLUCOSE: 63 MG/DL (ref 70–110)
POCT GLUCOSE: 71 MG/DL (ref 70–110)
POCT GLUCOSE: 81 MG/DL (ref 70–110)
POTASSIUM SERPL-SCNC: 4.7 MMOL/L (ref 3.5–5.1)
PROT SERPL-MCNC: 5.8 G/DL (ref 6–8.4)
RBC # BLD AUTO: 3.1 M/UL (ref 4–5.4)
SODIUM SERPL-SCNC: 142 MMOL/L (ref 136–145)
WBC # BLD AUTO: 4.66 K/UL (ref 3.9–12.7)

## 2021-05-08 PROCEDURE — 83735 ASSAY OF MAGNESIUM: CPT | Performed by: PHYSICIAN ASSISTANT

## 2021-05-08 PROCEDURE — 25000003 PHARM REV CODE 250: Performed by: PHYSICIAN ASSISTANT

## 2021-05-08 PROCEDURE — 99226 PR SUBSEQUENT OBSERVATION CARE,LEVEL III: CPT | Mod: ,,, | Performed by: PHYSICIAN ASSISTANT

## 2021-05-08 PROCEDURE — 80053 COMPREHEN METABOLIC PANEL: CPT | Performed by: PHYSICIAN ASSISTANT

## 2021-05-08 PROCEDURE — G0378 HOSPITAL OBSERVATION PER HR: HCPCS

## 2021-05-08 PROCEDURE — 80100014 HC HEMODIALYSIS 1:1

## 2021-05-08 PROCEDURE — C9399 UNCLASSIFIED DRUGS OR BIOLOG: HCPCS | Performed by: PHYSICIAN ASSISTANT

## 2021-05-08 PROCEDURE — 83036 HEMOGLOBIN GLYCOSYLATED A1C: CPT | Performed by: PHYSICIAN ASSISTANT

## 2021-05-08 PROCEDURE — 36415 COLL VENOUS BLD VENIPUNCTURE: CPT | Performed by: PHYSICIAN ASSISTANT

## 2021-05-08 PROCEDURE — 84100 ASSAY OF PHOSPHORUS: CPT | Performed by: PHYSICIAN ASSISTANT

## 2021-05-08 PROCEDURE — 99226 PR SUBSEQUENT OBSERVATION CARE,LEVEL III: ICD-10-PCS | Mod: ,,, | Performed by: PHYSICIAN ASSISTANT

## 2021-05-08 PROCEDURE — 96372 THER/PROPH/DIAG INJ SC/IM: CPT | Mod: 59 | Performed by: EMERGENCY MEDICINE

## 2021-05-08 PROCEDURE — 85025 COMPLETE CBC W/AUTO DIFF WBC: CPT | Performed by: PHYSICIAN ASSISTANT

## 2021-05-08 PROCEDURE — 90935 PR HEMODIALYSIS, ONE EVALUATION: ICD-10-PCS | Mod: ,,, | Performed by: NURSE PRACTITIONER

## 2021-05-08 PROCEDURE — 90935 HEMODIALYSIS ONE EVALUATION: CPT | Mod: ,,, | Performed by: NURSE PRACTITIONER

## 2021-05-08 PROCEDURE — 63600175 PHARM REV CODE 636 W HCPCS: Performed by: PHYSICIAN ASSISTANT

## 2021-05-08 RX ORDER — SEVELAMER CARBONATE 800 MG/1
800 TABLET, FILM COATED ORAL
Status: DISCONTINUED | OUTPATIENT
Start: 2021-05-08 | End: 2021-05-18 | Stop reason: HOSPADM

## 2021-05-08 RX ORDER — SODIUM CHLORIDE 9 MG/ML
INJECTION, SOLUTION INTRAVENOUS ONCE
Status: DISCONTINUED | OUTPATIENT
Start: 2021-05-08 | End: 2021-05-09

## 2021-05-08 RX ADMIN — ATORVASTATIN CALCIUM 40 MG: 20 TABLET, FILM COATED ORAL at 08:05

## 2021-05-08 RX ADMIN — CLOPIDOGREL 75 MG: 75 TABLET, FILM COATED ORAL at 08:05

## 2021-05-08 RX ADMIN — METOPROLOL TARTRATE 25 MG: 25 TABLET, FILM COATED ORAL at 08:05

## 2021-05-08 RX ADMIN — DOCUSATE SODIUM 50MG AND SENNOSIDES 8.6MG 1 TABLET: 8.6; 5 TABLET, FILM COATED ORAL at 08:05

## 2021-05-08 RX ADMIN — INSULIN DETEMIR 6 UNITS: 100 INJECTION, SOLUTION SUBCUTANEOUS at 08:05

## 2021-05-08 RX ADMIN — INSULIN DETEMIR 3 UNITS: 100 INJECTION, SOLUTION SUBCUTANEOUS at 08:05

## 2021-05-08 RX ADMIN — ASPIRIN 81 MG: 81 TABLET, COATED ORAL at 08:05

## 2021-05-08 RX ADMIN — SEVELAMER CARBONATE 800 MG: 800 TABLET, FILM COATED ORAL at 05:05

## 2021-05-08 RX ADMIN — AMLODIPINE BESYLATE 5 MG: 5 TABLET ORAL at 08:05

## 2021-05-08 RX ADMIN — INSULIN ASPART 3 UNITS: 100 INJECTION, SOLUTION INTRAVENOUS; SUBCUTANEOUS at 08:05

## 2021-05-09 LAB
ALBUMIN SERPL BCP-MCNC: 2.6 G/DL (ref 3.5–5.2)
ALP SERPL-CCNC: 82 U/L (ref 55–135)
ALT SERPL W/O P-5'-P-CCNC: <5 U/L (ref 10–44)
ANION GAP SERPL CALC-SCNC: 12 MMOL/L (ref 8–16)
AST SERPL-CCNC: 9 U/L (ref 10–40)
BASOPHILS # BLD AUTO: 0.04 K/UL (ref 0–0.2)
BASOPHILS NFR BLD: 0.8 % (ref 0–1.9)
BILIRUB SERPL-MCNC: 0.4 MG/DL (ref 0.1–1)
BUN SERPL-MCNC: 44 MG/DL (ref 8–23)
CALCIUM SERPL-MCNC: 8.7 MG/DL (ref 8.7–10.5)
CHLORIDE SERPL-SCNC: 104 MMOL/L (ref 95–110)
CO2 SERPL-SCNC: 23 MMOL/L (ref 23–29)
CREAT SERPL-MCNC: 9.2 MG/DL (ref 0.5–1.4)
DIFFERENTIAL METHOD: ABNORMAL
EOSINOPHIL # BLD AUTO: 0.3 K/UL (ref 0–0.5)
EOSINOPHIL NFR BLD: 6.4 % (ref 0–8)
ERYTHROCYTE [DISTWIDTH] IN BLOOD BY AUTOMATED COUNT: 14.5 % (ref 11.5–14.5)
EST. GFR  (AFRICAN AMERICAN): 4.3 ML/MIN/1.73 M^2
EST. GFR  (NON AFRICAN AMERICAN): 3.7 ML/MIN/1.73 M^2
FOLATE SERPL-MCNC: 4.8 NG/ML (ref 4–24)
GLUCOSE SERPL-MCNC: 80 MG/DL (ref 70–110)
HCT VFR BLD AUTO: 35.1 % (ref 37–48.5)
HGB BLD-MCNC: 11.1 G/DL (ref 12–16)
IMM GRANULOCYTES # BLD AUTO: 0.02 K/UL (ref 0–0.04)
IMM GRANULOCYTES NFR BLD AUTO: 0.4 % (ref 0–0.5)
IRON SERPL-MCNC: 44 UG/DL (ref 30–160)
LYMPHOCYTES # BLD AUTO: 1.1 K/UL (ref 1–4.8)
LYMPHOCYTES NFR BLD: 23.7 % (ref 18–48)
MAGNESIUM SERPL-MCNC: 2.1 MG/DL (ref 1.6–2.6)
MCH RBC QN AUTO: 30.3 PG (ref 27–31)
MCHC RBC AUTO-ENTMCNC: 31.6 G/DL (ref 32–36)
MCV RBC AUTO: 96 FL (ref 82–98)
MONOCYTES # BLD AUTO: 0.6 K/UL (ref 0.3–1)
MONOCYTES NFR BLD: 12.5 % (ref 4–15)
NEUTROPHILS # BLD AUTO: 2.7 K/UL (ref 1.8–7.7)
NEUTROPHILS NFR BLD: 56.2 % (ref 38–73)
NRBC BLD-RTO: 0 /100 WBC
PLATELET # BLD AUTO: 100 K/UL (ref 150–450)
PMV BLD AUTO: 13.9 FL (ref 9.2–12.9)
POCT GLUCOSE: 106 MG/DL (ref 70–110)
POCT GLUCOSE: 114 MG/DL (ref 70–110)
POCT GLUCOSE: 133 MG/DL (ref 70–110)
POCT GLUCOSE: 152 MG/DL (ref 70–110)
POCT GLUCOSE: 184 MG/DL (ref 70–110)
POCT GLUCOSE: 186 MG/DL (ref 70–110)
POCT GLUCOSE: 64 MG/DL (ref 70–110)
POTASSIUM SERPL-SCNC: 4.9 MMOL/L (ref 3.5–5.1)
PROT SERPL-MCNC: 6.4 G/DL (ref 6–8.4)
RBC # BLD AUTO: 3.66 M/UL (ref 4–5.4)
SATURATED IRON: 24 % (ref 20–50)
SODIUM SERPL-SCNC: 139 MMOL/L (ref 136–145)
TOTAL IRON BINDING CAPACITY: 182 UG/DL (ref 250–450)
TRANSFERRIN SERPL-MCNC: 123 MG/DL (ref 200–375)
VIT B12 SERPL-MCNC: 222 PG/ML (ref 210–950)
WBC # BLD AUTO: 4.81 K/UL (ref 3.9–12.7)

## 2021-05-09 PROCEDURE — 63600175 PHARM REV CODE 636 W HCPCS: Performed by: INTERNAL MEDICINE

## 2021-05-09 PROCEDURE — 25000003 PHARM REV CODE 250: Performed by: PHYSICIAN ASSISTANT

## 2021-05-09 PROCEDURE — 36415 COLL VENOUS BLD VENIPUNCTURE: CPT | Performed by: INTERNAL MEDICINE

## 2021-05-09 PROCEDURE — G0378 HOSPITAL OBSERVATION PER HR: HCPCS

## 2021-05-09 PROCEDURE — 80053 COMPREHEN METABOLIC PANEL: CPT | Performed by: PHYSICIAN ASSISTANT

## 2021-05-09 PROCEDURE — 94761 N-INVAS EAR/PLS OXIMETRY MLT: CPT

## 2021-05-09 PROCEDURE — 82607 VITAMIN B-12: CPT | Performed by: PHYSICIAN ASSISTANT

## 2021-05-09 PROCEDURE — 36415 COLL VENOUS BLD VENIPUNCTURE: CPT | Performed by: PHYSICIAN ASSISTANT

## 2021-05-09 PROCEDURE — 99226 PR SUBSEQUENT OBSERVATION CARE,LEVEL III: ICD-10-PCS | Mod: ,,, | Performed by: PHYSICIAN ASSISTANT

## 2021-05-09 PROCEDURE — 82746 ASSAY OF FOLIC ACID SERUM: CPT | Performed by: PHYSICIAN ASSISTANT

## 2021-05-09 PROCEDURE — 99226 PR SUBSEQUENT OBSERVATION CARE,LEVEL III: CPT | Mod: ,,, | Performed by: PHYSICIAN ASSISTANT

## 2021-05-09 PROCEDURE — 85025 COMPLETE CBC W/AUTO DIFF WBC: CPT | Performed by: INTERNAL MEDICINE

## 2021-05-09 PROCEDURE — 96372 THER/PROPH/DIAG INJ SC/IM: CPT | Mod: 59 | Performed by: EMERGENCY MEDICINE

## 2021-05-09 PROCEDURE — 83540 ASSAY OF IRON: CPT | Performed by: PHYSICIAN ASSISTANT

## 2021-05-09 PROCEDURE — 83735 ASSAY OF MAGNESIUM: CPT | Performed by: PHYSICIAN ASSISTANT

## 2021-05-09 RX ORDER — ASPIRIN 81 MG/1
81 TABLET ORAL DAILY
Qty: 30 TABLET | Refills: 3 | Status: SHIPPED | OUTPATIENT
Start: 2021-05-09

## 2021-05-09 RX ORDER — CLOPIDOGREL BISULFATE 75 MG/1
75 TABLET ORAL DAILY
Qty: 90 TABLET | Refills: 3 | Status: SHIPPED | OUTPATIENT
Start: 2021-05-09

## 2021-05-09 RX ORDER — CYANOCOBALAMIN 1000 UG/ML
1000 INJECTION, SOLUTION INTRAMUSCULAR; SUBCUTANEOUS
Status: DISCONTINUED | OUTPATIENT
Start: 2021-05-16 | End: 2021-05-18 | Stop reason: HOSPADM

## 2021-05-09 RX ORDER — AMLODIPINE BESYLATE 5 MG/1
5 TABLET ORAL DAILY
Qty: 90 TABLET | Refills: 3 | Status: ON HOLD | OUTPATIENT
Start: 2021-05-09 | End: 2021-01-01 | Stop reason: HOSPADM

## 2021-05-09 RX ORDER — CYANOCOBALAMIN (VITAMIN B-12) 250 MCG
250 TABLET ORAL DAILY
Qty: 90 TABLET | Refills: 3 | Status: SHIPPED | OUTPATIENT
Start: 2021-05-09

## 2021-05-09 RX ORDER — ATORVASTATIN CALCIUM 40 MG/1
40 TABLET, FILM COATED ORAL DAILY
Qty: 30 TABLET | Refills: 3 | Status: SHIPPED | OUTPATIENT
Start: 2021-05-09

## 2021-05-09 RX ORDER — CALCIUM ACETATE 667 MG/1
667 TABLET ORAL
Qty: 90 TABLET | Refills: 3 | Status: SHIPPED | OUTPATIENT
Start: 2021-05-09 | End: 2022-01-01

## 2021-05-09 RX ORDER — CYANOCOBALAMIN 1000 UG/ML
1000 INJECTION, SOLUTION INTRAMUSCULAR; SUBCUTANEOUS DAILY
Status: DISPENSED | OUTPATIENT
Start: 2021-05-09 | End: 2021-05-16

## 2021-05-09 RX ORDER — METOPROLOL TARTRATE 25 MG/1
25 TABLET, FILM COATED ORAL DAILY
Qty: 90 TABLET | Refills: 3 | Status: SHIPPED | OUTPATIENT
Start: 2021-05-09 | End: 2022-01-01

## 2021-05-09 RX ADMIN — SEVELAMER CARBONATE 800 MG: 800 TABLET, FILM COATED ORAL at 08:05

## 2021-05-09 RX ADMIN — AMLODIPINE BESYLATE 5 MG: 5 TABLET ORAL at 08:05

## 2021-05-09 RX ADMIN — MELATONIN TAB 3 MG 6 MG: 3 TAB at 08:05

## 2021-05-09 RX ADMIN — CYANOCOBALAMIN 1000 MCG: 1000 INJECTION, SOLUTION INTRAMUSCULAR; SUBCUTANEOUS at 02:05

## 2021-05-09 RX ADMIN — ASPIRIN 81 MG: 81 TABLET, COATED ORAL at 08:05

## 2021-05-09 RX ADMIN — SEVELAMER CARBONATE 800 MG: 800 TABLET, FILM COATED ORAL at 05:05

## 2021-05-09 RX ADMIN — SEVELAMER CARBONATE 800 MG: 800 TABLET, FILM COATED ORAL at 12:05

## 2021-05-09 RX ADMIN — CLOPIDOGREL 75 MG: 75 TABLET, FILM COATED ORAL at 08:05

## 2021-05-09 RX ADMIN — METOPROLOL TARTRATE 25 MG: 25 TABLET, FILM COATED ORAL at 08:05

## 2021-05-09 RX ADMIN — ATORVASTATIN CALCIUM 40 MG: 20 TABLET, FILM COATED ORAL at 08:05

## 2021-05-10 PROBLEM — E53.8 B12 DEFICIENCY: Status: ACTIVE | Noted: 2021-05-10

## 2021-05-10 LAB
ALBUMIN SERPL BCP-MCNC: 2.6 G/DL (ref 3.5–5.2)
ALP SERPL-CCNC: 84 U/L (ref 55–135)
ALT SERPL W/O P-5'-P-CCNC: <5 U/L (ref 10–44)
ANION GAP SERPL CALC-SCNC: 14 MMOL/L (ref 8–16)
AST SERPL-CCNC: 8 U/L (ref 10–40)
BASOPHILS # BLD AUTO: 0.05 K/UL (ref 0–0.2)
BASOPHILS NFR BLD: 1 % (ref 0–1.9)
BILIRUB SERPL-MCNC: 0.3 MG/DL (ref 0.1–1)
BUN SERPL-MCNC: 57 MG/DL (ref 6–30)
BUN SERPL-MCNC: 59 MG/DL (ref 8–23)
CALCIUM SERPL-MCNC: 8.1 MG/DL (ref 8.7–10.5)
CHLORIDE SERPL-SCNC: 100 MMOL/L (ref 95–110)
CHLORIDE SERPL-SCNC: 104 MMOL/L (ref 95–110)
CO2 SERPL-SCNC: 20 MMOL/L (ref 23–29)
CREAT SERPL-MCNC: 11 MG/DL (ref 0.5–1.4)
CREAT SERPL-MCNC: 14.7 MG/DL (ref 0.5–1.4)
DIFFERENTIAL METHOD: ABNORMAL
EOSINOPHIL # BLD AUTO: 0.3 K/UL (ref 0–0.5)
EOSINOPHIL NFR BLD: 6.5 % (ref 0–8)
ERYTHROCYTE [DISTWIDTH] IN BLOOD BY AUTOMATED COUNT: 14.5 % (ref 11.5–14.5)
EST. GFR  (AFRICAN AMERICAN): 3.5 ML/MIN/1.73 M^2
EST. GFR  (NON AFRICAN AMERICAN): 3 ML/MIN/1.73 M^2
GLUCOSE SERPL-MCNC: 102 MG/DL (ref 70–110)
GLUCOSE SERPL-MCNC: 153 MG/DL (ref 70–110)
HCT VFR BLD AUTO: 33.7 % (ref 37–48.5)
HCT VFR BLD CALC: 34 %PCV (ref 36–54)
HGB BLD-MCNC: 10.8 G/DL (ref 12–16)
IMM GRANULOCYTES # BLD AUTO: 0.03 K/UL (ref 0–0.04)
IMM GRANULOCYTES NFR BLD AUTO: 0.6 % (ref 0–0.5)
LYMPHOCYTES # BLD AUTO: 1.3 K/UL (ref 1–4.8)
LYMPHOCYTES NFR BLD: 25.1 % (ref 18–48)
MAGNESIUM SERPL-MCNC: 2.2 MG/DL (ref 1.6–2.6)
MCH RBC QN AUTO: 29.3 PG (ref 27–31)
MCHC RBC AUTO-ENTMCNC: 32 G/DL (ref 32–36)
MCV RBC AUTO: 92 FL (ref 82–98)
MONOCYTES # BLD AUTO: 0.7 K/UL (ref 0.3–1)
MONOCYTES NFR BLD: 12.8 % (ref 4–15)
NEUTROPHILS # BLD AUTO: 2.7 K/UL (ref 1.8–7.7)
NEUTROPHILS NFR BLD: 54 % (ref 38–73)
NRBC BLD-RTO: 0 /100 WBC
PLATELET # BLD AUTO: 96 K/UL (ref 150–450)
PMV BLD AUTO: 13.9 FL (ref 9.2–12.9)
POC IONIZED CALCIUM: 0.84 MMOL/L (ref 1.06–1.42)
POC TCO2 (MEASURED): ABNORMAL MMOL/L
POCT GLUCOSE: 121 MG/DL (ref 70–110)
POCT GLUCOSE: 149 MG/DL (ref 70–110)
POTASSIUM BLD-SCNC: 4.6 MMOL/L (ref 3.5–5.1)
POTASSIUM SERPL-SCNC: 5.1 MMOL/L (ref 3.5–5.1)
PROT SERPL-MCNC: 6.4 G/DL (ref 6–8.4)
RBC # BLD AUTO: 3.68 M/UL (ref 4–5.4)
SAMPLE: ABNORMAL
SODIUM BLD-SCNC: 138 MMOL/L (ref 136–145)
SODIUM SERPL-SCNC: 134 MMOL/L (ref 136–145)
WBC # BLD AUTO: 5.06 K/UL (ref 3.9–12.7)

## 2021-05-10 PROCEDURE — 25000003 PHARM REV CODE 250: Performed by: PHYSICIAN ASSISTANT

## 2021-05-10 PROCEDURE — 97110 THERAPEUTIC EXERCISES: CPT

## 2021-05-10 PROCEDURE — 97165 OT EVAL LOW COMPLEX 30 MIN: CPT

## 2021-05-10 PROCEDURE — 97535 SELF CARE MNGMENT TRAINING: CPT

## 2021-05-10 PROCEDURE — 63600175 PHARM REV CODE 636 W HCPCS: Performed by: INTERNAL MEDICINE

## 2021-05-10 PROCEDURE — 96372 THER/PROPH/DIAG INJ SC/IM: CPT | Mod: 59 | Performed by: EMERGENCY MEDICINE

## 2021-05-10 PROCEDURE — 99224 PR SUBSEQUENT OBSERVATION CARE,LEVEL I: ICD-10-PCS | Mod: ,,, | Performed by: PHYSICIAN ASSISTANT

## 2021-05-10 PROCEDURE — 99224 PR SUBSEQUENT OBSERVATION CARE,LEVEL I: CPT | Mod: ,,, | Performed by: PHYSICIAN ASSISTANT

## 2021-05-10 PROCEDURE — 83735 ASSAY OF MAGNESIUM: CPT | Performed by: PHYSICIAN ASSISTANT

## 2021-05-10 PROCEDURE — G0378 HOSPITAL OBSERVATION PER HR: HCPCS

## 2021-05-10 PROCEDURE — 97161 PT EVAL LOW COMPLEX 20 MIN: CPT

## 2021-05-10 PROCEDURE — 80053 COMPREHEN METABOLIC PANEL: CPT | Performed by: PHYSICIAN ASSISTANT

## 2021-05-10 PROCEDURE — 36415 COLL VENOUS BLD VENIPUNCTURE: CPT | Performed by: PHYSICIAN ASSISTANT

## 2021-05-10 PROCEDURE — 85025 COMPLETE CBC W/AUTO DIFF WBC: CPT | Performed by: PHYSICIAN ASSISTANT

## 2021-05-10 RX ORDER — HEPARIN SODIUM 1000 [USP'U]/ML
1000 INJECTION, SOLUTION INTRAVENOUS; SUBCUTANEOUS
Status: DISCONTINUED | OUTPATIENT
Start: 2021-05-11 | End: 2021-05-13

## 2021-05-10 RX ORDER — SODIUM CHLORIDE 9 MG/ML
INJECTION, SOLUTION INTRAVENOUS ONCE
Status: DISCONTINUED | OUTPATIENT
Start: 2021-05-11 | End: 2021-05-18 | Stop reason: HOSPADM

## 2021-05-10 RX ORDER — MUPIROCIN 20 MG/G
OINTMENT TOPICAL 2 TIMES DAILY
Status: DISPENSED | OUTPATIENT
Start: 2021-05-11 | End: 2021-05-16

## 2021-05-10 RX ADMIN — CYANOCOBALAMIN 1000 MCG: 1000 INJECTION, SOLUTION INTRAMUSCULAR; SUBCUTANEOUS at 11:05

## 2021-05-10 RX ADMIN — ACETAMINOPHEN 650 MG: 325 TABLET ORAL at 09:05

## 2021-05-10 RX ADMIN — SEVELAMER CARBONATE 800 MG: 800 TABLET, FILM COATED ORAL at 09:05

## 2021-05-10 RX ADMIN — ASPIRIN 81 MG: 81 TABLET, COATED ORAL at 09:05

## 2021-05-10 RX ADMIN — ATORVASTATIN CALCIUM 40 MG: 20 TABLET, FILM COATED ORAL at 09:05

## 2021-05-10 RX ADMIN — ONDANSETRON 8 MG: 8 TABLET, ORALLY DISINTEGRATING ORAL at 09:05

## 2021-05-10 RX ADMIN — MELATONIN TAB 3 MG 6 MG: 3 TAB at 09:05

## 2021-05-10 RX ADMIN — CLOPIDOGREL 75 MG: 75 TABLET, FILM COATED ORAL at 09:05

## 2021-05-10 RX ADMIN — ACETAMINOPHEN 650 MG: 325 TABLET ORAL at 12:05

## 2021-05-10 RX ADMIN — AMLODIPINE BESYLATE 5 MG: 5 TABLET ORAL at 09:05

## 2021-05-10 RX ADMIN — SEVELAMER CARBONATE 800 MG: 800 TABLET, FILM COATED ORAL at 12:05

## 2021-05-10 RX ADMIN — METOPROLOL TARTRATE 25 MG: 25 TABLET, FILM COATED ORAL at 09:05

## 2021-05-10 RX ADMIN — SEVELAMER CARBONATE 800 MG: 800 TABLET, FILM COATED ORAL at 05:05

## 2021-05-11 PROBLEM — N30.01 ACUTE CYSTITIS WITH HEMATURIA: Status: ACTIVE | Noted: 2021-05-11

## 2021-05-11 LAB
ALBUMIN SERPL BCP-MCNC: 2.8 G/DL (ref 3.5–5.2)
ALP SERPL-CCNC: 83 U/L (ref 55–135)
ALT SERPL W/O P-5'-P-CCNC: <5 U/L (ref 10–44)
ANION GAP SERPL CALC-SCNC: 13 MMOL/L (ref 8–16)
AST SERPL-CCNC: 11 U/L (ref 10–40)
BACTERIA #/AREA URNS AUTO: ABNORMAL /HPF
BASOPHILS # BLD AUTO: 0.04 K/UL (ref 0–0.2)
BASOPHILS NFR BLD: 0.9 % (ref 0–1.9)
BILIRUB SERPL-MCNC: 0.4 MG/DL (ref 0.1–1)
BILIRUB UR QL STRIP: NEGATIVE
BILIRUB UR QL STRIP: NEGATIVE
BUN SERPL-MCNC: 33 MG/DL (ref 8–23)
CALCIUM SERPL-MCNC: 8.6 MG/DL (ref 8.7–10.5)
CHLORIDE SERPL-SCNC: 101 MMOL/L (ref 95–110)
CLARITY UR REFRACT.AUTO: ABNORMAL
CLARITY UR REFRACT.AUTO: ABNORMAL
CO2 SERPL-SCNC: 20 MMOL/L (ref 23–29)
COLOR UR AUTO: YELLOW
COLOR UR AUTO: YELLOW
CREAT SERPL-MCNC: 6.5 MG/DL (ref 0.5–1.4)
DIFFERENTIAL METHOD: ABNORMAL
EOSINOPHIL # BLD AUTO: 0.2 K/UL (ref 0–0.5)
EOSINOPHIL NFR BLD: 4.6 % (ref 0–8)
ERYTHROCYTE [DISTWIDTH] IN BLOOD BY AUTOMATED COUNT: 14.4 % (ref 11.5–14.5)
EST. GFR  (AFRICAN AMERICAN): 6.5 ML/MIN/1.73 M^2
EST. GFR  (NON AFRICAN AMERICAN): 5.7 ML/MIN/1.73 M^2
GLUCOSE SERPL-MCNC: 100 MG/DL (ref 70–110)
GLUCOSE SERPL-MCNC: 99 MG/DL (ref 70–110)
GLUCOSE UR QL STRIP: NEGATIVE
GLUCOSE UR QL STRIP: NEGATIVE
HCT VFR BLD AUTO: 32.8 % (ref 37–48.5)
HGB BLD-MCNC: 10.8 G/DL (ref 12–16)
HGB UR QL STRIP: ABNORMAL
HGB UR QL STRIP: ABNORMAL
HYALINE CASTS UR QL AUTO: 0 /LPF
IMM GRANULOCYTES # BLD AUTO: 0.04 K/UL (ref 0–0.04)
IMM GRANULOCYTES NFR BLD AUTO: 0.9 % (ref 0–0.5)
KETONES UR QL STRIP: ABNORMAL
KETONES UR QL STRIP: ABNORMAL
LEUKOCYTE ESTERASE UR QL STRIP: ABNORMAL
LEUKOCYTE ESTERASE UR QL STRIP: ABNORMAL
LYMPHOCYTES # BLD AUTO: 1 K/UL (ref 1–4.8)
LYMPHOCYTES NFR BLD: 21.8 % (ref 18–48)
MAGNESIUM SERPL-MCNC: 2 MG/DL (ref 1.6–2.6)
MCH RBC QN AUTO: 29.7 PG (ref 27–31)
MCHC RBC AUTO-ENTMCNC: 32.9 G/DL (ref 32–36)
MCV RBC AUTO: 90 FL (ref 82–98)
MICROSCOPIC COMMENT: ABNORMAL
MONOCYTES # BLD AUTO: 0.6 K/UL (ref 0.3–1)
MONOCYTES NFR BLD: 12 % (ref 4–15)
NEUTROPHILS # BLD AUTO: 2.7 K/UL (ref 1.8–7.7)
NEUTROPHILS NFR BLD: 59.8 % (ref 38–73)
NITRITE UR QL STRIP: NEGATIVE
NITRITE UR QL STRIP: NEGATIVE
NRBC BLD-RTO: 0 /100 WBC
PH UR STRIP: 7 [PH] (ref 5–8)
PH UR STRIP: 7 [PH] (ref 5–8)
PLATELET # BLD AUTO: 94 K/UL (ref 150–450)
PMV BLD AUTO: 13.9 FL (ref 9.2–12.9)
POCT GLUCOSE: 106 MG/DL (ref 70–110)
POCT GLUCOSE: 68 MG/DL (ref 70–110)
POCT GLUCOSE: 83 MG/DL (ref 70–110)
POCT GLUCOSE: 83 MG/DL (ref 70–110)
POCT GLUCOSE: 84 MG/DL (ref 70–110)
POTASSIUM SERPL-SCNC: 4.2 MMOL/L (ref 3.5–5.1)
PROT SERPL-MCNC: 6.7 G/DL (ref 6–8.4)
PROT UR QL STRIP: ABNORMAL
PROT UR QL STRIP: ABNORMAL
RBC # BLD AUTO: 3.64 M/UL (ref 4–5.4)
RBC #/AREA URNS AUTO: 10 /HPF (ref 0–4)
SODIUM SERPL-SCNC: 134 MMOL/L (ref 136–145)
SP GR UR STRIP: 1.03 (ref 1–1.03)
SP GR UR STRIP: 1.03 (ref 1–1.03)
URN SPEC COLLECT METH UR: ABNORMAL
URN SPEC COLLECT METH UR: ABNORMAL
WBC # BLD AUTO: 4.58 K/UL (ref 3.9–12.7)
WBC #/AREA URNS AUTO: >100 /HPF (ref 0–5)
WBC CLUMPS UR QL AUTO: ABNORMAL

## 2021-05-11 PROCEDURE — 99226 PR SUBSEQUENT OBSERVATION CARE,LEVEL III: ICD-10-PCS | Mod: ,,, | Performed by: PHYSICIAN ASSISTANT

## 2021-05-11 PROCEDURE — 36415 COLL VENOUS BLD VENIPUNCTURE: CPT | Performed by: INTERNAL MEDICINE

## 2021-05-11 PROCEDURE — 63600175 PHARM REV CODE 636 W HCPCS: Performed by: PHYSICIAN ASSISTANT

## 2021-05-11 PROCEDURE — 90935 HEMODIALYSIS ONE EVALUATION: CPT

## 2021-05-11 PROCEDURE — 80100014 HC HEMODIALYSIS 1:1

## 2021-05-11 PROCEDURE — 87086 URINE CULTURE/COLONY COUNT: CPT | Performed by: PHYSICIAN ASSISTANT

## 2021-05-11 PROCEDURE — 25000003 PHARM REV CODE 250: Performed by: STUDENT IN AN ORGANIZED HEALTH CARE EDUCATION/TRAINING PROGRAM

## 2021-05-11 PROCEDURE — 96361 HYDRATE IV INFUSION ADD-ON: CPT | Performed by: EMERGENCY MEDICINE

## 2021-05-11 PROCEDURE — 25000003 PHARM REV CODE 250: Performed by: PHYSICIAN ASSISTANT

## 2021-05-11 PROCEDURE — 82947 ASSAY GLUCOSE BLOOD QUANT: CPT | Performed by: INTERNAL MEDICINE

## 2021-05-11 PROCEDURE — 80053 COMPREHEN METABOLIC PANEL: CPT | Performed by: PHYSICIAN ASSISTANT

## 2021-05-11 PROCEDURE — G0257 UNSCHED DIALYSIS ESRD PT HOS: HCPCS

## 2021-05-11 PROCEDURE — S0166 INJ OLANZAPINE 2.5MG: HCPCS | Performed by: PHYSICIAN ASSISTANT

## 2021-05-11 PROCEDURE — 83735 ASSAY OF MAGNESIUM: CPT | Performed by: PHYSICIAN ASSISTANT

## 2021-05-11 PROCEDURE — 96375 TX/PRO/DX INJ NEW DRUG ADDON: CPT | Performed by: EMERGENCY MEDICINE

## 2021-05-11 PROCEDURE — 99226 PR SUBSEQUENT OBSERVATION CARE,LEVEL III: CPT | Mod: ,,, | Performed by: PHYSICIAN ASSISTANT

## 2021-05-11 PROCEDURE — 81001 URINALYSIS AUTO W/SCOPE: CPT | Performed by: PHYSICIAN ASSISTANT

## 2021-05-11 PROCEDURE — 96372 THER/PROPH/DIAG INJ SC/IM: CPT | Mod: 59 | Performed by: EMERGENCY MEDICINE

## 2021-05-11 PROCEDURE — G0378 HOSPITAL OBSERVATION PER HR: HCPCS

## 2021-05-11 PROCEDURE — 85025 COMPLETE CBC W/AUTO DIFF WBC: CPT | Performed by: PHYSICIAN ASSISTANT

## 2021-05-11 RX ORDER — OLANZAPINE 10 MG/2ML
2.5 INJECTION, POWDER, FOR SOLUTION INTRAMUSCULAR ONCE AS NEEDED
Status: COMPLETED | OUTPATIENT
Start: 2021-05-11 | End: 2021-05-11

## 2021-05-11 RX ORDER — CEFTRIAXONE 1 G/1
1 INJECTION, POWDER, FOR SOLUTION INTRAMUSCULAR; INTRAVENOUS
Status: DISCONTINUED | OUTPATIENT
Start: 2021-05-11 | End: 2021-05-17

## 2021-05-11 RX ADMIN — ACETAMINOPHEN 650 MG: 325 TABLET ORAL at 08:05

## 2021-05-11 RX ADMIN — CEFTRIAXONE 1 G: 1 INJECTION, POWDER, FOR SOLUTION INTRAMUSCULAR; INTRAVENOUS at 06:05

## 2021-05-11 RX ADMIN — MELATONIN TAB 3 MG 6 MG: 3 TAB at 08:05

## 2021-05-11 RX ADMIN — OLANZAPINE 2.5 MG: 10 INJECTION, POWDER, FOR SOLUTION INTRAMUSCULAR at 05:05

## 2021-05-11 RX ADMIN — OLANZAPINE 2.5 MG: 10 INJECTION, POWDER, FOR SOLUTION INTRAMUSCULAR at 10:05

## 2021-05-11 RX ADMIN — SODIUM CHLORIDE, SODIUM LACTATE, POTASSIUM CHLORIDE, AND CALCIUM CHLORIDE 250 ML: .6; .31; .03; .02 INJECTION, SOLUTION INTRAVENOUS at 08:05

## 2021-05-11 RX ADMIN — MUPIROCIN: 20 OINTMENT TOPICAL at 08:05

## 2021-05-12 PROBLEM — R41.0 DELIRIUM: Status: ACTIVE | Noted: 2021-05-12

## 2021-05-12 PROBLEM — J18.9 PNEUMONIA: Status: ACTIVE | Noted: 2021-05-12

## 2021-05-12 PROBLEM — G93.40 ACUTE ENCEPHALOPATHY: Status: ACTIVE | Noted: 2021-05-12

## 2021-05-12 PROBLEM — Z91.89: Status: ACTIVE | Noted: 2021-05-12

## 2021-05-12 LAB
ALBUMIN SERPL BCP-MCNC: 3 G/DL (ref 3.5–5.2)
ALP SERPL-CCNC: 88 U/L (ref 55–135)
ALT SERPL W/O P-5'-P-CCNC: 6 U/L (ref 10–44)
ANION GAP SERPL CALC-SCNC: 16 MMOL/L (ref 8–16)
AST SERPL-CCNC: 14 U/L (ref 10–40)
BACTERIA UR CULT: NORMAL
BACTERIA UR CULT: NORMAL
BASOPHILS # BLD AUTO: 0.03 K/UL (ref 0–0.2)
BASOPHILS NFR BLD: 0.8 % (ref 0–1.9)
BILIRUB SERPL-MCNC: 0.4 MG/DL (ref 0.1–1)
BUN SERPL-MCNC: 44 MG/DL (ref 8–23)
CALCIUM SERPL-MCNC: 8.7 MG/DL (ref 8.7–10.5)
CHLORIDE SERPL-SCNC: 97 MMOL/L (ref 95–110)
CO2 SERPL-SCNC: 19 MMOL/L (ref 23–29)
CREAT SERPL-MCNC: 8.9 MG/DL (ref 0.5–1.4)
DIFFERENTIAL METHOD: ABNORMAL
EOSINOPHIL # BLD AUTO: 0.1 K/UL (ref 0–0.5)
EOSINOPHIL NFR BLD: 3.3 % (ref 0–8)
ERYTHROCYTE [DISTWIDTH] IN BLOOD BY AUTOMATED COUNT: 14.2 % (ref 11.5–14.5)
EST. GFR  (AFRICAN AMERICAN): 4.5 ML/MIN/1.73 M^2
EST. GFR  (NON AFRICAN AMERICAN): 3.9 ML/MIN/1.73 M^2
GLUCOSE SERPL-MCNC: 114 MG/DL (ref 70–110)
GLUCOSE SERPL-MCNC: 116 MG/DL (ref 70–110)
GLUCOSE SERPL-MCNC: 58 MG/DL (ref 70–110)
HCT VFR BLD AUTO: 34.1 % (ref 37–48.5)
HGB BLD-MCNC: 11.8 G/DL (ref 12–16)
IMM GRANULOCYTES # BLD AUTO: 0.01 K/UL (ref 0–0.04)
IMM GRANULOCYTES NFR BLD AUTO: 0.3 % (ref 0–0.5)
LYMPHOCYTES # BLD AUTO: 0.7 K/UL (ref 1–4.8)
LYMPHOCYTES NFR BLD: 19.1 % (ref 18–48)
MAGNESIUM SERPL-MCNC: 2.1 MG/DL (ref 1.6–2.6)
MCH RBC QN AUTO: 30.1 PG (ref 27–31)
MCHC RBC AUTO-ENTMCNC: 34.6 G/DL (ref 32–36)
MCV RBC AUTO: 87 FL (ref 82–98)
MONOCYTES # BLD AUTO: 0.5 K/UL (ref 0.3–1)
MONOCYTES NFR BLD: 12.7 % (ref 4–15)
NEUTROPHILS # BLD AUTO: 2.3 K/UL (ref 1.8–7.7)
NEUTROPHILS NFR BLD: 63.8 % (ref 38–73)
NRBC BLD-RTO: 0 /100 WBC
PLATELET # BLD AUTO: 57 K/UL (ref 150–450)
PMV BLD AUTO: ABNORMAL FL (ref 9.2–12.9)
POCT GLUCOSE: 144 MG/DL (ref 70–110)
POCT GLUCOSE: 166 MG/DL (ref 70–110)
POCT GLUCOSE: 205 MG/DL (ref 70–110)
POCT GLUCOSE: 64 MG/DL (ref 70–110)
POCT GLUCOSE: 75 MG/DL (ref 70–110)
POCT GLUCOSE: 97 MG/DL (ref 70–110)
POTASSIUM SERPL-SCNC: 4.9 MMOL/L (ref 3.5–5.1)
PROCALCITONIN SERPL IA-MCNC: 0.52 NG/ML
PROT SERPL-MCNC: 7 G/DL (ref 6–8.4)
RBC # BLD AUTO: 3.92 M/UL (ref 4–5.4)
SODIUM SERPL-SCNC: 132 MMOL/L (ref 136–145)
WBC # BLD AUTO: 3.61 K/UL (ref 3.9–12.7)

## 2021-05-12 PROCEDURE — 96374 THER/PROPH/DIAG INJ IV PUSH: CPT | Performed by: EMERGENCY MEDICINE

## 2021-05-12 PROCEDURE — 85025 COMPLETE CBC W/AUTO DIFF WBC: CPT | Performed by: PHYSICIAN ASSISTANT

## 2021-05-12 PROCEDURE — 36415 COLL VENOUS BLD VENIPUNCTURE: CPT | Performed by: PHYSICIAN ASSISTANT

## 2021-05-12 PROCEDURE — 97116 GAIT TRAINING THERAPY: CPT

## 2021-05-12 PROCEDURE — 97530 THERAPEUTIC ACTIVITIES: CPT

## 2021-05-12 PROCEDURE — G0378 HOSPITAL OBSERVATION PER HR: HCPCS

## 2021-05-12 PROCEDURE — 82947 ASSAY GLUCOSE BLOOD QUANT: CPT | Performed by: INTERNAL MEDICINE

## 2021-05-12 PROCEDURE — 36415 COLL VENOUS BLD VENIPUNCTURE: CPT | Performed by: INTERNAL MEDICINE

## 2021-05-12 PROCEDURE — 63600175 PHARM REV CODE 636 W HCPCS: Performed by: PHYSICIAN ASSISTANT

## 2021-05-12 PROCEDURE — 63600175 PHARM REV CODE 636 W HCPCS: Performed by: INTERNAL MEDICINE

## 2021-05-12 PROCEDURE — 25000003 PHARM REV CODE 250: Performed by: PHYSICIAN ASSISTANT

## 2021-05-12 PROCEDURE — 96375 TX/PRO/DX INJ NEW DRUG ADDON: CPT | Performed by: EMERGENCY MEDICINE

## 2021-05-12 PROCEDURE — 82947 ASSAY GLUCOSE BLOOD QUANT: CPT | Mod: 91 | Performed by: INTERNAL MEDICINE

## 2021-05-12 PROCEDURE — 25000003 PHARM REV CODE 250: Performed by: STUDENT IN AN ORGANIZED HEALTH CARE EDUCATION/TRAINING PROGRAM

## 2021-05-12 PROCEDURE — 83735 ASSAY OF MAGNESIUM: CPT | Performed by: PHYSICIAN ASSISTANT

## 2021-05-12 PROCEDURE — 96372 THER/PROPH/DIAG INJ SC/IM: CPT | Mod: 59 | Performed by: EMERGENCY MEDICINE

## 2021-05-12 PROCEDURE — 99226 PR SUBSEQUENT OBSERVATION CARE,LEVEL III: ICD-10-PCS | Mod: ,,, | Performed by: PHYSICIAN ASSISTANT

## 2021-05-12 PROCEDURE — 80053 COMPREHEN METABOLIC PANEL: CPT | Performed by: PHYSICIAN ASSISTANT

## 2021-05-12 PROCEDURE — 96376 TX/PRO/DX INJ SAME DRUG ADON: CPT | Performed by: EMERGENCY MEDICINE

## 2021-05-12 PROCEDURE — 99226 PR SUBSEQUENT OBSERVATION CARE,LEVEL III: CPT | Mod: ,,, | Performed by: PHYSICIAN ASSISTANT

## 2021-05-12 PROCEDURE — 84145 PROCALCITONIN (PCT): CPT | Performed by: PHYSICIAN ASSISTANT

## 2021-05-12 RX ORDER — ONDANSETRON 2 MG/ML
4 INJECTION INTRAMUSCULAR; INTRAVENOUS ONCE AS NEEDED
Status: DISCONTINUED | OUTPATIENT
Start: 2021-05-12 | End: 2021-05-18 | Stop reason: HOSPADM

## 2021-05-12 RX ORDER — SODIUM CHLORIDE 9 MG/ML
INJECTION, SOLUTION INTRAVENOUS ONCE
Status: COMPLETED | OUTPATIENT
Start: 2021-05-13 | End: 2021-05-13

## 2021-05-12 RX ORDER — HEPARIN SODIUM 1000 [USP'U]/ML
1000 INJECTION, SOLUTION INTRAVENOUS; SUBCUTANEOUS
Status: DISCONTINUED | OUTPATIENT
Start: 2021-05-13 | End: 2021-05-13

## 2021-05-12 RX ADMIN — ATORVASTATIN CALCIUM 40 MG: 20 TABLET, FILM COATED ORAL at 09:05

## 2021-05-12 RX ADMIN — CYANOCOBALAMIN 1000 MCG: 1000 INJECTION, SOLUTION INTRAMUSCULAR; SUBCUTANEOUS at 10:05

## 2021-05-12 RX ADMIN — MUPIROCIN: 20 OINTMENT TOPICAL at 09:05

## 2021-05-12 RX ADMIN — SEVELAMER CARBONATE 800 MG: 800 TABLET, FILM COATED ORAL at 10:05

## 2021-05-12 RX ADMIN — INSULIN ASPART 2 UNITS: 100 INJECTION, SOLUTION INTRAVENOUS; SUBCUTANEOUS at 01:05

## 2021-05-12 RX ADMIN — SEVELAMER CARBONATE 800 MG: 800 TABLET, FILM COATED ORAL at 05:05

## 2021-05-12 RX ADMIN — AZITHROMYCIN MONOHYDRATE 500 MG: 500 INJECTION, POWDER, LYOPHILIZED, FOR SOLUTION INTRAVENOUS at 10:05

## 2021-05-12 RX ADMIN — METOPROLOL TARTRATE 25 MG: 25 TABLET, FILM COATED ORAL at 09:05

## 2021-05-12 RX ADMIN — CEFTRIAXONE 1 G: 1 INJECTION, POWDER, FOR SOLUTION INTRAMUSCULAR; INTRAVENOUS at 05:05

## 2021-05-12 RX ADMIN — AMLODIPINE BESYLATE 5 MG: 5 TABLET ORAL at 09:05

## 2021-05-12 RX ADMIN — SEVELAMER CARBONATE 800 MG: 800 TABLET, FILM COATED ORAL at 01:05

## 2021-05-12 RX ADMIN — CLOPIDOGREL 75 MG: 75 TABLET, FILM COATED ORAL at 09:05

## 2021-05-12 RX ADMIN — ASPIRIN 81 MG: 81 TABLET, COATED ORAL at 09:05

## 2021-05-12 RX ADMIN — DEXTROSE MONOHYDRATE 25 G: 25 INJECTION, SOLUTION INTRAVENOUS at 04:05

## 2021-05-13 LAB
ALBUMIN SERPL BCP-MCNC: 2.9 G/DL (ref 3.5–5.2)
ALP SERPL-CCNC: 79 U/L (ref 55–135)
ALT SERPL W/O P-5'-P-CCNC: <5 U/L (ref 10–44)
ANION GAP SERPL CALC-SCNC: 17 MMOL/L (ref 8–16)
AST SERPL-CCNC: 10 U/L (ref 10–40)
BASOPHILS # BLD AUTO: 0.04 K/UL (ref 0–0.2)
BASOPHILS NFR BLD: 0.8 % (ref 0–1.9)
BILIRUB SERPL-MCNC: 0.4 MG/DL (ref 0.1–1)
BUN SERPL-MCNC: 56 MG/DL (ref 8–23)
CALCIUM SERPL-MCNC: 8.7 MG/DL (ref 8.7–10.5)
CHLORIDE SERPL-SCNC: 97 MMOL/L (ref 95–110)
CO2 SERPL-SCNC: 20 MMOL/L (ref 23–29)
CREAT SERPL-MCNC: 9.5 MG/DL (ref 0.5–1.4)
DIFFERENTIAL METHOD: ABNORMAL
EOSINOPHIL # BLD AUTO: 0.2 K/UL (ref 0–0.5)
EOSINOPHIL NFR BLD: 3.5 % (ref 0–8)
ERYTHROCYTE [DISTWIDTH] IN BLOOD BY AUTOMATED COUNT: 14.2 % (ref 11.5–14.5)
EST. GFR  (AFRICAN AMERICAN): 4.1 ML/MIN/1.73 M^2
EST. GFR  (NON AFRICAN AMERICAN): 3.6 ML/MIN/1.73 M^2
GLUCOSE SERPL-MCNC: 49 MG/DL (ref 70–110)
HCT VFR BLD AUTO: 32.2 % (ref 37–48.5)
HGB BLD-MCNC: 10.6 G/DL (ref 12–16)
IMM GRANULOCYTES # BLD AUTO: 0.01 K/UL (ref 0–0.04)
IMM GRANULOCYTES NFR BLD AUTO: 0.2 % (ref 0–0.5)
LYMPHOCYTES # BLD AUTO: 1.1 K/UL (ref 1–4.8)
LYMPHOCYTES NFR BLD: 22.2 % (ref 18–48)
MAGNESIUM SERPL-MCNC: 2.1 MG/DL (ref 1.6–2.6)
MCH RBC QN AUTO: 29.5 PG (ref 27–31)
MCHC RBC AUTO-ENTMCNC: 32.9 G/DL (ref 32–36)
MCV RBC AUTO: 90 FL (ref 82–98)
MONOCYTES # BLD AUTO: 0.6 K/UL (ref 0.3–1)
MONOCYTES NFR BLD: 12.2 % (ref 4–15)
NEUTROPHILS # BLD AUTO: 3 K/UL (ref 1.8–7.7)
NEUTROPHILS NFR BLD: 61.1 % (ref 38–73)
NRBC BLD-RTO: 0 /100 WBC
PLATELET # BLD AUTO: 113 K/UL (ref 150–450)
PMV BLD AUTO: 13.4 FL (ref 9.2–12.9)
POCT GLUCOSE: 102 MG/DL (ref 70–110)
POCT GLUCOSE: 130 MG/DL (ref 70–110)
POCT GLUCOSE: 72 MG/DL (ref 70–110)
POCT GLUCOSE: 86 MG/DL (ref 70–110)
POCT GLUCOSE: 90 MG/DL (ref 70–110)
POTASSIUM SERPL-SCNC: 5.1 MMOL/L (ref 3.5–5.1)
PROT SERPL-MCNC: 6.8 G/DL (ref 6–8.4)
RBC # BLD AUTO: 3.59 M/UL (ref 4–5.4)
SODIUM SERPL-SCNC: 134 MMOL/L (ref 136–145)
WBC # BLD AUTO: 4.83 K/UL (ref 3.9–12.7)

## 2021-05-13 PROCEDURE — 83735 ASSAY OF MAGNESIUM: CPT | Performed by: PHYSICIAN ASSISTANT

## 2021-05-13 PROCEDURE — 87040 BLOOD CULTURE FOR BACTERIA: CPT | Mod: 59 | Performed by: PHYSICIAN ASSISTANT

## 2021-05-13 PROCEDURE — 36415 COLL VENOUS BLD VENIPUNCTURE: CPT | Performed by: PHYSICIAN ASSISTANT

## 2021-05-13 PROCEDURE — 11000001 HC ACUTE MED/SURG PRIVATE ROOM

## 2021-05-13 PROCEDURE — 99233 PR SUBSEQUENT HOSPITAL CARE,LEVL III: ICD-10-PCS | Mod: ,,, | Performed by: PHYSICIAN ASSISTANT

## 2021-05-13 PROCEDURE — 85025 COMPLETE CBC W/AUTO DIFF WBC: CPT | Performed by: PHYSICIAN ASSISTANT

## 2021-05-13 PROCEDURE — 63600175 PHARM REV CODE 636 W HCPCS: Performed by: PHYSICIAN ASSISTANT

## 2021-05-13 PROCEDURE — 25000003 PHARM REV CODE 250: Performed by: PHYSICIAN ASSISTANT

## 2021-05-13 PROCEDURE — 99233 SBSQ HOSP IP/OBS HIGH 50: CPT | Mod: ,,, | Performed by: PHYSICIAN ASSISTANT

## 2021-05-13 PROCEDURE — 63600175 PHARM REV CODE 636 W HCPCS: Performed by: INTERNAL MEDICINE

## 2021-05-13 PROCEDURE — 96376 TX/PRO/DX INJ SAME DRUG ADON: CPT | Performed by: EMERGENCY MEDICINE

## 2021-05-13 PROCEDURE — 80053 COMPREHEN METABOLIC PANEL: CPT | Performed by: PHYSICIAN ASSISTANT

## 2021-05-13 PROCEDURE — 63600175 PHARM REV CODE 636 W HCPCS: Mod: TB | Performed by: STUDENT IN AN ORGANIZED HEALTH CARE EDUCATION/TRAINING PROGRAM

## 2021-05-13 PROCEDURE — 99232 PR SUBSEQUENT HOSPITAL CARE,LEVL II: ICD-10-PCS | Mod: ,,, | Performed by: INTERNAL MEDICINE

## 2021-05-13 PROCEDURE — 97116 GAIT TRAINING THERAPY: CPT

## 2021-05-13 PROCEDURE — 90935 HEMODIALYSIS ONE EVALUATION: CPT

## 2021-05-13 PROCEDURE — 25000003 PHARM REV CODE 250: Performed by: STUDENT IN AN ORGANIZED HEALTH CARE EDUCATION/TRAINING PROGRAM

## 2021-05-13 PROCEDURE — 99232 SBSQ HOSP IP/OBS MODERATE 35: CPT | Mod: ,,, | Performed by: INTERNAL MEDICINE

## 2021-05-13 RX ADMIN — MUPIROCIN: 20 OINTMENT TOPICAL at 01:05

## 2021-05-13 RX ADMIN — CLOPIDOGREL 75 MG: 75 TABLET, FILM COATED ORAL at 01:05

## 2021-05-13 RX ADMIN — SODIUM CHLORIDE: 0.9 INJECTION, SOLUTION INTRAVENOUS at 09:05

## 2021-05-13 RX ADMIN — AZITHROMYCIN MONOHYDRATE 500 MG: 500 INJECTION, POWDER, LYOPHILIZED, FOR SOLUTION INTRAVENOUS at 01:05

## 2021-05-13 RX ADMIN — METOPROLOL TARTRATE 25 MG: 25 TABLET, FILM COATED ORAL at 01:05

## 2021-05-13 RX ADMIN — SEVELAMER CARBONATE 800 MG: 800 TABLET, FILM COATED ORAL at 05:05

## 2021-05-13 RX ADMIN — CYANOCOBALAMIN 1000 MCG: 1000 INJECTION, SOLUTION INTRAMUSCULAR; SUBCUTANEOUS at 01:05

## 2021-05-13 RX ADMIN — SEVELAMER CARBONATE 800 MG: 800 TABLET, FILM COATED ORAL at 01:05

## 2021-05-13 RX ADMIN — ATORVASTATIN CALCIUM 40 MG: 20 TABLET, FILM COATED ORAL at 01:05

## 2021-05-13 RX ADMIN — AMLODIPINE BESYLATE 5 MG: 5 TABLET ORAL at 01:05

## 2021-05-13 RX ADMIN — ASPIRIN 81 MG: 81 TABLET, COATED ORAL at 01:05

## 2021-05-13 RX ADMIN — EPOETIN ALFA-EPBX 4160 UNITS: 2000 INJECTION, SOLUTION INTRAVENOUS; SUBCUTANEOUS at 05:05

## 2021-05-13 RX ADMIN — Medication 16 G: at 08:05

## 2021-05-13 RX ADMIN — CEFTRIAXONE 1 G: 1 INJECTION, POWDER, FOR SOLUTION INTRAMUSCULAR; INTRAVENOUS at 06:05

## 2021-05-13 RX ADMIN — MUPIROCIN: 20 OINTMENT TOPICAL at 08:05

## 2021-05-14 LAB
ALBUMIN SERPL BCP-MCNC: 2.9 G/DL (ref 3.5–5.2)
ALP SERPL-CCNC: 79 U/L (ref 55–135)
ALT SERPL W/O P-5'-P-CCNC: 5 U/L (ref 10–44)
ANION GAP SERPL CALC-SCNC: 13 MMOL/L (ref 8–16)
AST SERPL-CCNC: 15 U/L (ref 10–40)
BASOPHILS # BLD AUTO: 0.05 K/UL (ref 0–0.2)
BASOPHILS NFR BLD: 1 % (ref 0–1.9)
BILIRUB SERPL-MCNC: 0.4 MG/DL (ref 0.1–1)
BUN SERPL-MCNC: 29 MG/DL (ref 8–23)
CALCIUM SERPL-MCNC: 8.9 MG/DL (ref 8.7–10.5)
CHLORIDE SERPL-SCNC: 97 MMOL/L (ref 95–110)
CO2 SERPL-SCNC: 26 MMOL/L (ref 23–29)
CREAT SERPL-MCNC: 6.8 MG/DL (ref 0.5–1.4)
DIFFERENTIAL METHOD: ABNORMAL
EOSINOPHIL # BLD AUTO: 0.2 K/UL (ref 0–0.5)
EOSINOPHIL NFR BLD: 4.2 % (ref 0–8)
ERYTHROCYTE [DISTWIDTH] IN BLOOD BY AUTOMATED COUNT: 14.5 % (ref 11.5–14.5)
EST. GFR  (AFRICAN AMERICAN): 6.2 ML/MIN/1.73 M^2
EST. GFR  (NON AFRICAN AMERICAN): 5.4 ML/MIN/1.73 M^2
GLUCOSE SERPL-MCNC: 70 MG/DL (ref 70–110)
HCT VFR BLD AUTO: 32 % (ref 37–48.5)
HGB BLD-MCNC: 10.3 G/DL (ref 12–16)
IMM GRANULOCYTES # BLD AUTO: 0.02 K/UL (ref 0–0.04)
IMM GRANULOCYTES NFR BLD AUTO: 0.4 % (ref 0–0.5)
LYMPHOCYTES # BLD AUTO: 1.2 K/UL (ref 1–4.8)
LYMPHOCYTES NFR BLD: 24.5 % (ref 18–48)
MAGNESIUM SERPL-MCNC: 2 MG/DL (ref 1.6–2.6)
MCH RBC QN AUTO: 29.8 PG (ref 27–31)
MCHC RBC AUTO-ENTMCNC: 32.2 G/DL (ref 32–36)
MCV RBC AUTO: 93 FL (ref 82–98)
MONOCYTES # BLD AUTO: 0.7 K/UL (ref 0.3–1)
MONOCYTES NFR BLD: 13.8 % (ref 4–15)
NEUTROPHILS # BLD AUTO: 2.7 K/UL (ref 1.8–7.7)
NEUTROPHILS NFR BLD: 56.1 % (ref 38–73)
NRBC BLD-RTO: 0 /100 WBC
PLATELET # BLD AUTO: 134 K/UL (ref 150–450)
PMV BLD AUTO: 13.8 FL (ref 9.2–12.9)
POCT GLUCOSE: 109 MG/DL (ref 70–110)
POCT GLUCOSE: 110 MG/DL (ref 70–110)
POCT GLUCOSE: 50 MG/DL (ref 70–110)
POCT GLUCOSE: 50 MG/DL (ref 70–110)
POCT GLUCOSE: 68 MG/DL (ref 70–110)
POCT GLUCOSE: 69 MG/DL (ref 70–110)
POCT GLUCOSE: 79 MG/DL (ref 70–110)
POCT GLUCOSE: 85 MG/DL (ref 70–110)
POCT GLUCOSE: 86 MG/DL (ref 70–110)
POCT GLUCOSE: 88 MG/DL (ref 70–110)
POCT GLUCOSE: 99 MG/DL (ref 70–110)
POTASSIUM SERPL-SCNC: 4.5 MMOL/L (ref 3.5–5.1)
PROT SERPL-MCNC: 6.7 G/DL (ref 6–8.4)
RBC # BLD AUTO: 3.46 M/UL (ref 4–5.4)
SODIUM SERPL-SCNC: 136 MMOL/L (ref 136–145)
WBC # BLD AUTO: 4.77 K/UL (ref 3.9–12.7)

## 2021-05-14 PROCEDURE — 25000003 PHARM REV CODE 250: Performed by: INTERNAL MEDICINE

## 2021-05-14 PROCEDURE — 85025 COMPLETE CBC W/AUTO DIFF WBC: CPT | Performed by: PHYSICIAN ASSISTANT

## 2021-05-14 PROCEDURE — 99233 PR SUBSEQUENT HOSPITAL CARE,LEVL III: ICD-10-PCS | Mod: ,,, | Performed by: PHYSICIAN ASSISTANT

## 2021-05-14 PROCEDURE — 25000003 PHARM REV CODE 250: Performed by: PHYSICIAN ASSISTANT

## 2021-05-14 PROCEDURE — 63600175 PHARM REV CODE 636 W HCPCS: Performed by: PHYSICIAN ASSISTANT

## 2021-05-14 PROCEDURE — 11000001 HC ACUTE MED/SURG PRIVATE ROOM

## 2021-05-14 PROCEDURE — 63600175 PHARM REV CODE 636 W HCPCS: Performed by: INTERNAL MEDICINE

## 2021-05-14 PROCEDURE — 36415 COLL VENOUS BLD VENIPUNCTURE: CPT | Performed by: PHYSICIAN ASSISTANT

## 2021-05-14 PROCEDURE — 83735 ASSAY OF MAGNESIUM: CPT | Performed by: PHYSICIAN ASSISTANT

## 2021-05-14 PROCEDURE — 99233 SBSQ HOSP IP/OBS HIGH 50: CPT | Mod: ,,, | Performed by: PHYSICIAN ASSISTANT

## 2021-05-14 PROCEDURE — 80053 COMPREHEN METABOLIC PANEL: CPT | Performed by: PHYSICIAN ASSISTANT

## 2021-05-14 RX ORDER — QUETIAPINE FUMARATE 25 MG/1
25 TABLET, FILM COATED ORAL NIGHTLY
Status: DISCONTINUED | OUTPATIENT
Start: 2021-05-14 | End: 2021-05-14

## 2021-05-14 RX ORDER — QUETIAPINE FUMARATE 25 MG/1
25 TABLET, FILM COATED ORAL NIGHTLY
Status: DISCONTINUED | OUTPATIENT
Start: 2021-05-14 | End: 2021-05-18 | Stop reason: HOSPADM

## 2021-05-14 RX ORDER — HEPARIN SODIUM 1000 [USP'U]/ML
1000 INJECTION, SOLUTION INTRAVENOUS; SUBCUTANEOUS
Status: DISCONTINUED | OUTPATIENT
Start: 2021-05-15 | End: 2021-05-18

## 2021-05-14 RX ORDER — SODIUM CHLORIDE 9 MG/ML
INJECTION, SOLUTION INTRAVENOUS ONCE
Status: COMPLETED | OUTPATIENT
Start: 2021-05-15 | End: 2021-05-15

## 2021-05-14 RX ADMIN — MUPIROCIN: 20 OINTMENT TOPICAL at 08:05

## 2021-05-14 RX ADMIN — AMLODIPINE BESYLATE 5 MG: 5 TABLET ORAL at 09:05

## 2021-05-14 RX ADMIN — Medication 16 G: at 08:05

## 2021-05-14 RX ADMIN — ACETAMINOPHEN 650 MG: 325 TABLET ORAL at 05:05

## 2021-05-14 RX ADMIN — AZITHROMYCIN MONOHYDRATE 500 MG: 500 INJECTION, POWDER, LYOPHILIZED, FOR SOLUTION INTRAVENOUS at 09:05

## 2021-05-14 RX ADMIN — ASPIRIN 81 MG: 81 TABLET, COATED ORAL at 09:05

## 2021-05-14 RX ADMIN — MUPIROCIN: 20 OINTMENT TOPICAL at 09:05

## 2021-05-14 RX ADMIN — SEVELAMER CARBONATE 800 MG: 800 TABLET, FILM COATED ORAL at 02:05

## 2021-05-14 RX ADMIN — METOPROLOL TARTRATE 25 MG: 25 TABLET, FILM COATED ORAL at 09:05

## 2021-05-14 RX ADMIN — CEFTRIAXONE 1 G: 1 INJECTION, POWDER, FOR SOLUTION INTRAMUSCULAR; INTRAVENOUS at 05:05

## 2021-05-14 RX ADMIN — ATORVASTATIN CALCIUM 40 MG: 20 TABLET, FILM COATED ORAL at 09:05

## 2021-05-14 RX ADMIN — SEVELAMER CARBONATE 800 MG: 800 TABLET, FILM COATED ORAL at 09:05

## 2021-05-14 RX ADMIN — CYANOCOBALAMIN 1000 MCG: 1000 INJECTION, SOLUTION INTRAMUSCULAR; SUBCUTANEOUS at 09:05

## 2021-05-14 RX ADMIN — QUETIAPINE FUMARATE 25 MG: 25 TABLET ORAL at 08:05

## 2021-05-14 RX ADMIN — CLOPIDOGREL 75 MG: 75 TABLET, FILM COATED ORAL at 09:05

## 2021-05-14 RX ADMIN — DEXTROSE MONOHYDRATE 12.5 G: 25 INJECTION, SOLUTION INTRAVENOUS at 01:05

## 2021-05-15 LAB
25(OH)D3+25(OH)D2 SERPL-MCNC: 17 NG/ML (ref 30–96)
ALBUMIN SERPL BCP-MCNC: 2.6 G/DL (ref 3.5–5.2)
ALP SERPL-CCNC: 78 U/L (ref 55–135)
ALT SERPL W/O P-5'-P-CCNC: 6 U/L (ref 10–44)
AMMONIA PLAS-SCNC: 24 UMOL/L (ref 10–50)
ANION GAP SERPL CALC-SCNC: 13 MMOL/L (ref 8–16)
AST SERPL-CCNC: 17 U/L (ref 10–40)
BASOPHILS # BLD AUTO: 0.05 K/UL (ref 0–0.2)
BASOPHILS NFR BLD: 1.1 % (ref 0–1.9)
BILIRUB SERPL-MCNC: 0.3 MG/DL (ref 0.1–1)
BUN SERPL-MCNC: 41 MG/DL (ref 8–23)
CALCIUM SERPL-MCNC: 8.6 MG/DL (ref 8.7–10.5)
CHLORIDE SERPL-SCNC: 98 MMOL/L (ref 95–110)
CO2 SERPL-SCNC: 23 MMOL/L (ref 23–29)
CREAT SERPL-MCNC: 9.3 MG/DL (ref 0.5–1.4)
DIFFERENTIAL METHOD: ABNORMAL
EOSINOPHIL # BLD AUTO: 0.3 K/UL (ref 0–0.5)
EOSINOPHIL NFR BLD: 6 % (ref 0–8)
ERYTHROCYTE [DISTWIDTH] IN BLOOD BY AUTOMATED COUNT: 14.1 % (ref 11.5–14.5)
EST. GFR  (AFRICAN AMERICAN): 4.2 ML/MIN/1.73 M^2
EST. GFR  (NON AFRICAN AMERICAN): 3.7 ML/MIN/1.73 M^2
GLUCOSE SERPL-MCNC: 124 MG/DL (ref 70–110)
HCT VFR BLD AUTO: 32.4 % (ref 37–48.5)
HGB BLD-MCNC: 11 G/DL (ref 12–16)
IMM GRANULOCYTES # BLD AUTO: 0.06 K/UL (ref 0–0.04)
IMM GRANULOCYTES NFR BLD AUTO: 1.3 % (ref 0–0.5)
LYMPHOCYTES # BLD AUTO: 1.2 K/UL (ref 1–4.8)
LYMPHOCYTES NFR BLD: 26.2 % (ref 18–48)
MAGNESIUM SERPL-MCNC: 2.3 MG/DL (ref 1.6–2.6)
MCH RBC QN AUTO: 29.8 PG (ref 27–31)
MCHC RBC AUTO-ENTMCNC: 34 G/DL (ref 32–36)
MCV RBC AUTO: 88 FL (ref 82–98)
MONOCYTES # BLD AUTO: 0.6 K/UL (ref 0.3–1)
MONOCYTES NFR BLD: 13.3 % (ref 4–15)
NEUTROPHILS # BLD AUTO: 2.3 K/UL (ref 1.8–7.7)
NEUTROPHILS NFR BLD: 52.1 % (ref 38–73)
NRBC BLD-RTO: 0 /100 WBC
PHOSPHATE SERPL-MCNC: 7.2 MG/DL (ref 2.7–4.5)
PLATELET # BLD AUTO: 125 K/UL (ref 150–450)
PLATELET BLD QL SMEAR: ABNORMAL
PMV BLD AUTO: 13.8 FL (ref 9.2–12.9)
POCT GLUCOSE: 124 MG/DL (ref 70–110)
POCT GLUCOSE: 141 MG/DL (ref 70–110)
POCT GLUCOSE: 158 MG/DL (ref 70–110)
POCT GLUCOSE: 168 MG/DL (ref 70–110)
POCT GLUCOSE: 175 MG/DL (ref 70–110)
POTASSIUM SERPL-SCNC: 5.8 MMOL/L (ref 3.5–5.1)
PROT SERPL-MCNC: 6.4 G/DL (ref 6–8.4)
RBC # BLD AUTO: 3.69 M/UL (ref 4–5.4)
RPR SER QL: NORMAL
SODIUM SERPL-SCNC: 134 MMOL/L (ref 136–145)
T4 FREE SERPL-MCNC: 0.99 NG/DL (ref 0.71–1.51)
TSH SERPL DL<=0.005 MIU/L-ACNC: 0.36 UIU/ML (ref 0.4–4)
WBC # BLD AUTO: 4.5 K/UL (ref 3.9–12.7)

## 2021-05-15 PROCEDURE — 36415 COLL VENOUS BLD VENIPUNCTURE: CPT | Performed by: PHYSICIAN ASSISTANT

## 2021-05-15 PROCEDURE — 82306 VITAMIN D 25 HYDROXY: CPT | Performed by: PHYSICIAN ASSISTANT

## 2021-05-15 PROCEDURE — 90935 HEMODIALYSIS ONE EVALUATION: CPT

## 2021-05-15 PROCEDURE — 99233 SBSQ HOSP IP/OBS HIGH 50: CPT | Mod: ,,, | Performed by: PHYSICIAN ASSISTANT

## 2021-05-15 PROCEDURE — 84591 ASSAY OF NOS VITAMIN: CPT | Performed by: PHYSICIAN ASSISTANT

## 2021-05-15 PROCEDURE — 25000003 PHARM REV CODE 250: Performed by: PHYSICIAN ASSISTANT

## 2021-05-15 PROCEDURE — 11000001 HC ACUTE MED/SURG PRIVATE ROOM

## 2021-05-15 PROCEDURE — 85025 COMPLETE CBC W/AUTO DIFF WBC: CPT | Performed by: PHYSICIAN ASSISTANT

## 2021-05-15 PROCEDURE — 25000003 PHARM REV CODE 250: Performed by: INTERNAL MEDICINE

## 2021-05-15 PROCEDURE — 63600175 PHARM REV CODE 636 W HCPCS: Performed by: INTERNAL MEDICINE

## 2021-05-15 PROCEDURE — 90935 HEMODIALYSIS ONE EVALUATION: CPT | Mod: ,,, | Performed by: NURSE PRACTITIONER

## 2021-05-15 PROCEDURE — 84100 ASSAY OF PHOSPHORUS: CPT | Performed by: PHYSICIAN ASSISTANT

## 2021-05-15 PROCEDURE — 82140 ASSAY OF AMMONIA: CPT | Performed by: PHYSICIAN ASSISTANT

## 2021-05-15 PROCEDURE — 25000003 PHARM REV CODE 250: Performed by: STUDENT IN AN ORGANIZED HEALTH CARE EDUCATION/TRAINING PROGRAM

## 2021-05-15 PROCEDURE — 86703 HIV-1/HIV-2 1 RESULT ANTBDY: CPT | Performed by: PHYSICIAN ASSISTANT

## 2021-05-15 PROCEDURE — 86592 SYPHILIS TEST NON-TREP QUAL: CPT | Performed by: PHYSICIAN ASSISTANT

## 2021-05-15 PROCEDURE — 83735 ASSAY OF MAGNESIUM: CPT | Performed by: PHYSICIAN ASSISTANT

## 2021-05-15 PROCEDURE — 99233 PR SUBSEQUENT HOSPITAL CARE,LEVL III: ICD-10-PCS | Mod: ,,, | Performed by: PHYSICIAN ASSISTANT

## 2021-05-15 PROCEDURE — 63600175 PHARM REV CODE 636 W HCPCS: Performed by: PHYSICIAN ASSISTANT

## 2021-05-15 PROCEDURE — 80053 COMPREHEN METABOLIC PANEL: CPT | Performed by: PHYSICIAN ASSISTANT

## 2021-05-15 PROCEDURE — 90935 PR HEMODIALYSIS, ONE EVALUATION: ICD-10-PCS | Mod: ,,, | Performed by: NURSE PRACTITIONER

## 2021-05-15 PROCEDURE — 84207 ASSAY OF VITAMIN B-6: CPT | Performed by: PHYSICIAN ASSISTANT

## 2021-05-15 PROCEDURE — 84439 ASSAY OF FREE THYROXINE: CPT | Performed by: PHYSICIAN ASSISTANT

## 2021-05-15 PROCEDURE — 84443 ASSAY THYROID STIM HORMONE: CPT | Performed by: PHYSICIAN ASSISTANT

## 2021-05-15 RX ORDER — SENNOSIDES 8.6 MG/1
8.6 TABLET ORAL 2 TIMES DAILY
Status: DISCONTINUED | OUTPATIENT
Start: 2021-05-15 | End: 2021-05-18 | Stop reason: HOSPADM

## 2021-05-15 RX ORDER — POLYETHYLENE GLYCOL 3350 17 G/17G
17 POWDER, FOR SOLUTION ORAL 2 TIMES DAILY
Status: DISCONTINUED | OUTPATIENT
Start: 2021-05-15 | End: 2021-05-18 | Stop reason: HOSPADM

## 2021-05-15 RX ADMIN — SEVELAMER CARBONATE 800 MG: 800 TABLET, FILM COATED ORAL at 01:05

## 2021-05-15 RX ADMIN — POLYETHYLENE GLYCOL 3350 17 G: 17 POWDER, FOR SOLUTION ORAL at 08:05

## 2021-05-15 RX ADMIN — CLOPIDOGREL 75 MG: 75 TABLET, FILM COATED ORAL at 12:05

## 2021-05-15 RX ADMIN — ACETAMINOPHEN 650 MG: 325 TABLET ORAL at 08:05

## 2021-05-15 RX ADMIN — SENNOSIDES 8.6 MG: 8.6 TABLET, FILM COATED ORAL at 01:05

## 2021-05-15 RX ADMIN — METOPROLOL TARTRATE 25 MG: 25 TABLET, FILM COATED ORAL at 01:05

## 2021-05-15 RX ADMIN — CYANOCOBALAMIN 1000 MCG: 1000 INJECTION, SOLUTION INTRAMUSCULAR; SUBCUTANEOUS at 01:05

## 2021-05-15 RX ADMIN — ATORVASTATIN CALCIUM 40 MG: 20 TABLET, FILM COATED ORAL at 12:05

## 2021-05-15 RX ADMIN — CEFTRIAXONE 1 G: 1 INJECTION, POWDER, FOR SOLUTION INTRAMUSCULAR; INTRAVENOUS at 05:05

## 2021-05-15 RX ADMIN — SEVELAMER CARBONATE 800 MG: 800 TABLET, FILM COATED ORAL at 05:05

## 2021-05-15 RX ADMIN — ASPIRIN 81 MG: 81 TABLET, COATED ORAL at 12:05

## 2021-05-15 RX ADMIN — QUETIAPINE FUMARATE 25 MG: 25 TABLET ORAL at 08:05

## 2021-05-15 RX ADMIN — ONDANSETRON 8 MG: 8 TABLET, ORALLY DISINTEGRATING ORAL at 08:05

## 2021-05-15 RX ADMIN — AZITHROMYCIN MONOHYDRATE 500 MG: 500 INJECTION, POWDER, LYOPHILIZED, FOR SOLUTION INTRAVENOUS at 01:05

## 2021-05-15 RX ADMIN — AMLODIPINE BESYLATE 5 MG: 5 TABLET ORAL at 01:05

## 2021-05-15 RX ADMIN — MELATONIN TAB 3 MG 6 MG: 3 TAB at 08:05

## 2021-05-15 RX ADMIN — SODIUM CHLORIDE: 0.9 INJECTION, SOLUTION INTRAVENOUS at 09:05

## 2021-05-15 RX ADMIN — SENNOSIDES 8.6 MG: 8.6 TABLET, FILM COATED ORAL at 08:05

## 2021-05-15 RX ADMIN — MUPIROCIN: 20 OINTMENT TOPICAL at 08:05

## 2021-05-16 LAB
ALBUMIN SERPL BCP-MCNC: 2.9 G/DL (ref 3.5–5.2)
ALP SERPL-CCNC: 92 U/L (ref 55–135)
ALT SERPL W/O P-5'-P-CCNC: 6 U/L (ref 10–44)
ANION GAP SERPL CALC-SCNC: 15 MMOL/L (ref 8–16)
AST SERPL-CCNC: 14 U/L (ref 10–40)
BASOPHILS # BLD AUTO: 0.03 K/UL (ref 0–0.2)
BASOPHILS NFR BLD: 0.5 % (ref 0–1.9)
BILIRUB SERPL-MCNC: 0.4 MG/DL (ref 0.1–1)
BUN SERPL-MCNC: 27 MG/DL (ref 8–23)
CALCIUM SERPL-MCNC: 9.4 MG/DL (ref 8.7–10.5)
CHLORIDE SERPL-SCNC: 101 MMOL/L (ref 95–110)
CO2 SERPL-SCNC: 22 MMOL/L (ref 23–29)
CREAT SERPL-MCNC: 6.9 MG/DL (ref 0.5–1.4)
DIFFERENTIAL METHOD: ABNORMAL
EOSINOPHIL # BLD AUTO: 0.3 K/UL (ref 0–0.5)
EOSINOPHIL NFR BLD: 4.4 % (ref 0–8)
ERYTHROCYTE [DISTWIDTH] IN BLOOD BY AUTOMATED COUNT: 14 % (ref 11.5–14.5)
EST. GFR  (AFRICAN AMERICAN): 6.1 ML/MIN/1.73 M^2
EST. GFR  (NON AFRICAN AMERICAN): 5.3 ML/MIN/1.73 M^2
GLUCOSE SERPL-MCNC: 122 MG/DL (ref 70–110)
GLUCOSE SERPL-MCNC: 99 MG/DL (ref 70–110)
HCT VFR BLD AUTO: 34.2 % (ref 37–48.5)
HGB BLD-MCNC: 11.3 G/DL (ref 12–16)
IMM GRANULOCYTES # BLD AUTO: 0.03 K/UL (ref 0–0.04)
IMM GRANULOCYTES NFR BLD AUTO: 0.5 % (ref 0–0.5)
LYMPHOCYTES # BLD AUTO: 1 K/UL (ref 1–4.8)
LYMPHOCYTES NFR BLD: 17.3 % (ref 18–48)
MAGNESIUM SERPL-MCNC: 2.2 MG/DL (ref 1.6–2.6)
MCH RBC QN AUTO: 30 PG (ref 27–31)
MCHC RBC AUTO-ENTMCNC: 33 G/DL (ref 32–36)
MCV RBC AUTO: 91 FL (ref 82–98)
MONOCYTES # BLD AUTO: 0.6 K/UL (ref 0.3–1)
MONOCYTES NFR BLD: 10.9 % (ref 4–15)
NEUTROPHILS # BLD AUTO: 3.8 K/UL (ref 1.8–7.7)
NEUTROPHILS NFR BLD: 66.4 % (ref 38–73)
NRBC BLD-RTO: 0 /100 WBC
PLATELET # BLD AUTO: 119 K/UL (ref 150–450)
PLATELET BLD QL SMEAR: ABNORMAL
PMV BLD AUTO: 13.6 FL (ref 9.2–12.9)
POCT GLUCOSE: 100 MG/DL (ref 70–110)
POCT GLUCOSE: 105 MG/DL (ref 70–110)
POCT GLUCOSE: 118 MG/DL (ref 70–110)
POCT GLUCOSE: 99 MG/DL (ref 70–110)
POTASSIUM SERPL-SCNC: 4.8 MMOL/L (ref 3.5–5.1)
PROT SERPL-MCNC: 7.1 G/DL (ref 6–8.4)
RBC # BLD AUTO: 3.77 M/UL (ref 4–5.4)
SODIUM SERPL-SCNC: 138 MMOL/L (ref 136–145)
WBC # BLD AUTO: 5.68 K/UL (ref 3.9–12.7)

## 2021-05-16 PROCEDURE — 25000003 PHARM REV CODE 250: Performed by: PHYSICIAN ASSISTANT

## 2021-05-16 PROCEDURE — 85025 COMPLETE CBC W/AUTO DIFF WBC: CPT | Performed by: PHYSICIAN ASSISTANT

## 2021-05-16 PROCEDURE — 63600175 PHARM REV CODE 636 W HCPCS: Performed by: INTERNAL MEDICINE

## 2021-05-16 PROCEDURE — S0166 INJ OLANZAPINE 2.5MG: HCPCS | Performed by: PHYSICIAN ASSISTANT

## 2021-05-16 PROCEDURE — 99232 SBSQ HOSP IP/OBS MODERATE 35: CPT | Mod: ,,, | Performed by: INTERNAL MEDICINE

## 2021-05-16 PROCEDURE — 83735 ASSAY OF MAGNESIUM: CPT | Performed by: PHYSICIAN ASSISTANT

## 2021-05-16 PROCEDURE — 36415 COLL VENOUS BLD VENIPUNCTURE: CPT | Performed by: PHYSICIAN ASSISTANT

## 2021-05-16 PROCEDURE — 36415 COLL VENOUS BLD VENIPUNCTURE: CPT | Performed by: INTERNAL MEDICINE

## 2021-05-16 PROCEDURE — 80053 COMPREHEN METABOLIC PANEL: CPT | Performed by: PHYSICIAN ASSISTANT

## 2021-05-16 PROCEDURE — 25000003 PHARM REV CODE 250: Performed by: INTERNAL MEDICINE

## 2021-05-16 PROCEDURE — 11000001 HC ACUTE MED/SURG PRIVATE ROOM

## 2021-05-16 PROCEDURE — 82947 ASSAY GLUCOSE BLOOD QUANT: CPT | Performed by: INTERNAL MEDICINE

## 2021-05-16 PROCEDURE — 63600175 PHARM REV CODE 636 W HCPCS: Performed by: PHYSICIAN ASSISTANT

## 2021-05-16 PROCEDURE — 99232 PR SUBSEQUENT HOSPITAL CARE,LEVL II: ICD-10-PCS | Mod: ,,, | Performed by: INTERNAL MEDICINE

## 2021-05-16 RX ORDER — OLANZAPINE 10 MG/2ML
2.5 INJECTION, POWDER, FOR SOLUTION INTRAMUSCULAR ONCE AS NEEDED
Status: COMPLETED | OUTPATIENT
Start: 2021-05-16 | End: 2021-05-16

## 2021-05-16 RX ADMIN — SEVELAMER CARBONATE 800 MG: 800 TABLET, FILM COATED ORAL at 12:05

## 2021-05-16 RX ADMIN — ATORVASTATIN CALCIUM 40 MG: 20 TABLET, FILM COATED ORAL at 08:05

## 2021-05-16 RX ADMIN — SEVELAMER CARBONATE 800 MG: 800 TABLET, FILM COATED ORAL at 08:05

## 2021-05-16 RX ADMIN — POLYETHYLENE GLYCOL 3350 17 G: 17 POWDER, FOR SOLUTION ORAL at 08:05

## 2021-05-16 RX ADMIN — QUETIAPINE FUMARATE 25 MG: 25 TABLET ORAL at 08:05

## 2021-05-16 RX ADMIN — SENNOSIDES 8.6 MG: 8.6 TABLET, FILM COATED ORAL at 09:05

## 2021-05-16 RX ADMIN — CLOPIDOGREL 75 MG: 75 TABLET, FILM COATED ORAL at 08:05

## 2021-05-16 RX ADMIN — AMLODIPINE BESYLATE 5 MG: 5 TABLET ORAL at 08:05

## 2021-05-16 RX ADMIN — SEVELAMER CARBONATE 800 MG: 800 TABLET, FILM COATED ORAL at 04:05

## 2021-05-16 RX ADMIN — MELATONIN TAB 3 MG 6 MG: 3 TAB at 08:05

## 2021-05-16 RX ADMIN — SENNOSIDES 8.6 MG: 8.6 TABLET, FILM COATED ORAL at 08:05

## 2021-05-16 RX ADMIN — CYANOCOBALAMIN 1000 MCG: 1000 INJECTION, SOLUTION INTRAMUSCULAR; SUBCUTANEOUS at 09:05

## 2021-05-16 RX ADMIN — OLANZAPINE 2.5 MG: 10 INJECTION, POWDER, LYOPHILIZED, FOR SOLUTION INTRAMUSCULAR at 06:05

## 2021-05-16 RX ADMIN — METOPROLOL TARTRATE 25 MG: 25 TABLET, FILM COATED ORAL at 08:05

## 2021-05-16 RX ADMIN — AZITHROMYCIN MONOHYDRATE 500 MG: 500 INJECTION, POWDER, LYOPHILIZED, FOR SOLUTION INTRAVENOUS at 08:05

## 2021-05-16 RX ADMIN — ASPIRIN 81 MG: 81 TABLET, COATED ORAL at 08:05

## 2021-05-17 LAB
ALBUMIN SERPL BCP-MCNC: 2.8 G/DL (ref 3.5–5.2)
ALP SERPL-CCNC: 93 U/L (ref 55–135)
ALT SERPL W/O P-5'-P-CCNC: 6 U/L (ref 10–44)
ANION GAP SERPL CALC-SCNC: 17 MMOL/L (ref 8–16)
AST SERPL-CCNC: 16 U/L (ref 10–40)
BASOPHILS # BLD AUTO: 0.06 K/UL (ref 0–0.2)
BASOPHILS NFR BLD: 1.3 % (ref 0–1.9)
BILIRUB SERPL-MCNC: 0.4 MG/DL (ref 0.1–1)
BUN SERPL-MCNC: 32 MG/DL (ref 8–23)
CALCIUM SERPL-MCNC: 8.9 MG/DL (ref 8.7–10.5)
CHLORIDE SERPL-SCNC: 100 MMOL/L (ref 95–110)
CO2 SERPL-SCNC: 18 MMOL/L (ref 23–29)
CREAT SERPL-MCNC: 7.8 MG/DL (ref 0.5–1.4)
DIFFERENTIAL METHOD: ABNORMAL
EOSINOPHIL # BLD AUTO: 0.2 K/UL (ref 0–0.5)
EOSINOPHIL NFR BLD: 5.1 % (ref 0–8)
ERYTHROCYTE [DISTWIDTH] IN BLOOD BY AUTOMATED COUNT: 14 % (ref 11.5–14.5)
EST. GFR  (AFRICAN AMERICAN): 5.2 ML/MIN/1.73 M^2
EST. GFR  (NON AFRICAN AMERICAN): 4.5 ML/MIN/1.73 M^2
GLUCOSE SERPL-MCNC: 41 MG/DL (ref 70–110)
HCT VFR BLD AUTO: 37.7 % (ref 37–48.5)
HGB BLD-MCNC: 12.3 G/DL (ref 12–16)
HIV 1+2 AB+HIV1 P24 AG SERPL QL IA: NEGATIVE
IMM GRANULOCYTES # BLD AUTO: 0.08 K/UL (ref 0–0.04)
IMM GRANULOCYTES NFR BLD AUTO: 1.8 % (ref 0–0.5)
LYMPHOCYTES # BLD AUTO: 1.1 K/UL (ref 1–4.8)
LYMPHOCYTES NFR BLD: 25.1 % (ref 18–48)
MAGNESIUM SERPL-MCNC: 2.2 MG/DL (ref 1.6–2.6)
MCH RBC QN AUTO: 30.1 PG (ref 27–31)
MCHC RBC AUTO-ENTMCNC: 32.6 G/DL (ref 32–36)
MCV RBC AUTO: 92 FL (ref 82–98)
MONOCYTES # BLD AUTO: 0.6 K/UL (ref 0.3–1)
MONOCYTES NFR BLD: 13 % (ref 4–15)
NEUTROPHILS # BLD AUTO: 2.4 K/UL (ref 1.8–7.7)
NEUTROPHILS NFR BLD: 53.7 % (ref 38–73)
NRBC BLD-RTO: 0 /100 WBC
PLATELET # BLD AUTO: 127 K/UL (ref 150–450)
PLATELET BLD QL SMEAR: ABNORMAL
PMV BLD AUTO: 13.5 FL (ref 9.2–12.9)
POCT GLUCOSE: 100 MG/DL (ref 70–110)
POCT GLUCOSE: 129 MG/DL (ref 70–110)
POCT GLUCOSE: 168 MG/DL (ref 70–110)
POCT GLUCOSE: 49 MG/DL (ref 70–110)
POCT GLUCOSE: 58 MG/DL (ref 70–110)
POCT GLUCOSE: 92 MG/DL (ref 70–110)
POTASSIUM SERPL-SCNC: 5 MMOL/L (ref 3.5–5.1)
POTASSIUM SERPL-SCNC: 5.5 MMOL/L (ref 3.5–5.1)
PROT SERPL-MCNC: 7.1 G/DL (ref 6–8.4)
RBC # BLD AUTO: 4.09 M/UL (ref 4–5.4)
SARS-COV-2 RNA RESP QL NAA+PROBE: NOT DETECTED
SODIUM SERPL-SCNC: 135 MMOL/L (ref 136–145)
WBC # BLD AUTO: 4.47 K/UL (ref 3.9–12.7)

## 2021-05-17 PROCEDURE — 63600175 PHARM REV CODE 636 W HCPCS: Performed by: PHYSICIAN ASSISTANT

## 2021-05-17 PROCEDURE — U0005 INFEC AGEN DETEC AMPLI PROBE: HCPCS | Performed by: PHYSICIAN ASSISTANT

## 2021-05-17 PROCEDURE — 36415 COLL VENOUS BLD VENIPUNCTURE: CPT | Performed by: PHYSICIAN ASSISTANT

## 2021-05-17 PROCEDURE — 97110 THERAPEUTIC EXERCISES: CPT | Mod: CQ

## 2021-05-17 PROCEDURE — 85025 COMPLETE CBC W/AUTO DIFF WBC: CPT | Performed by: PHYSICIAN ASSISTANT

## 2021-05-17 PROCEDURE — 94761 N-INVAS EAR/PLS OXIMETRY MLT: CPT

## 2021-05-17 PROCEDURE — 97112 NEUROMUSCULAR REEDUCATION: CPT | Mod: CQ

## 2021-05-17 PROCEDURE — 83735 ASSAY OF MAGNESIUM: CPT | Performed by: PHYSICIAN ASSISTANT

## 2021-05-17 PROCEDURE — U0003 INFECTIOUS AGENT DETECTION BY NUCLEIC ACID (DNA OR RNA); SEVERE ACUTE RESPIRATORY SYNDROME CORONAVIRUS 2 (SARS-COV-2) (CORONAVIRUS DISEASE [COVID-19]), AMPLIFIED PROBE TECHNIQUE, MAKING USE OF HIGH THROUGHPUT TECHNOLOGIES AS DESCRIBED BY CMS-2020-01-R: HCPCS | Performed by: PHYSICIAN ASSISTANT

## 2021-05-17 PROCEDURE — 97530 THERAPEUTIC ACTIVITIES: CPT

## 2021-05-17 PROCEDURE — 25000003 PHARM REV CODE 250: Performed by: PHYSICIAN ASSISTANT

## 2021-05-17 PROCEDURE — 11000001 HC ACUTE MED/SURG PRIVATE ROOM

## 2021-05-17 PROCEDURE — 80053 COMPREHEN METABOLIC PANEL: CPT | Performed by: PHYSICIAN ASSISTANT

## 2021-05-17 PROCEDURE — 36415 COLL VENOUS BLD VENIPUNCTURE: CPT | Performed by: INTERNAL MEDICINE

## 2021-05-17 PROCEDURE — 84132 ASSAY OF SERUM POTASSIUM: CPT | Performed by: INTERNAL MEDICINE

## 2021-05-17 PROCEDURE — 25000003 PHARM REV CODE 250: Performed by: INTERNAL MEDICINE

## 2021-05-17 RX ORDER — SENNOSIDES 8.6 MG/1
1 TABLET ORAL 2 TIMES DAILY
Status: ON HOLD
Start: 2021-05-17 | End: 2021-01-01 | Stop reason: HOSPADM

## 2021-05-17 RX ORDER — SODIUM CHLORIDE 9 MG/ML
INJECTION, SOLUTION INTRAVENOUS ONCE
Status: DISCONTINUED | OUTPATIENT
Start: 2021-05-18 | End: 2021-05-18 | Stop reason: HOSPADM

## 2021-05-17 RX ORDER — HEPARIN SODIUM 1000 [USP'U]/ML
1000 INJECTION, SOLUTION INTRAVENOUS; SUBCUTANEOUS
Status: DISCONTINUED | OUTPATIENT
Start: 2021-05-18 | End: 2021-05-18

## 2021-05-17 RX ORDER — POLYETHYLENE GLYCOL 3350 17 G/17G
17 POWDER, FOR SOLUTION ORAL 2 TIMES DAILY
Refills: 0 | Status: ON HOLD
Start: 2021-05-17 | End: 2021-01-01 | Stop reason: SDUPTHER

## 2021-05-17 RX ORDER — QUETIAPINE FUMARATE 25 MG/1
25 TABLET, FILM COATED ORAL NIGHTLY
Qty: 30 TABLET | Refills: 11 | Status: SHIPPED | OUTPATIENT
Start: 2021-05-17 | End: 2021-05-18

## 2021-05-17 RX ADMIN — ATORVASTATIN CALCIUM 40 MG: 20 TABLET, FILM COATED ORAL at 08:05

## 2021-05-17 RX ADMIN — SEVELAMER CARBONATE 800 MG: 800 TABLET, FILM COATED ORAL at 04:05

## 2021-05-17 RX ADMIN — SEVELAMER CARBONATE 800 MG: 800 TABLET, FILM COATED ORAL at 12:05

## 2021-05-17 RX ADMIN — AMLODIPINE BESYLATE 5 MG: 5 TABLET ORAL at 08:05

## 2021-05-17 RX ADMIN — Medication 24 G: at 06:05

## 2021-05-17 RX ADMIN — CEFTRIAXONE 1 G: 1 INJECTION, POWDER, FOR SOLUTION INTRAMUSCULAR; INTRAVENOUS at 06:05

## 2021-05-17 RX ADMIN — SENNOSIDES 8.6 MG: 8.6 TABLET, FILM COATED ORAL at 08:05

## 2021-05-17 RX ADMIN — AZITHROMYCIN MONOHYDRATE 500 MG: 500 INJECTION, POWDER, LYOPHILIZED, FOR SOLUTION INTRAVENOUS at 08:05

## 2021-05-17 RX ADMIN — ASPIRIN 81 MG: 81 TABLET, COATED ORAL at 08:05

## 2021-05-17 RX ADMIN — DEXTROSE MONOHYDRATE 25 G: 25 INJECTION, SOLUTION INTRAVENOUS at 06:05

## 2021-05-17 RX ADMIN — QUETIAPINE FUMARATE 25 MG: 25 TABLET ORAL at 10:05

## 2021-05-17 RX ADMIN — SEVELAMER CARBONATE 800 MG: 800 TABLET, FILM COATED ORAL at 08:05

## 2021-05-17 RX ADMIN — POLYETHYLENE GLYCOL 3350 17 G: 17 POWDER, FOR SOLUTION ORAL at 08:05

## 2021-05-17 RX ADMIN — METOPROLOL TARTRATE 25 MG: 25 TABLET, FILM COATED ORAL at 08:05

## 2021-05-17 RX ADMIN — CLOPIDOGREL 75 MG: 75 TABLET, FILM COATED ORAL at 08:05

## 2021-05-17 RX ADMIN — SENNOSIDES 8.6 MG: 8.6 TABLET, FILM COATED ORAL at 10:05

## 2021-05-18 VITALS
TEMPERATURE: 98 F | HEIGHT: 66 IN | BODY MASS INDEX: 28.91 KG/M2 | HEART RATE: 104 BPM | WEIGHT: 179.88 LBS | DIASTOLIC BLOOD PRESSURE: 56 MMHG | RESPIRATION RATE: 20 BRPM | SYSTOLIC BLOOD PRESSURE: 127 MMHG | OXYGEN SATURATION: 98 %

## 2021-05-18 LAB
ALBUMIN SERPL BCP-MCNC: 2.7 G/DL (ref 3.5–5.2)
ALP SERPL-CCNC: 91 U/L (ref 55–135)
ALT SERPL W/O P-5'-P-CCNC: 6 U/L (ref 10–44)
ANION GAP SERPL CALC-SCNC: 17 MMOL/L (ref 8–16)
AST SERPL-CCNC: 14 U/L (ref 10–40)
BACTERIA BLD CULT: NORMAL
BACTERIA BLD CULT: NORMAL
BASOPHILS # BLD AUTO: 0.04 K/UL (ref 0–0.2)
BASOPHILS NFR BLD: 0.7 % (ref 0–1.9)
BILIRUB SERPL-MCNC: 0.3 MG/DL (ref 0.1–1)
BUN SERPL-MCNC: 41 MG/DL (ref 8–23)
CALCIUM SERPL-MCNC: 8.8 MG/DL (ref 8.7–10.5)
CHLORIDE SERPL-SCNC: 99 MMOL/L (ref 95–110)
CO2 SERPL-SCNC: 21 MMOL/L (ref 23–29)
CREAT SERPL-MCNC: 9.2 MG/DL (ref 0.5–1.4)
DIFFERENTIAL METHOD: ABNORMAL
EOSINOPHIL # BLD AUTO: 0.3 K/UL (ref 0–0.5)
EOSINOPHIL NFR BLD: 4.3 % (ref 0–8)
ERYTHROCYTE [DISTWIDTH] IN BLOOD BY AUTOMATED COUNT: 14 % (ref 11.5–14.5)
EST. GFR  (AFRICAN AMERICAN): 4.3 ML/MIN/1.73 M^2
EST. GFR  (NON AFRICAN AMERICAN): 3.7 ML/MIN/1.73 M^2
GLUCOSE SERPL-MCNC: 113 MG/DL (ref 70–110)
HCT VFR BLD AUTO: 33.7 % (ref 37–48.5)
HGB BLD-MCNC: 11.3 G/DL (ref 12–16)
IMM GRANULOCYTES # BLD AUTO: 0.03 K/UL (ref 0–0.04)
IMM GRANULOCYTES NFR BLD AUTO: 0.5 % (ref 0–0.5)
LYMPHOCYTES # BLD AUTO: 1 K/UL (ref 1–4.8)
LYMPHOCYTES NFR BLD: 17.5 % (ref 18–48)
MAGNESIUM SERPL-MCNC: 2.3 MG/DL (ref 1.6–2.6)
MCH RBC QN AUTO: 29.5 PG (ref 27–31)
MCHC RBC AUTO-ENTMCNC: 33.5 G/DL (ref 32–36)
MCV RBC AUTO: 88 FL (ref 82–98)
MONOCYTES # BLD AUTO: 0.5 K/UL (ref 0.3–1)
MONOCYTES NFR BLD: 8.7 % (ref 4–15)
NEUTROPHILS # BLD AUTO: 4 K/UL (ref 1.8–7.7)
NEUTROPHILS NFR BLD: 68.3 % (ref 38–73)
NRBC BLD-RTO: 0 /100 WBC
PLATELET # BLD AUTO: 148 K/UL (ref 150–450)
PMV BLD AUTO: 13.3 FL (ref 9.2–12.9)
POCT GLUCOSE: 105 MG/DL (ref 70–110)
POCT GLUCOSE: 124 MG/DL (ref 70–110)
POCT GLUCOSE: 86 MG/DL (ref 70–110)
POTASSIUM SERPL-SCNC: 5.1 MMOL/L (ref 3.5–5.1)
PROT SERPL-MCNC: 6.7 G/DL (ref 6–8.4)
RBC # BLD AUTO: 3.83 M/UL (ref 4–5.4)
SODIUM SERPL-SCNC: 137 MMOL/L (ref 136–145)
WBC # BLD AUTO: 5.87 K/UL (ref 3.9–12.7)

## 2021-05-18 PROCEDURE — 36415 COLL VENOUS BLD VENIPUNCTURE: CPT | Performed by: PHYSICIAN ASSISTANT

## 2021-05-18 PROCEDURE — 80053 COMPREHEN METABOLIC PANEL: CPT | Performed by: PHYSICIAN ASSISTANT

## 2021-05-18 PROCEDURE — 1111F PR DISCHARGE MEDS RECONCILED W/ CURRENT OUTPATIENT MED LIST: ICD-10-PCS | Mod: CPTII,,, | Performed by: INTERNAL MEDICINE

## 2021-05-18 PROCEDURE — 99239 PR HOSPITAL DISCHARGE DAY,>30 MIN: ICD-10-PCS | Mod: ,,, | Performed by: INTERNAL MEDICINE

## 2021-05-18 PROCEDURE — 99232 PR SUBSEQUENT HOSPITAL CARE,LEVL II: ICD-10-PCS | Mod: ,,, | Performed by: INTERNAL MEDICINE

## 2021-05-18 PROCEDURE — 85025 COMPLETE CBC W/AUTO DIFF WBC: CPT | Performed by: PHYSICIAN ASSISTANT

## 2021-05-18 PROCEDURE — 99239 HOSP IP/OBS DSCHRG MGMT >30: CPT | Mod: ,,, | Performed by: INTERNAL MEDICINE

## 2021-05-18 PROCEDURE — 90935 HEMODIALYSIS ONE EVALUATION: CPT

## 2021-05-18 PROCEDURE — 83735 ASSAY OF MAGNESIUM: CPT | Performed by: PHYSICIAN ASSISTANT

## 2021-05-18 PROCEDURE — 1111F DSCHRG MED/CURRENT MED MERGE: CPT | Mod: CPTII,,, | Performed by: INTERNAL MEDICINE

## 2021-05-18 PROCEDURE — 99232 SBSQ HOSP IP/OBS MODERATE 35: CPT | Mod: ,,, | Performed by: INTERNAL MEDICINE

## 2021-05-18 RX ORDER — QUETIAPINE FUMARATE 25 MG/1
25 TABLET, FILM COATED ORAL NIGHTLY
Qty: 30 TABLET | Refills: 11 | Status: ON HOLD | OUTPATIENT
Start: 2021-05-18 | End: 2021-01-01 | Stop reason: SDUPTHER

## 2021-05-24 PROBLEM — F09 MILD COGNITIVE DISORDER: Status: ACTIVE | Noted: 2021-01-01

## 2021-05-24 PROBLEM — F09 MILD COGNITIVE DISORDER: Status: RESOLVED | Noted: 2021-01-01 | Resolved: 2021-01-01

## 2021-05-24 PROBLEM — E87.5 HYPERKALEMIA: Status: ACTIVE | Noted: 2021-01-01

## 2021-05-24 PROBLEM — T82.868A AV GRAFT THROMBOSIS: Status: ACTIVE | Noted: 2021-01-01

## 2021-05-25 PROBLEM — E16.2 HYPOGLYCEMIA: Status: ACTIVE | Noted: 2021-01-01

## 2021-06-11 PROBLEM — T82.590A AV GRAFT MALFUNCTION, INITIAL ENCOUNTER: Status: ACTIVE | Noted: 2021-01-01

## 2021-07-22 PROBLEM — D63.8 ANEMIA OF CHRONIC DISEASE: Status: ACTIVE | Noted: 2019-03-31

## 2021-07-22 PROBLEM — N18.6 END-STAGE RENAL DISEASE ON HEMODIALYSIS: Status: ACTIVE | Noted: 2021-01-01

## 2021-07-22 PROBLEM — Z99.2 END-STAGE RENAL DISEASE ON HEMODIALYSIS: Status: ACTIVE | Noted: 2021-01-01

## 2021-07-23 PROBLEM — G93.40 ACUTE ENCEPHALOPATHY: Status: RESOLVED | Noted: 2021-05-12 | Resolved: 2021-01-01

## 2021-07-23 PROBLEM — E87.70 VOLUME OVERLOAD: Status: RESOLVED | Noted: 2021-05-08 | Resolved: 2021-01-01

## 2021-07-23 PROBLEM — N18.6 ANEMIA DUE TO END STAGE RENAL DISEASE: Chronic | Status: ACTIVE | Noted: 2019-03-31

## 2021-07-23 PROBLEM — K21.9 GERD (GASTROESOPHAGEAL REFLUX DISEASE): Status: ACTIVE | Noted: 2021-01-01

## 2021-07-23 PROBLEM — J18.9 PNEUMONIA: Status: RESOLVED | Noted: 2021-05-12 | Resolved: 2021-01-01

## 2021-07-23 PROBLEM — Z99.2 ENCOUNTER FOR DIALYSIS: Status: RESOLVED | Noted: 2021-05-07 | Resolved: 2021-01-01

## 2021-07-23 PROBLEM — K21.9 GERD (GASTROESOPHAGEAL REFLUX DISEASE): Chronic | Status: ACTIVE | Noted: 2021-01-01

## 2021-07-23 PROBLEM — D63.8 ANEMIA OF CHRONIC DISEASE: Status: RESOLVED | Noted: 2019-03-31 | Resolved: 2021-01-01

## 2021-07-23 PROBLEM — N30.01 ACUTE CYSTITIS WITH HEMATURIA: Status: RESOLVED | Noted: 2021-05-11 | Resolved: 2021-01-01

## 2021-07-23 PROBLEM — R41.0 DELIRIUM: Status: RESOLVED | Noted: 2021-05-12 | Resolved: 2021-01-01

## 2021-07-23 PROBLEM — E87.5 HYPERKALEMIA: Status: RESOLVED | Noted: 2021-01-01 | Resolved: 2021-01-01

## 2021-07-23 PROBLEM — E16.2 HYPOGLYCEMIA: Status: RESOLVED | Noted: 2021-01-01 | Resolved: 2021-01-01

## 2021-07-23 PROBLEM — T82.868A AV GRAFT THROMBOSIS: Chronic | Status: ACTIVE | Noted: 2021-01-01

## 2021-07-23 PROBLEM — I25.2 HISTORY OF MYOCARDIAL INFARCTION: Chronic | Status: ACTIVE | Noted: 2021-01-01

## 2021-09-14 PROBLEM — D62 ACUTE BLOOD LOSS ANEMIA: Status: ACTIVE | Noted: 2021-01-01

## 2021-09-14 PROBLEM — T82.590A AV GRAFT MALFUNCTION, INITIAL ENCOUNTER: Status: RESOLVED | Noted: 2021-01-01 | Resolved: 2021-01-01

## 2021-09-14 PROBLEM — E87.5 HYPERKALEMIA: Status: ACTIVE | Noted: 2021-01-01

## 2021-09-14 PROBLEM — Z91.89: Status: RESOLVED | Noted: 2021-05-12 | Resolved: 2021-01-01

## 2021-09-14 PROBLEM — T82.868A AV GRAFT THROMBOSIS: Chronic | Status: RESOLVED | Noted: 2021-01-01 | Resolved: 2021-01-01

## 2021-09-16 PROBLEM — S42.201A CLOSED FRACTURE OF PROXIMAL END OF RIGHT HUMERUS: Status: ACTIVE | Noted: 2021-01-01

## 2022-01-01 ENCOUNTER — OFFICE VISIT (OUTPATIENT)
Dept: PODIATRY | Facility: CLINIC | Age: 77
End: 2022-01-01
Payer: MEDICARE

## 2022-01-01 ENCOUNTER — HOSPITAL ENCOUNTER (INPATIENT)
Facility: HOSPITAL | Age: 77
LOS: 4 days | DRG: 064 | End: 2022-05-20
Attending: EMERGENCY MEDICINE | Admitting: PSYCHIATRY & NEUROLOGY
Payer: MEDICARE

## 2022-01-01 ENCOUNTER — HOSPITAL ENCOUNTER (INPATIENT)
Facility: HOSPITAL | Age: 77
LOS: 3 days | Discharge: HOME | DRG: 617 | End: 2022-04-02
Attending: EMERGENCY MEDICINE | Admitting: EMERGENCY MEDICINE
Payer: MEDICARE

## 2022-01-01 ENCOUNTER — ANESTHESIA EVENT (OUTPATIENT)
Dept: SURGERY | Facility: HOSPITAL | Age: 77
DRG: 617 | End: 2022-01-01
Payer: MEDICARE

## 2022-01-01 ENCOUNTER — TELEPHONE (OUTPATIENT)
Dept: PODIATRY | Facility: CLINIC | Age: 77
End: 2022-01-01
Payer: MEDICARE

## 2022-01-01 ENCOUNTER — ANESTHESIA (OUTPATIENT)
Dept: SURGERY | Facility: HOSPITAL | Age: 77
DRG: 617 | End: 2022-01-01
Payer: MEDICARE

## 2022-01-01 VITALS
HEART RATE: 77 BPM | DIASTOLIC BLOOD PRESSURE: 60 MMHG | HEIGHT: 66 IN | DIASTOLIC BLOOD PRESSURE: 60 MMHG | RESPIRATION RATE: 14 BRPM | OXYGEN SATURATION: 98 % | SYSTOLIC BLOOD PRESSURE: 142 MMHG | BODY MASS INDEX: 24.52 KG/M2 | WEIGHT: 152.56 LBS | TEMPERATURE: 99 F | BODY MASS INDEX: 24.52 KG/M2 | HEART RATE: 69 BPM | WEIGHT: 152.56 LBS | SYSTOLIC BLOOD PRESSURE: 131 MMHG | HEIGHT: 66 IN

## 2022-01-01 VITALS
OXYGEN SATURATION: 89 % | BODY MASS INDEX: 27.32 KG/M2 | DIASTOLIC BLOOD PRESSURE: 50 MMHG | HEIGHT: 66 IN | WEIGHT: 170 LBS | SYSTOLIC BLOOD PRESSURE: 95 MMHG | RESPIRATION RATE: 5 BRPM | TEMPERATURE: 94 F | HEART RATE: 77 BPM

## 2022-01-01 DIAGNOSIS — M79.673 FOOT PAIN: ICD-10-CM

## 2022-01-01 DIAGNOSIS — E87.5 HYPERKALEMIA: ICD-10-CM

## 2022-01-01 DIAGNOSIS — E11.21 TYPE 2 DIABETES MELLITUS WITH DIABETIC NEPHROPATHY, WITH LONG-TERM CURRENT USE OF INSULIN: Primary | ICD-10-CM

## 2022-01-01 DIAGNOSIS — M86.9 OSTEOMYELITIS OF GREAT TOE OF LEFT FOOT: Primary | ICD-10-CM

## 2022-01-01 DIAGNOSIS — R40.4 ALTERED CONSCIOUSNESS: ICD-10-CM

## 2022-01-01 DIAGNOSIS — I61.5 IVH (INTRAVENTRICULAR HEMORRHAGE): ICD-10-CM

## 2022-01-01 DIAGNOSIS — R07.9 CHEST PAIN: ICD-10-CM

## 2022-01-01 DIAGNOSIS — S98.112A AMPUTATION OF LEFT GREAT TOE: ICD-10-CM

## 2022-01-01 DIAGNOSIS — M86.072 ACUTE HEMATOGENOUS OSTEOMYELITIS OF LEFT FOOT: ICD-10-CM

## 2022-01-01 DIAGNOSIS — R73.9 HYPERGLYCEMIA: Primary | ICD-10-CM

## 2022-01-01 DIAGNOSIS — L03.032 CELLULITIS OF TOE OF LEFT FOOT: ICD-10-CM

## 2022-01-01 DIAGNOSIS — A41.9 SEPSIS, DUE TO UNSPECIFIED ORGANISM, UNSPECIFIED WHETHER ACUTE ORGAN DYSFUNCTION PRESENT: ICD-10-CM

## 2022-01-01 DIAGNOSIS — Z79.4 TYPE 2 DIABETES MELLITUS WITH DIABETIC NEPHROPATHY, WITH LONG-TERM CURRENT USE OF INSULIN: Primary | ICD-10-CM

## 2022-01-01 LAB
ABO + RH BLD: NORMAL
ALBUMIN SERPL BCP-MCNC: 2.6 G/DL (ref 3.5–5.2)
ALBUMIN SERPL BCP-MCNC: 2.8 G/DL (ref 3.5–5.2)
ALBUMIN SERPL BCP-MCNC: 3.3 G/DL (ref 3.5–5.2)
ALLENS TEST: ABNORMAL
ALLENS TEST: ABNORMAL
ALP SERPL-CCNC: 79 U/L (ref 55–135)
ALP SERPL-CCNC: 80 U/L (ref 55–135)
ALP SERPL-CCNC: 99 U/L (ref 55–135)
ALT SERPL W/O P-5'-P-CCNC: <5 U/L (ref 10–44)
ANION GAP SERPL CALC-SCNC: 10 MMOL/L (ref 8–16)
ANION GAP SERPL CALC-SCNC: 10 MMOL/L (ref 8–16)
ANION GAP SERPL CALC-SCNC: 11 MMOL/L (ref 8–16)
ANION GAP SERPL CALC-SCNC: 11 MMOL/L (ref 8–16)
ANION GAP SERPL CALC-SCNC: 13 MMOL/L (ref 8–16)
ANION GAP SERPL CALC-SCNC: 20 MMOL/L (ref 8–16)
ANION GAP SERPL CALC-SCNC: 23 MMOL/L (ref 8–16)
ANION GAP SERPL CALC-SCNC: 9 MMOL/L (ref 8–16)
ANISOCYTOSIS BLD QL SMEAR: SLIGHT
ANISOCYTOSIS BLD QL SMEAR: SLIGHT
APTT BLDCRRT: 31.1 SEC (ref 21–32)
AST SERPL-CCNC: 13 U/L (ref 10–40)
AST SERPL-CCNC: 13 U/L (ref 10–40)
AST SERPL-CCNC: 7 U/L (ref 10–40)
B-OH-BUTYR BLD STRIP-SCNC: 0.4 MMOL/L (ref 0–0.5)
BACTERIA #/AREA URNS AUTO: ABNORMAL /HPF
BACTERIA BLD CULT: ABNORMAL
BACTERIA BLD CULT: NORMAL
BACTERIA BLD CULT: NORMAL
BACTERIA SPEC AEROBE CULT: ABNORMAL
BACTERIA SPEC AEROBE CULT: ABNORMAL
BACTERIA SPEC ANAEROBE CULT: ABNORMAL
BACTERIA SPEC ANAEROBE CULT: ABNORMAL
BACTERIA SPEC ANAEROBE CULT: NORMAL
BACTERIA UR CULT: NO GROWTH
BASOPHILS # BLD AUTO: 0.02 K/UL (ref 0–0.2)
BASOPHILS # BLD AUTO: 0.03 K/UL (ref 0–0.2)
BASOPHILS # BLD AUTO: 0.04 K/UL (ref 0–0.2)
BASOPHILS # BLD AUTO: ABNORMAL K/UL (ref 0–0.2)
BASOPHILS NFR BLD: 0 % (ref 0–1.9)
BASOPHILS NFR BLD: 0.3 % (ref 0–1.9)
BASOPHILS NFR BLD: 0.3 % (ref 0–1.9)
BASOPHILS NFR BLD: 0.4 % (ref 0–1.9)
BASOPHILS NFR BLD: 0.6 % (ref 0–1.9)
BASOPHILS NFR BLD: 0.7 % (ref 0–1.9)
BILIRUB SERPL-MCNC: 0.4 MG/DL (ref 0.1–1)
BILIRUB SERPL-MCNC: 0.7 MG/DL (ref 0.1–1)
BILIRUB SERPL-MCNC: 0.9 MG/DL (ref 0.1–1)
BILIRUB UR QL STRIP: NEGATIVE
BLD GP AB SCN CELLS X3 SERPL QL: NORMAL
BNP SERPL-MCNC: 1790 PG/ML (ref 0–99)
BUN SERPL-MCNC: 14 MG/DL (ref 8–23)
BUN SERPL-MCNC: 15 MG/DL (ref 8–23)
BUN SERPL-MCNC: 21 MG/DL (ref 8–23)
BUN SERPL-MCNC: 23 MG/DL (ref 8–23)
BUN SERPL-MCNC: 25 MG/DL (ref 8–23)
BUN SERPL-MCNC: 34 MG/DL (ref 8–23)
BUN SERPL-MCNC: 47 MG/DL (ref 8–23)
BUN SERPL-MCNC: 57 MG/DL (ref 8–23)
CALCIUM SERPL-MCNC: 10 MG/DL (ref 8.7–10.5)
CALCIUM SERPL-MCNC: 8.4 MG/DL (ref 8.7–10.5)
CALCIUM SERPL-MCNC: 8.9 MG/DL (ref 8.7–10.5)
CALCIUM SERPL-MCNC: 9.1 MG/DL (ref 8.7–10.5)
CALCIUM SERPL-MCNC: 9.3 MG/DL (ref 8.7–10.5)
CALCIUM SERPL-MCNC: 9.3 MG/DL (ref 8.7–10.5)
CALCIUM SERPL-MCNC: 9.6 MG/DL (ref 8.7–10.5)
CALCIUM SERPL-MCNC: 9.6 MG/DL (ref 8.7–10.5)
CHLORIDE SERPL-SCNC: 101 MMOL/L (ref 95–110)
CHLORIDE SERPL-SCNC: 102 MMOL/L (ref 95–110)
CHLORIDE SERPL-SCNC: 105 MMOL/L (ref 95–110)
CHLORIDE SERPL-SCNC: 106 MMOL/L (ref 95–110)
CHLORIDE SERPL-SCNC: 106 MMOL/L (ref 95–110)
CHLORIDE SERPL-SCNC: 98 MMOL/L (ref 95–110)
CHLORIDE SERPL-SCNC: 99 MMOL/L (ref 95–110)
CHLORIDE SERPL-SCNC: 99 MMOL/L (ref 95–110)
CHOLEST SERPL-MCNC: 148 MG/DL (ref 120–199)
CHOLEST/HDLC SERPL: 3.4 {RATIO} (ref 2–5)
CLARITY UR REFRACT.AUTO: ABNORMAL
CO2 SERPL-SCNC: 20 MMOL/L (ref 23–29)
CO2 SERPL-SCNC: 22 MMOL/L (ref 23–29)
CO2 SERPL-SCNC: 24 MMOL/L (ref 23–29)
CO2 SERPL-SCNC: 25 MMOL/L (ref 23–29)
CO2 SERPL-SCNC: 25 MMOL/L (ref 23–29)
CO2 SERPL-SCNC: 28 MMOL/L (ref 23–29)
CO2 SERPL-SCNC: 31 MMOL/L (ref 23–29)
CO2 SERPL-SCNC: 31 MMOL/L (ref 23–29)
COLOR UR AUTO: YELLOW
CREAT SERPL-MCNC: 3.9 MG/DL (ref 0.5–1.4)
CREAT SERPL-MCNC: 4.4 MG/DL (ref 0.5–1.4)
CREAT SERPL-MCNC: 4.7 MG/DL (ref 0.5–1.4)
CREAT SERPL-MCNC: 5 MG/DL (ref 0.5–1.4)
CREAT SERPL-MCNC: 5.8 MG/DL (ref 0.5–1.4)
CREAT SERPL-MCNC: 6.8 MG/DL (ref 0.5–1.4)
CREAT SERPL-MCNC: 7.9 MG/DL (ref 0.5–1.4)
CREAT SERPL-MCNC: 8.6 MG/DL (ref 0.5–1.4)
CRP SERPL-MCNC: 101.3 MG/L (ref 0–8.2)
CTP QC/QA: YES
CTP QC/QA: YES
DELSYS: ABNORMAL
DIFFERENTIAL METHOD: ABNORMAL
EOSINOPHIL # BLD AUTO: 0 K/UL (ref 0–0.5)
EOSINOPHIL # BLD AUTO: 0.2 K/UL (ref 0–0.5)
EOSINOPHIL # BLD AUTO: 0.3 K/UL (ref 0–0.5)
EOSINOPHIL # BLD AUTO: 0.3 K/UL (ref 0–0.5)
EOSINOPHIL # BLD AUTO: ABNORMAL K/UL (ref 0–0.5)
EOSINOPHIL NFR BLD: 0 % (ref 0–8)
EOSINOPHIL NFR BLD: 0 % (ref 0–8)
EOSINOPHIL NFR BLD: 2.5 % (ref 0–8)
EOSINOPHIL NFR BLD: 2.9 % (ref 0–8)
EOSINOPHIL NFR BLD: 3.9 % (ref 0–8)
EOSINOPHIL NFR BLD: 4.2 % (ref 0–8)
EOSINOPHIL NFR BLD: 5.2 % (ref 0–8)
EOSINOPHIL NFR BLD: 5.3 % (ref 0–8)
ERYTHROCYTE [DISTWIDTH] IN BLOOD BY AUTOMATED COUNT: 14.1 % (ref 11.5–14.5)
ERYTHROCYTE [DISTWIDTH] IN BLOOD BY AUTOMATED COUNT: 14.1 % (ref 11.5–14.5)
ERYTHROCYTE [DISTWIDTH] IN BLOOD BY AUTOMATED COUNT: 14.3 % (ref 11.5–14.5)
ERYTHROCYTE [DISTWIDTH] IN BLOOD BY AUTOMATED COUNT: 14.3 % (ref 11.5–14.5)
ERYTHROCYTE [DISTWIDTH] IN BLOOD BY AUTOMATED COUNT: 14.4 % (ref 11.5–14.5)
ERYTHROCYTE [DISTWIDTH] IN BLOOD BY AUTOMATED COUNT: 14.4 % (ref 11.5–14.5)
ERYTHROCYTE [DISTWIDTH] IN BLOOD BY AUTOMATED COUNT: 17.9 % (ref 11.5–14.5)
ERYTHROCYTE [DISTWIDTH] IN BLOOD BY AUTOMATED COUNT: 18.2 % (ref 11.5–14.5)
ERYTHROCYTE [SEDIMENTATION RATE] IN BLOOD BY WESTERGREN METHOD: 15 MM/H
ERYTHROCYTE [SEDIMENTATION RATE] IN BLOOD BY WESTERGREN METHOD: 76 MM/HR (ref 0–36)
EST. GFR  (AFRICAN AMERICAN): 10.4 ML/MIN/1.73 M^2
EST. GFR  (AFRICAN AMERICAN): 12 ML/MIN/1.73 M^2
EST. GFR  (AFRICAN AMERICAN): 4.6 ML/MIN/1.73 M^2
EST. GFR  (AFRICAN AMERICAN): 5.1 ML/MIN/1.73 M^2
EST. GFR  (AFRICAN AMERICAN): 6.1 ML/MIN/1.73 M^2
EST. GFR  (AFRICAN AMERICAN): 7.4 ML/MIN/1.73 M^2
EST. GFR  (AFRICAN AMERICAN): 8.9 ML/MIN/1.73 M^2
EST. GFR  (AFRICAN AMERICAN): 9.6 ML/MIN/1.73 M^2
EST. GFR  (NON AFRICAN AMERICAN): 10.4 ML/MIN/1.73 M^2
EST. GFR  (NON AFRICAN AMERICAN): 4 ML/MIN/1.73 M^2
EST. GFR  (NON AFRICAN AMERICAN): 4.4 ML/MIN/1.73 M^2
EST. GFR  (NON AFRICAN AMERICAN): 5.3 ML/MIN/1.73 M^2
EST. GFR  (NON AFRICAN AMERICAN): 6.5 ML/MIN/1.73 M^2
EST. GFR  (NON AFRICAN AMERICAN): 7.7 ML/MIN/1.73 M^2
EST. GFR  (NON AFRICAN AMERICAN): 8.3 ML/MIN/1.73 M^2
EST. GFR  (NON AFRICAN AMERICAN): 9 ML/MIN/1.73 M^2
ESTIMATED AVG GLUCOSE: 120 MG/DL (ref 68–131)
ESTIMATED AVG GLUCOSE: 140 MG/DL (ref 68–131)
FERRITIN SERPL-MCNC: 743 NG/ML (ref 20–300)
FINAL PATHOLOGIC DIAGNOSIS: NORMAL
FIO2: 70
FUNGUS SPEC CULT: NORMAL
GIANT PLATELETS BLD QL SMEAR: PRESENT
GLUCOSE SERPL-MCNC: 129 MG/DL (ref 70–110)
GLUCOSE SERPL-MCNC: 132 MG/DL (ref 70–110)
GLUCOSE SERPL-MCNC: 147 MG/DL (ref 70–110)
GLUCOSE SERPL-MCNC: 194 MG/DL (ref 70–110)
GLUCOSE SERPL-MCNC: 221 MG/DL (ref 70–110)
GLUCOSE SERPL-MCNC: 236 MG/DL (ref 70–110)
GLUCOSE SERPL-MCNC: 301 MG/DL (ref 70–110)
GLUCOSE SERPL-MCNC: 56 MG/DL (ref 70–110)
GLUCOSE UR QL STRIP: ABNORMAL
GRAM STN SPEC: NORMAL
HAV IGM SERPL QL IA: NEGATIVE
HBA1C MFR BLD: 5.8 % (ref 4–5.6)
HBA1C MFR BLD: 6.5 % (ref 4–5.6)
HBV CORE IGM SERPL QL IA: NEGATIVE
HBV SURFACE AG SERPL QL IA: NEGATIVE
HCO3 UR-SCNC: 22.5 MMOL/L (ref 24–28)
HCO3 UR-SCNC: 28.2 MMOL/L (ref 24–28)
HCT VFR BLD AUTO: 21.4 % (ref 37–48.5)
HCT VFR BLD AUTO: 22.1 % (ref 37–48.5)
HCT VFR BLD AUTO: 22.1 % (ref 37–48.5)
HCT VFR BLD AUTO: 22.3 % (ref 37–48.5)
HCT VFR BLD AUTO: 22.6 % (ref 37–48.5)
HCT VFR BLD AUTO: 23.2 % (ref 37–48.5)
HCT VFR BLD AUTO: 39.2 % (ref 37–48.5)
HCT VFR BLD AUTO: 44.5 % (ref 37–48.5)
HCV AB SERPL QL IA: NEGATIVE
HDLC SERPL-MCNC: 43 MG/DL (ref 40–75)
HDLC SERPL: 29.1 % (ref 20–50)
HGB BLD-MCNC: 12.2 G/DL (ref 12–16)
HGB BLD-MCNC: 13.1 G/DL (ref 12–16)
HGB BLD-MCNC: 6.6 G/DL (ref 12–16)
HGB BLD-MCNC: 6.7 G/DL (ref 12–16)
HGB BLD-MCNC: 6.8 G/DL (ref 12–16)
HGB BLD-MCNC: 6.9 G/DL (ref 12–16)
HGB BLD-MCNC: 7.1 G/DL (ref 12–16)
HGB BLD-MCNC: 7.1 G/DL (ref 12–16)
HGB UR QL STRIP: ABNORMAL
HYALINE CASTS UR QL AUTO: 0 /LPF
HYPOCHROMIA BLD QL SMEAR: ABNORMAL
IMM GRANULOCYTES # BLD AUTO: 0.02 K/UL (ref 0–0.04)
IMM GRANULOCYTES # BLD AUTO: 0.03 K/UL (ref 0–0.04)
IMM GRANULOCYTES # BLD AUTO: 0.04 K/UL (ref 0–0.04)
IMM GRANULOCYTES # BLD AUTO: 0.06 K/UL (ref 0–0.04)
IMM GRANULOCYTES # BLD AUTO: 0.06 K/UL (ref 0–0.04)
IMM GRANULOCYTES # BLD AUTO: ABNORMAL K/UL (ref 0–0.04)
IMM GRANULOCYTES NFR BLD AUTO: 0.3 % (ref 0–0.5)
IMM GRANULOCYTES NFR BLD AUTO: 0.4 % (ref 0–0.5)
IMM GRANULOCYTES NFR BLD AUTO: 0.4 % (ref 0–0.5)
IMM GRANULOCYTES NFR BLD AUTO: 0.6 % (ref 0–0.5)
IMM GRANULOCYTES NFR BLD AUTO: 0.7 % (ref 0–0.5)
IMM GRANULOCYTES NFR BLD AUTO: 0.8 % (ref 0–0.5)
IMM GRANULOCYTES NFR BLD AUTO: 1.1 % (ref 0–0.5)
IMM GRANULOCYTES NFR BLD AUTO: ABNORMAL % (ref 0–0.5)
INR PPP: 1.2 (ref 0.8–1.2)
INR PPP: 1.4 (ref 0.8–1.2)
IRON SERPL-MCNC: 39 UG/DL (ref 30–160)
KETONES UR QL STRIP: NEGATIVE
LACTATE SERPL-SCNC: 11.1 MMOL/L (ref 0.5–2.2)
LACTATE SERPL-SCNC: 2.9 MMOL/L (ref 0.5–2.2)
LACTATE SERPL-SCNC: 3.5 MMOL/L (ref 0.5–2.2)
LDLC SERPL CALC-MCNC: 89.6 MG/DL (ref 63–159)
LEUKOCYTE ESTERASE UR QL STRIP: ABNORMAL
LYMPHOCYTES # BLD AUTO: 0.4 K/UL (ref 1–4.8)
LYMPHOCYTES # BLD AUTO: 0.7 K/UL (ref 1–4.8)
LYMPHOCYTES # BLD AUTO: 1 K/UL (ref 1–4.8)
LYMPHOCYTES # BLD AUTO: 1.2 K/UL (ref 1–4.8)
LYMPHOCYTES # BLD AUTO: ABNORMAL K/UL (ref 1–4.8)
LYMPHOCYTES NFR BLD: 12.5 % (ref 18–48)
LYMPHOCYTES NFR BLD: 13.9 % (ref 18–48)
LYMPHOCYTES NFR BLD: 17.9 % (ref 18–48)
LYMPHOCYTES NFR BLD: 18 % (ref 18–48)
LYMPHOCYTES NFR BLD: 19 % (ref 18–48)
LYMPHOCYTES NFR BLD: 19 % (ref 18–48)
LYMPHOCYTES NFR BLD: 21.9 % (ref 18–48)
LYMPHOCYTES NFR BLD: 6.2 % (ref 18–48)
Lab: NORMAL
MAGNESIUM SERPL-MCNC: 1.9 MG/DL (ref 1.6–2.6)
MAGNESIUM SERPL-MCNC: 2 MG/DL (ref 1.6–2.6)
MAGNESIUM SERPL-MCNC: 2.3 MG/DL (ref 1.6–2.6)
MCH RBC QN AUTO: 28.1 PG (ref 27–31)
MCH RBC QN AUTO: 28.2 PG (ref 27–31)
MCH RBC QN AUTO: 28.3 PG (ref 27–31)
MCH RBC QN AUTO: 28.5 PG (ref 27–31)
MCH RBC QN AUTO: 28.9 PG (ref 27–31)
MCH RBC QN AUTO: 29.2 PG (ref 27–31)
MCHC RBC AUTO-ENTMCNC: 29.4 G/DL (ref 32–36)
MCHC RBC AUTO-ENTMCNC: 30.3 G/DL (ref 32–36)
MCHC RBC AUTO-ENTMCNC: 30.6 G/DL (ref 32–36)
MCHC RBC AUTO-ENTMCNC: 30.8 G/DL (ref 32–36)
MCHC RBC AUTO-ENTMCNC: 30.8 G/DL (ref 32–36)
MCHC RBC AUTO-ENTMCNC: 30.9 G/DL (ref 32–36)
MCHC RBC AUTO-ENTMCNC: 31.1 G/DL (ref 32–36)
MCHC RBC AUTO-ENTMCNC: 31.4 G/DL (ref 32–36)
MCV RBC AUTO: 90 FL (ref 82–98)
MCV RBC AUTO: 91 FL (ref 82–98)
MCV RBC AUTO: 91 FL (ref 82–98)
MCV RBC AUTO: 92 FL (ref 82–98)
MCV RBC AUTO: 93 FL (ref 82–98)
MCV RBC AUTO: 93 FL (ref 82–98)
MCV RBC AUTO: 95 FL (ref 82–98)
MCV RBC AUTO: 97 FL (ref 82–98)
METAMYELOCYTES NFR BLD MANUAL: 2 %
MICROSCOPIC COMMENT: ABNORMAL
MODE: ABNORMAL
MONOCYTES # BLD AUTO: 0.5 K/UL (ref 0.3–1)
MONOCYTES # BLD AUTO: 0.6 K/UL (ref 0.3–1)
MONOCYTES # BLD AUTO: 0.7 K/UL (ref 0.3–1)
MONOCYTES # BLD AUTO: 0.7 K/UL (ref 0.3–1)
MONOCYTES # BLD AUTO: ABNORMAL K/UL (ref 0.3–1)
MONOCYTES NFR BLD: 10.3 % (ref 4–15)
MONOCYTES NFR BLD: 11.5 % (ref 4–15)
MONOCYTES NFR BLD: 2 % (ref 4–15)
MONOCYTES NFR BLD: 8.1 % (ref 4–15)
MONOCYTES NFR BLD: 8.5 % (ref 4–15)
MONOCYTES NFR BLD: 9.8 % (ref 4–15)
MONOCYTES NFR BLD: 9.8 % (ref 4–15)
MONOCYTES NFR BLD: 9.9 % (ref 4–15)
NEUTROPHILS # BLD AUTO: 3.3 K/UL (ref 1.8–7.7)
NEUTROPHILS # BLD AUTO: 3.3 K/UL (ref 1.8–7.7)
NEUTROPHILS # BLD AUTO: 3.6 K/UL (ref 1.8–7.7)
NEUTROPHILS # BLD AUTO: 3.6 K/UL (ref 1.8–7.7)
NEUTROPHILS # BLD AUTO: 4.4 K/UL (ref 1.8–7.7)
NEUTROPHILS # BLD AUTO: 4.9 K/UL (ref 1.8–7.7)
NEUTROPHILS # BLD AUTO: 5.3 K/UL (ref 1.8–7.7)
NEUTROPHILS NFR BLD: 61 % (ref 38–73)
NEUTROPHILS NFR BLD: 61.9 % (ref 38–73)
NEUTROPHILS NFR BLD: 65.2 % (ref 38–73)
NEUTROPHILS NFR BLD: 67.1 % (ref 38–73)
NEUTROPHILS NFR BLD: 67.6 % (ref 38–73)
NEUTROPHILS NFR BLD: 72.7 % (ref 38–73)
NEUTROPHILS NFR BLD: 75.9 % (ref 38–73)
NEUTROPHILS NFR BLD: 80.9 % (ref 38–73)
NEUTS BAND NFR BLD MANUAL: 16 %
NITRITE UR QL STRIP: NEGATIVE
NONHDLC SERPL-MCNC: 105 MG/DL
NRBC BLD-RTO: 0 /100 WBC
OVALOCYTES BLD QL SMEAR: ABNORMAL
PCO2 BLDA: 43.4 MMHG (ref 35–45)
PCO2 BLDA: 71.4 MMHG (ref 35–45)
PEEP: 5
PH SMN: 7.11 [PH] (ref 7.35–7.45)
PH SMN: 7.42 [PH] (ref 7.35–7.45)
PH UR STRIP: 7 [PH] (ref 5–8)
PHOSPHATE SERPL-MCNC: 2.8 MG/DL (ref 2.7–4.5)
PHOSPHATE SERPL-MCNC: 3.8 MG/DL (ref 2.7–4.5)
PHOSPHATE SERPL-MCNC: 4.3 MG/DL (ref 2.7–4.5)
PHOSPHATE SERPL-MCNC: 4.8 MG/DL (ref 2.7–4.5)
PHOSPHATE SERPL-MCNC: 5.5 MG/DL (ref 2.7–4.5)
PHOSPHATE SERPL-MCNC: 6.1 MG/DL (ref 2.7–4.5)
PIP: 23
PLATELET # BLD AUTO: 155 K/UL (ref 150–450)
PLATELET # BLD AUTO: 157 K/UL (ref 150–450)
PLATELET # BLD AUTO: 164 K/UL (ref 150–450)
PLATELET # BLD AUTO: 170 K/UL (ref 150–450)
PLATELET # BLD AUTO: 181 K/UL (ref 150–450)
PLATELET # BLD AUTO: 190 K/UL (ref 150–450)
PLATELET # BLD AUTO: 201 K/UL (ref 150–450)
PLATELET # BLD AUTO: 226 K/UL (ref 150–450)
PLATELET BLD QL SMEAR: ABNORMAL
PLATELET BLD QL SMEAR: ABNORMAL
PMV BLD AUTO: 12.5 FL (ref 9.2–12.9)
PMV BLD AUTO: 12.7 FL (ref 9.2–12.9)
PMV BLD AUTO: 13 FL (ref 9.2–12.9)
PMV BLD AUTO: 13 FL (ref 9.2–12.9)
PMV BLD AUTO: 13.1 FL (ref 9.2–12.9)
PMV BLD AUTO: 13.2 FL (ref 9.2–12.9)
PMV BLD AUTO: 13.3 FL (ref 9.2–12.9)
PMV BLD AUTO: 13.4 FL (ref 9.2–12.9)
PO2 BLDA: 28 MMHG (ref 40–60)
PO2 BLDA: 35 MMHG (ref 40–60)
POC BE: -7 MMOL/L
POC BE: 4 MMOL/L
POC SATURATED O2: 33 % (ref 95–100)
POC SATURATED O2: 69 % (ref 95–100)
POC TCO2: 25 MMOL/L (ref 24–29)
POC TCO2: 30 MMOL/L (ref 24–29)
POCT GLUCOSE: 112 MG/DL (ref 70–110)
POCT GLUCOSE: 119 MG/DL (ref 70–110)
POCT GLUCOSE: 122 MG/DL (ref 70–110)
POCT GLUCOSE: 123 MG/DL (ref 70–110)
POCT GLUCOSE: 133 MG/DL (ref 70–110)
POCT GLUCOSE: 134 MG/DL (ref 70–110)
POCT GLUCOSE: 138 MG/DL (ref 70–110)
POCT GLUCOSE: 142 MG/DL (ref 70–110)
POCT GLUCOSE: 143 MG/DL (ref 70–110)
POCT GLUCOSE: 147 MG/DL (ref 70–110)
POCT GLUCOSE: 150 MG/DL (ref 70–110)
POCT GLUCOSE: 151 MG/DL (ref 70–110)
POCT GLUCOSE: 153 MG/DL (ref 70–110)
POCT GLUCOSE: 158 MG/DL (ref 70–110)
POCT GLUCOSE: 159 MG/DL (ref 70–110)
POCT GLUCOSE: 163 MG/DL (ref 70–110)
POCT GLUCOSE: 164 MG/DL (ref 70–110)
POCT GLUCOSE: 177 MG/DL (ref 70–110)
POCT GLUCOSE: 180 MG/DL (ref 70–110)
POCT GLUCOSE: 180 MG/DL (ref 70–110)
POCT GLUCOSE: 184 MG/DL (ref 70–110)
POCT GLUCOSE: 199 MG/DL (ref 70–110)
POCT GLUCOSE: 200 MG/DL (ref 70–110)
POCT GLUCOSE: 209 MG/DL (ref 70–110)
POCT GLUCOSE: 211 MG/DL (ref 70–110)
POCT GLUCOSE: 218 MG/DL (ref 70–110)
POCT GLUCOSE: 221 MG/DL (ref 70–110)
POCT GLUCOSE: 233 MG/DL (ref 70–110)
POCT GLUCOSE: 236 MG/DL (ref 70–110)
POCT GLUCOSE: 254 MG/DL (ref 70–110)
POCT GLUCOSE: 257 MG/DL (ref 70–110)
POCT GLUCOSE: 261 MG/DL (ref 70–110)
POCT GLUCOSE: 272 MG/DL (ref 70–110)
POCT GLUCOSE: 280 MG/DL (ref 70–110)
POCT GLUCOSE: 297 MG/DL (ref 70–110)
POCT GLUCOSE: 308 MG/DL (ref 70–110)
POCT GLUCOSE: 329 MG/DL (ref 70–110)
POCT GLUCOSE: 335 MG/DL (ref 70–110)
POCT GLUCOSE: 352 MG/DL (ref 70–110)
POCT GLUCOSE: 392 MG/DL (ref 70–110)
POCT GLUCOSE: 420 MG/DL (ref 70–110)
POCT GLUCOSE: 54 MG/DL (ref 70–110)
POIKILOCYTOSIS BLD QL SMEAR: SLIGHT
POLYCHROMASIA BLD QL SMEAR: ABNORMAL
POTASSIUM SERPL-SCNC: 4 MMOL/L (ref 3.5–5.1)
POTASSIUM SERPL-SCNC: 4.1 MMOL/L (ref 3.5–5.1)
POTASSIUM SERPL-SCNC: 4.2 MMOL/L (ref 3.5–5.1)
POTASSIUM SERPL-SCNC: 4.3 MMOL/L (ref 3.5–5.1)
POTASSIUM SERPL-SCNC: 4.5 MMOL/L (ref 3.5–5.1)
POTASSIUM SERPL-SCNC: 4.7 MMOL/L (ref 3.5–5.1)
POTASSIUM SERPL-SCNC: 4.9 MMOL/L (ref 3.5–5.1)
POTASSIUM SERPL-SCNC: 5.3 MMOL/L (ref 3.5–5.1)
PROT SERPL-MCNC: 6.3 G/DL (ref 6–8.4)
PROT SERPL-MCNC: 6.5 G/DL (ref 6–8.4)
PROT SERPL-MCNC: 7.4 G/DL (ref 6–8.4)
PROT UR QL STRIP: ABNORMAL
PROTHROMBIN TIME: 12.2 SEC (ref 9–12.5)
PROTHROMBIN TIME: 14.6 SEC (ref 9–12.5)
RBC # BLD AUTO: 2.35 M/UL (ref 4–5.4)
RBC # BLD AUTO: 2.36 M/UL (ref 4–5.4)
RBC # BLD AUTO: 2.38 M/UL (ref 4–5.4)
RBC # BLD AUTO: 2.42 M/UL (ref 4–5.4)
RBC # BLD AUTO: 2.51 M/UL (ref 4–5.4)
RBC # BLD AUTO: 2.53 M/UL (ref 4–5.4)
RBC # BLD AUTO: 4.22 M/UL (ref 4–5.4)
RBC # BLD AUTO: 4.6 M/UL (ref 4–5.4)
RBC #/AREA URNS AUTO: 36 /HPF (ref 0–4)
SAMPLE: ABNORMAL
SAMPLE: ABNORMAL
SARS-COV-2 RDRP RESP QL NAA+PROBE: NEGATIVE
SARS-COV-2 RDRP RESP QL NAA+PROBE: NEGATIVE
SARS-COV-2 RNA RESP QL NAA+PROBE: NOT DETECTED
SARS-COV-2- CYCLE NUMBER: NORMAL
SATURATED IRON: 30 % (ref 20–50)
SCHISTOCYTES BLD QL SMEAR: ABNORMAL
SCHISTOCYTES BLD QL SMEAR: PRESENT
SITE: ABNORMAL
SITE: ABNORMAL
SODIUM SERPL-SCNC: 140 MMOL/L (ref 136–145)
SODIUM SERPL-SCNC: 141 MMOL/L (ref 136–145)
SODIUM SERPL-SCNC: 144 MMOL/L (ref 136–145)
SODIUM SERPL-SCNC: 144 MMOL/L (ref 136–145)
SP GR UR STRIP: 1.01 (ref 1–1.03)
SP02: 100
SQUAMOUS #/AREA URNS AUTO: 6 /HPF
TOTAL IRON BINDING CAPACITY: 132 UG/DL (ref 250–450)
TRANSFERRIN SERPL-MCNC: 89 MG/DL (ref 200–375)
TRIGL SERPL-MCNC: 77 MG/DL (ref 30–150)
TROPONIN I SERPL DL<=0.01 NG/ML-MCNC: 0.03 NG/ML (ref 0–0.03)
TROPONIN I SERPL DL<=0.01 NG/ML-MCNC: 0.03 NG/ML (ref 0–0.03)
TROPONIN I SERPL DL<=0.01 NG/ML-MCNC: 0.25 NG/ML (ref 0–0.03)
TROPONIN I SERPL DL<=0.01 NG/ML-MCNC: 0.27 NG/ML (ref 0–0.03)
TROPONIN I SERPL DL<=0.01 NG/ML-MCNC: 0.29 NG/ML (ref 0–0.03)
TSH SERPL DL<=0.005 MIU/L-ACNC: 1.39 UIU/ML (ref 0.4–4)
URN SPEC COLLECT METH UR: ABNORMAL
VANCOMYCIN SERPL-MCNC: 11.7 UG/ML
VANCOMYCIN SERPL-MCNC: 14.9 UG/ML
VANCOMYCIN SERPL-MCNC: 16.9 UG/ML
VT: 400
WBC # BLD AUTO: 18.85 K/UL (ref 3.9–12.7)
WBC # BLD AUTO: 5.01 K/UL (ref 3.9–12.7)
WBC # BLD AUTO: 5.25 K/UL (ref 3.9–12.7)
WBC # BLD AUTO: 5.28 K/UL (ref 3.9–12.7)
WBC # BLD AUTO: 5.36 K/UL (ref 3.9–12.7)
WBC # BLD AUTO: 5.82 K/UL (ref 3.9–12.7)
WBC # BLD AUTO: 6.1 K/UL (ref 3.9–12.7)
WBC # BLD AUTO: 7.27 K/UL (ref 3.9–12.7)
WBC #/AREA URNS AUTO: >100 /HPF (ref 0–5)
WBC CLUMPS UR QL AUTO: ABNORMAL
YEAST UR QL AUTO: ABNORMAL

## 2022-01-01 PROCEDURE — 99291 PR CRITICAL CARE, E/M 30-74 MINUTES: ICD-10-PCS | Mod: 25,,, | Performed by: EMERGENCY MEDICINE

## 2022-01-01 PROCEDURE — 87075 CULTR BACTERIA EXCEPT BLOOD: CPT | Performed by: STUDENT IN AN ORGANIZED HEALTH CARE EDUCATION/TRAINING PROGRAM

## 2022-01-01 PROCEDURE — 80053 COMPREHEN METABOLIC PANEL: CPT | Performed by: PSYCHIATRY & NEUROLOGY

## 2022-01-01 PROCEDURE — 87070 CULTURE OTHR SPECIMN AEROBIC: CPT | Performed by: STUDENT IN AN ORGANIZED HEALTH CARE EDUCATION/TRAINING PROGRAM

## 2022-01-01 PROCEDURE — 99220 PR INITIAL OBSERVATION CARE,LEVL III: ICD-10-PCS | Mod: ,,, | Performed by: HOSPITALIST

## 2022-01-01 PROCEDURE — 83735 ASSAY OF MAGNESIUM: CPT | Performed by: HOSPITALIST

## 2022-01-01 PROCEDURE — 27100171 HC OXYGEN HIGH FLOW UP TO 24 HOURS

## 2022-01-01 PROCEDURE — 96366 THER/PROPH/DIAG IV INF ADDON: CPT

## 2022-01-01 PROCEDURE — 90935 HEMODIALYSIS ONE EVALUATION: CPT | Mod: ,,, | Performed by: NURSE PRACTITIONER

## 2022-01-01 PROCEDURE — 83036 HEMOGLOBIN GLYCOSYLATED A1C: CPT | Performed by: HOSPITALIST

## 2022-01-01 PROCEDURE — 11000001 HC ACUTE MED/SURG PRIVATE ROOM

## 2022-01-01 PROCEDURE — 85025 COMPLETE CBC W/AUTO DIFF WBC: CPT

## 2022-01-01 PROCEDURE — 99233 SBSQ HOSP IP/OBS HIGH 50: CPT | Mod: ,,,

## 2022-01-01 PROCEDURE — 94761 N-INVAS EAR/PLS OXIMETRY MLT: CPT

## 2022-01-01 PROCEDURE — 25000003 PHARM REV CODE 250: Performed by: PHYSICIAN ASSISTANT

## 2022-01-01 PROCEDURE — 82962 GLUCOSE BLOOD TEST: CPT

## 2022-01-01 PROCEDURE — 99223 PR INITIAL HOSPITAL CARE,LEVL III: ICD-10-PCS | Mod: ,,, | Performed by: PSYCHIATRY & NEUROLOGY

## 2022-01-01 PROCEDURE — 85025 COMPLETE CBC W/AUTO DIFF WBC: CPT | Performed by: HOSPITALIST

## 2022-01-01 PROCEDURE — 87102 FUNGUS ISOLATION CULTURE: CPT | Performed by: STUDENT IN AN ORGANIZED HEALTH CARE EDUCATION/TRAINING PROGRAM

## 2022-01-01 PROCEDURE — 80074 ACUTE HEPATITIS PANEL: CPT | Performed by: INTERNAL MEDICINE

## 2022-01-01 PROCEDURE — 85730 THROMBOPLASTIN TIME PARTIAL: CPT | Performed by: PSYCHIATRY & NEUROLOGY

## 2022-01-01 PROCEDURE — 63600175 PHARM REV CODE 636 W HCPCS: Mod: JG | Performed by: NURSE PRACTITIONER

## 2022-01-01 PROCEDURE — U0002 COVID-19 LAB TEST NON-CDC: HCPCS | Performed by: STUDENT IN AN ORGANIZED HEALTH CARE EDUCATION/TRAINING PROGRAM

## 2022-01-01 PROCEDURE — 25000003 PHARM REV CODE 250: Performed by: PSYCHIATRY & NEUROLOGY

## 2022-01-01 PROCEDURE — 84443 ASSAY THYROID STIM HORMONE: CPT | Performed by: EMERGENCY MEDICINE

## 2022-01-01 PROCEDURE — 87040 BLOOD CULTURE FOR BACTERIA: CPT | Mod: 59 | Performed by: PHYSICIAN ASSISTANT

## 2022-01-01 PROCEDURE — 20000000 HC ICU ROOM

## 2022-01-01 PROCEDURE — 80061 LIPID PANEL: CPT | Performed by: EMERGENCY MEDICINE

## 2022-01-01 PROCEDURE — 25000003 PHARM REV CODE 250: Performed by: HOSPITALIST

## 2022-01-01 PROCEDURE — 86901 BLOOD TYPING SEROLOGIC RH(D): CPT | Performed by: PSYCHIATRY & NEUROLOGY

## 2022-01-01 PROCEDURE — 83605 ASSAY OF LACTIC ACID: CPT | Mod: 91 | Performed by: PSYCHIATRY & NEUROLOGY

## 2022-01-01 PROCEDURE — 99232 PR SUBSEQUENT HOSPITAL CARE,LEVL II: ICD-10-PCS | Mod: ,,, | Performed by: NURSE PRACTITIONER

## 2022-01-01 PROCEDURE — 83036 HEMOGLOBIN GLYCOSYLATED A1C: CPT | Performed by: EMERGENCY MEDICINE

## 2022-01-01 PROCEDURE — A4216 STERILE WATER/SALINE, 10 ML: HCPCS | Performed by: HOSPITALIST

## 2022-01-01 PROCEDURE — 99232 SBSQ HOSP IP/OBS MODERATE 35: CPT | Mod: ,,, | Performed by: NURSE PRACTITIONER

## 2022-01-01 PROCEDURE — 84466 ASSAY OF TRANSFERRIN: CPT | Performed by: INTERNAL MEDICINE

## 2022-01-01 PROCEDURE — 81001 URINALYSIS AUTO W/SCOPE: CPT | Performed by: EMERGENCY MEDICINE

## 2022-01-01 PROCEDURE — 36415 COLL VENOUS BLD VENIPUNCTURE: CPT | Performed by: INTERNAL MEDICINE

## 2022-01-01 PROCEDURE — 94003 VENT MGMT INPAT SUBQ DAY: CPT

## 2022-01-01 PROCEDURE — 36415 COLL VENOUS BLD VENIPUNCTURE: CPT | Performed by: HOSPITALIST

## 2022-01-01 PROCEDURE — 37000009 HC ANESTHESIA EA ADD 15 MINS: Performed by: PODIATRIST

## 2022-01-01 PROCEDURE — 87205 SMEAR GRAM STAIN: CPT | Performed by: STUDENT IN AN ORGANIZED HEALTH CARE EDUCATION/TRAINING PROGRAM

## 2022-01-01 PROCEDURE — 80048 BASIC METABOLIC PNL TOTAL CA: CPT | Performed by: HOSPITALIST

## 2022-01-01 PROCEDURE — 99239 HOSP IP/OBS DSCHRG MGMT >30: CPT | Mod: ,,, | Performed by: PHYSICIAN ASSISTANT

## 2022-01-01 PROCEDURE — 84484 ASSAY OF TROPONIN QUANT: CPT

## 2022-01-01 PROCEDURE — 99223 1ST HOSP IP/OBS HIGH 75: CPT | Mod: ,,, | Performed by: PODIATRIST

## 2022-01-01 PROCEDURE — 82962 GLUCOSE BLOOD TEST: CPT | Performed by: PODIATRIST

## 2022-01-01 PROCEDURE — G0378 HOSPITAL OBSERVATION PER HR: HCPCS

## 2022-01-01 PROCEDURE — 80202 ASSAY OF VANCOMYCIN: CPT | Performed by: PSYCHIATRY & NEUROLOGY

## 2022-01-01 PROCEDURE — 99999 PR PBB SHADOW E&M-EST. PATIENT-LVL III: ICD-10-PCS | Mod: PBBFAC,,, | Performed by: PODIATRIST

## 2022-01-01 PROCEDURE — 63600175 PHARM REV CODE 636 W HCPCS: Performed by: HOSPITALIST

## 2022-01-01 PROCEDURE — 99291 CRITICAL CARE FIRST HOUR: CPT | Mod: 25,,, | Performed by: EMERGENCY MEDICINE

## 2022-01-01 PROCEDURE — 25500020 PHARM REV CODE 255: Performed by: EMERGENCY MEDICINE

## 2022-01-01 PROCEDURE — 96374 THER/PROPH/DIAG INJ IV PUSH: CPT | Mod: 59

## 2022-01-01 PROCEDURE — 88305 TISSUE EXAM BY PATHOLOGIST: ICD-10-PCS | Mod: 26,,, | Performed by: PATHOLOGY

## 2022-01-01 PROCEDURE — 25000003 PHARM REV CODE 250

## 2022-01-01 PROCEDURE — 36000706: Performed by: PODIATRIST

## 2022-01-01 PROCEDURE — 88305 TISSUE EXAM BY PATHOLOGIST: CPT | Performed by: PATHOLOGY

## 2022-01-01 PROCEDURE — 96367 TX/PROPH/DG ADDL SEQ IV INF: CPT

## 2022-01-01 PROCEDURE — 88311 PR  DECALCIFY TISSUE: ICD-10-PCS | Mod: 26,,, | Performed by: PATHOLOGY

## 2022-01-01 PROCEDURE — 99233 PR SUBSEQUENT HOSPITAL CARE,LEVL III: ICD-10-PCS | Mod: ,,,

## 2022-01-01 PROCEDURE — 1111F DSCHRG MED/CURRENT MED MERGE: CPT | Mod: CPTII,,, | Performed by: PHYSICIAN ASSISTANT

## 2022-01-01 PROCEDURE — 99900026 HC AIRWAY MAINTENANCE (STAT)

## 2022-01-01 PROCEDURE — 63600175 PHARM REV CODE 636 W HCPCS

## 2022-01-01 PROCEDURE — 25000003 PHARM REV CODE 250: Performed by: EMERGENCY MEDICINE

## 2022-01-01 PROCEDURE — 71000015 HC POSTOP RECOV 1ST HR: Performed by: PODIATRIST

## 2022-01-01 PROCEDURE — 63600175 PHARM REV CODE 636 W HCPCS: Performed by: PHYSICIAN ASSISTANT

## 2022-01-01 PROCEDURE — 80053 COMPREHEN METABOLIC PANEL: CPT

## 2022-01-01 PROCEDURE — 99900035 HC TECH TIME PER 15 MIN (STAT)

## 2022-01-01 PROCEDURE — 31500 PR INSERT, EMERGENCY ENDOTRACH AIRWAY: ICD-10-PCS | Mod: ,,, | Performed by: EMERGENCY MEDICINE

## 2022-01-01 PROCEDURE — 99291 CRITICAL CARE FIRST HOUR: CPT | Mod: 25

## 2022-01-01 PROCEDURE — 96375 TX/PRO/DX INJ NEW DRUG ADDON: CPT

## 2022-01-01 PROCEDURE — 25000003 PHARM REV CODE 250: Performed by: STUDENT IN AN ORGANIZED HEALTH CARE EDUCATION/TRAINING PROGRAM

## 2022-01-01 PROCEDURE — 99233 PR SUBSEQUENT HOSPITAL CARE,LEVL III: ICD-10-PCS | Mod: ,,, | Performed by: PHYSICIAN ASSISTANT

## 2022-01-01 PROCEDURE — 96365 THER/PROPH/DIAG IV INF INIT: CPT | Mod: 59

## 2022-01-01 PROCEDURE — 63600175 PHARM REV CODE 636 W HCPCS: Performed by: INTERNAL MEDICINE

## 2022-01-01 PROCEDURE — 36000707: Performed by: PODIATRIST

## 2022-01-01 PROCEDURE — D9220A PRA ANESTHESIA: ICD-10-PCS | Mod: ,,, | Performed by: ANESTHESIOLOGY

## 2022-01-01 PROCEDURE — 87077 CULTURE AEROBIC IDENTIFY: CPT | Performed by: STUDENT IN AN ORGANIZED HEALTH CARE EDUCATION/TRAINING PROGRAM

## 2022-01-01 PROCEDURE — A4216 STERILE WATER/SALINE, 10 ML: HCPCS | Performed by: STUDENT IN AN ORGANIZED HEALTH CARE EDUCATION/TRAINING PROGRAM

## 2022-01-01 PROCEDURE — 87076 CULTURE ANAEROBE IDENT EACH: CPT | Mod: 59 | Performed by: STUDENT IN AN ORGANIZED HEALTH CARE EDUCATION/TRAINING PROGRAM

## 2022-01-01 PROCEDURE — 99291 PR CRITICAL CARE, E/M 30-74 MINUTES: ICD-10-PCS | Mod: ,,, | Performed by: PSYCHIATRY & NEUROLOGY

## 2022-01-01 PROCEDURE — 93010 ELECTROCARDIOGRAM REPORT: CPT | Mod: ,,, | Performed by: INTERNAL MEDICINE

## 2022-01-01 PROCEDURE — 87086 URINE CULTURE/COLONY COUNT: CPT | Performed by: EMERGENCY MEDICINE

## 2022-01-01 PROCEDURE — 83735 ASSAY OF MAGNESIUM: CPT | Performed by: PSYCHIATRY & NEUROLOGY

## 2022-01-01 PROCEDURE — 99024 PR POST-OP FOLLOW-UP VISIT: ICD-10-PCS | Mod: POP,,, | Performed by: PODIATRIST

## 2022-01-01 PROCEDURE — 84100 ASSAY OF PHOSPHORUS: CPT | Performed by: PSYCHIATRY & NEUROLOGY

## 2022-01-01 PROCEDURE — 25000003 PHARM REV CODE 250: Performed by: NURSE PRACTITIONER

## 2022-01-01 PROCEDURE — 1111F PR DISCHARGE MEDS RECONCILED W/ CURRENT OUTPATIENT MED LIST: ICD-10-PCS | Mod: CPTII,,, | Performed by: PHYSICIAN ASSISTANT

## 2022-01-01 PROCEDURE — 96365 THER/PROPH/DIAG IV INF INIT: CPT

## 2022-01-01 PROCEDURE — 63600175 PHARM REV CODE 636 W HCPCS: Performed by: ANESTHESIOLOGY

## 2022-01-01 PROCEDURE — 99223 PR INITIAL HOSPITAL CARE,LEVL III: ICD-10-PCS | Mod: ,,, | Performed by: PODIATRIST

## 2022-01-01 PROCEDURE — 84100 ASSAY OF PHOSPHORUS: CPT | Performed by: HOSPITALIST

## 2022-01-01 PROCEDURE — 85610 PROTHROMBIN TIME: CPT | Performed by: EMERGENCY MEDICINE

## 2022-01-01 PROCEDURE — 27000221 HC OXYGEN, UP TO 24 HOURS

## 2022-01-01 PROCEDURE — 90935 PR HEMODIALYSIS, ONE EVALUATION: ICD-10-PCS | Mod: ,,, | Performed by: NURSE PRACTITIONER

## 2022-01-01 PROCEDURE — 99239 PR HOSPITAL DISCHARGE DAY,>30 MIN: ICD-10-PCS | Mod: ,,, | Performed by: PHYSICIAN ASSISTANT

## 2022-01-01 PROCEDURE — 71000033 HC RECOVERY, INTIAL HOUR: Performed by: PODIATRIST

## 2022-01-01 PROCEDURE — 99223 1ST HOSP IP/OBS HIGH 75: CPT | Mod: GC,,, | Performed by: NEUROLOGICAL SURGERY

## 2022-01-01 PROCEDURE — 99233 PR SUBSEQUENT HOSPITAL CARE,LEVL III: ICD-10-PCS | Mod: ,,, | Performed by: PSYCHIATRY & NEUROLOGY

## 2022-01-01 PROCEDURE — 99291 PR CRITICAL CARE, E/M 30-74 MINUTES: ICD-10-PCS | Mod: GC,,, | Performed by: PSYCHIATRY & NEUROLOGY

## 2022-01-01 PROCEDURE — 25000003 PHARM REV CODE 250: Performed by: INTERNAL MEDICINE

## 2022-01-01 PROCEDURE — 87186 SC STD MICRODIL/AGAR DIL: CPT | Performed by: STUDENT IN AN ORGANIZED HEALTH CARE EDUCATION/TRAINING PROGRAM

## 2022-01-01 PROCEDURE — 84484 ASSAY OF TROPONIN QUANT: CPT | Mod: 91 | Performed by: PSYCHIATRY & NEUROLOGY

## 2022-01-01 PROCEDURE — 82010 KETONE BODYS QUAN: CPT | Performed by: EMERGENCY MEDICINE

## 2022-01-01 PROCEDURE — 85007 BL SMEAR W/DIFF WBC COUNT: CPT | Performed by: PSYCHIATRY & NEUROLOGY

## 2022-01-01 PROCEDURE — 84484 ASSAY OF TROPONIN QUANT: CPT | Performed by: EMERGENCY MEDICINE

## 2022-01-01 PROCEDURE — 99285 EMERGENCY DEPT VISIT HI MDM: CPT | Mod: CS,,, | Performed by: EMERGENCY MEDICINE

## 2022-01-01 PROCEDURE — 85652 RBC SED RATE AUTOMATED: CPT

## 2022-01-01 PROCEDURE — 88311 DECALCIFY TISSUE: CPT | Mod: 26,,, | Performed by: PATHOLOGY

## 2022-01-01 PROCEDURE — 63600175 PHARM REV CODE 636 W HCPCS: Mod: JG | Performed by: STUDENT IN AN ORGANIZED HEALTH CARE EDUCATION/TRAINING PROGRAM

## 2022-01-01 PROCEDURE — 99223 1ST HOSP IP/OBS HIGH 75: CPT | Mod: ,,, | Performed by: PSYCHIATRY & NEUROLOGY

## 2022-01-01 PROCEDURE — 99220 PR INITIAL OBSERVATION CARE,LEVL III: CPT | Mod: ,,, | Performed by: HOSPITALIST

## 2022-01-01 PROCEDURE — 80100016 HC MAINTENANCE HEMODIALYSIS

## 2022-01-01 PROCEDURE — 99238 PR HOSPITAL DISCHARGE DAY,<30 MIN: ICD-10-PCS | Mod: 95,,, | Performed by: NURSE PRACTITIONER

## 2022-01-01 PROCEDURE — 85027 COMPLETE CBC AUTOMATED: CPT | Performed by: PSYCHIATRY & NEUROLOGY

## 2022-01-01 PROCEDURE — 63600175 PHARM REV CODE 636 W HCPCS: Performed by: PSYCHIATRY & NEUROLOGY

## 2022-01-01 PROCEDURE — 87186 SC STD MICRODIL/AGAR DIL: CPT | Mod: 59 | Performed by: EMERGENCY MEDICINE

## 2022-01-01 PROCEDURE — 28820 PR AMPUTATION TOE,MT-P JT: ICD-10-PCS | Mod: TA,,, | Performed by: PODIATRIST

## 2022-01-01 PROCEDURE — 82803 BLOOD GASES ANY COMBINATION: CPT

## 2022-01-01 PROCEDURE — 99223 1ST HOSP IP/OBS HIGH 75: CPT | Mod: ,,, | Performed by: PHYSICIAN ASSISTANT

## 2022-01-01 PROCEDURE — 37000008 HC ANESTHESIA 1ST 15 MINUTES: Performed by: PODIATRIST

## 2022-01-01 PROCEDURE — 87184 SC STD DISK METHOD PER PLATE: CPT | Performed by: EMERGENCY MEDICINE

## 2022-01-01 PROCEDURE — 99999 PR PBB SHADOW E&M-EST. PATIENT-LVL III: CPT | Mod: PBBFAC,,, | Performed by: PODIATRIST

## 2022-01-01 PROCEDURE — U0002 COVID-19 LAB TEST NON-CDC: HCPCS

## 2022-01-01 PROCEDURE — 85025 COMPLETE CBC W/AUTO DIFF WBC: CPT | Performed by: EMERGENCY MEDICINE

## 2022-01-01 PROCEDURE — 36556 PR INSERT NON-TUNNEL CV CATH 5+ YRS OLD: ICD-10-PCS | Mod: 59,,, | Performed by: EMERGENCY MEDICINE

## 2022-01-01 PROCEDURE — D9220A PRA ANESTHESIA: Mod: ,,, | Performed by: ANESTHESIOLOGY

## 2022-01-01 PROCEDURE — 80202 ASSAY OF VANCOMYCIN: CPT | Performed by: INTERNAL MEDICINE

## 2022-01-01 PROCEDURE — 31500 INSERT EMERGENCY AIRWAY: CPT

## 2022-01-01 PROCEDURE — 31500 INSERT EMERGENCY AIRWAY: CPT | Mod: ,,, | Performed by: EMERGENCY MEDICINE

## 2022-01-01 PROCEDURE — 99238 HOSP IP/OBS DSCHRG MGMT 30/<: CPT | Mod: 95,,, | Performed by: NURSE PRACTITIONER

## 2022-01-01 PROCEDURE — 93010 EKG 12-LEAD: ICD-10-PCS | Mod: ,,, | Performed by: INTERNAL MEDICINE

## 2022-01-01 PROCEDURE — 99233 SBSQ HOSP IP/OBS HIGH 50: CPT | Mod: ,,, | Performed by: PHYSICIAN ASSISTANT

## 2022-01-01 PROCEDURE — 28820 AMPUTATION OF TOE: CPT | Mod: TA,,, | Performed by: PODIATRIST

## 2022-01-01 PROCEDURE — 99223 PR INITIAL HOSPITAL CARE,LEVL III: ICD-10-PCS | Mod: GC,,, | Performed by: NEUROLOGICAL SURGERY

## 2022-01-01 PROCEDURE — 51702 INSERT TEMP BLADDER CATH: CPT

## 2022-01-01 PROCEDURE — 93005 ELECTROCARDIOGRAM TRACING: CPT

## 2022-01-01 PROCEDURE — 87176 TISSUE HOMOGENIZATION CULTR: CPT | Performed by: STUDENT IN AN ORGANIZED HEALTH CARE EDUCATION/TRAINING PROGRAM

## 2022-01-01 PROCEDURE — 83605 ASSAY OF LACTIC ACID: CPT | Mod: 91 | Performed by: EMERGENCY MEDICINE

## 2022-01-01 PROCEDURE — 99024 POSTOP FOLLOW-UP VISIT: CPT | Mod: POP,,, | Performed by: PODIATRIST

## 2022-01-01 PROCEDURE — 25000003 PHARM REV CODE 250: Performed by: PODIATRIST

## 2022-01-01 PROCEDURE — 80053 COMPREHEN METABOLIC PANEL: CPT | Performed by: EMERGENCY MEDICINE

## 2022-01-01 PROCEDURE — 99223 PR INITIAL HOSPITAL CARE,LEVL III: ICD-10-PCS | Mod: ,,, | Performed by: PHYSICIAN ASSISTANT

## 2022-01-01 PROCEDURE — 88311 DECALCIFY TISSUE: CPT | Mod: 59 | Performed by: PATHOLOGY

## 2022-01-01 PROCEDURE — 99233 SBSQ HOSP IP/OBS HIGH 50: CPT | Mod: ,,, | Performed by: PSYCHIATRY & NEUROLOGY

## 2022-01-01 PROCEDURE — 82728 ASSAY OF FERRITIN: CPT | Performed by: INTERNAL MEDICINE

## 2022-01-01 PROCEDURE — 36556 INSERT NON-TUNNEL CV CATH: CPT | Mod: 59,,, | Performed by: EMERGENCY MEDICINE

## 2022-01-01 PROCEDURE — 86140 C-REACTIVE PROTEIN: CPT

## 2022-01-01 PROCEDURE — 25000003 PHARM REV CODE 250: Performed by: ANESTHESIOLOGY

## 2022-01-01 PROCEDURE — 96372 THER/PROPH/DIAG INJ SC/IM: CPT | Performed by: HOSPITALIST

## 2022-01-01 PROCEDURE — 99285 EMERGENCY DEPT VISIT HI MDM: CPT | Mod: 25

## 2022-01-01 PROCEDURE — 88305 TISSUE EXAM BY PATHOLOGIST: CPT | Mod: 26,,, | Performed by: PATHOLOGY

## 2022-01-01 PROCEDURE — 83880 ASSAY OF NATRIURETIC PEPTIDE: CPT

## 2022-01-01 PROCEDURE — 99233 PR SUBSEQUENT HOSPITAL CARE,LEVL III: ICD-10-PCS | Mod: ,,, | Performed by: PODIATRIST

## 2022-01-01 PROCEDURE — 99291 CRITICAL CARE FIRST HOUR: CPT | Mod: ,,, | Performed by: PSYCHIATRY & NEUROLOGY

## 2022-01-01 PROCEDURE — 99291 CRITICAL CARE FIRST HOUR: CPT | Mod: GC,,, | Performed by: PSYCHIATRY & NEUROLOGY

## 2022-01-01 PROCEDURE — 80048 BASIC METABOLIC PNL TOTAL CA: CPT | Mod: XB

## 2022-01-01 PROCEDURE — 85610 PROTHROMBIN TIME: CPT | Performed by: PSYCHIATRY & NEUROLOGY

## 2022-01-01 PROCEDURE — 87077 CULTURE AEROBIC IDENTIFY: CPT | Mod: 59 | Performed by: EMERGENCY MEDICINE

## 2022-01-01 PROCEDURE — 99285 PR EMERGENCY DEPT VISIT,LEVEL V: ICD-10-PCS | Mod: CS,,, | Performed by: EMERGENCY MEDICINE

## 2022-01-01 PROCEDURE — 80202 ASSAY OF VANCOMYCIN: CPT | Performed by: HOSPITALIST

## 2022-01-01 PROCEDURE — 96361 HYDRATE IV INFUSION ADD-ON: CPT

## 2022-01-01 PROCEDURE — 87040 BLOOD CULTURE FOR BACTERIA: CPT | Performed by: EMERGENCY MEDICINE

## 2022-01-01 PROCEDURE — 63600175 PHARM REV CODE 636 W HCPCS: Performed by: EMERGENCY MEDICINE

## 2022-01-01 PROCEDURE — 36556 INSERT NON-TUNNEL CV CATH: CPT

## 2022-01-01 PROCEDURE — 99233 SBSQ HOSP IP/OBS HIGH 50: CPT | Mod: ,,, | Performed by: PODIATRIST

## 2022-01-01 RX ORDER — MUPIROCIN 20 MG/G
OINTMENT TOPICAL 2 TIMES DAILY
Status: DISCONTINUED | OUTPATIENT
Start: 2022-01-01 | End: 2022-01-01 | Stop reason: HOSPADM

## 2022-01-01 RX ORDER — MORPHINE SULFATE 2 MG/ML
2 INJECTION, SOLUTION INTRAMUSCULAR; INTRAVENOUS EVERY 4 HOURS PRN
Status: DISCONTINUED | OUTPATIENT
Start: 2022-01-01 | End: 2022-01-01 | Stop reason: HOSPADM

## 2022-01-01 RX ORDER — ROCURONIUM BROMIDE 10 MG/ML
INJECTION, SOLUTION INTRAVENOUS
Status: COMPLETED
Start: 2022-01-01 | End: 2022-01-01

## 2022-01-01 RX ORDER — SODIUM CHLORIDE 0.9 % (FLUSH) 0.9 %
10 SYRINGE (ML) INJECTION
Status: DISCONTINUED | OUTPATIENT
Start: 2022-01-01 | End: 2022-01-01

## 2022-01-01 RX ORDER — LORAZEPAM 2 MG/ML
1 INJECTION INTRAMUSCULAR
Status: DISCONTINUED | OUTPATIENT
Start: 2022-01-01 | End: 2022-01-01 | Stop reason: HOSPADM

## 2022-01-01 RX ORDER — FAMOTIDINE 20 MG/1
20 TABLET, FILM COATED ORAL DAILY
Status: DISCONTINUED | OUTPATIENT
Start: 2022-01-01 | End: 2022-01-01 | Stop reason: HOSPADM

## 2022-01-01 RX ORDER — ASPIRIN 81 MG/1
81 TABLET ORAL DAILY
Status: DISCONTINUED | OUTPATIENT
Start: 2022-01-01 | End: 2022-01-01 | Stop reason: HOSPADM

## 2022-01-01 RX ORDER — ROCURONIUM BROMIDE 10 MG/ML
1 INJECTION, SOLUTION INTRAVENOUS ONCE
Status: COMPLETED | OUTPATIENT
Start: 2022-01-01 | End: 2022-01-01

## 2022-01-01 RX ORDER — TALC
6 POWDER (GRAM) TOPICAL NIGHTLY PRN
Status: DISCONTINUED | OUTPATIENT
Start: 2022-01-01 | End: 2022-01-01 | Stop reason: HOSPADM

## 2022-01-01 RX ORDER — PROPOFOL 10 MG/ML
VIAL (ML) INTRAVENOUS
Status: DISCONTINUED | OUTPATIENT
Start: 2022-01-01 | End: 2022-01-01

## 2022-01-01 RX ORDER — SODIUM CHLORIDE 9 MG/ML
INJECTION, SOLUTION INTRAVENOUS
Status: DISCONTINUED | OUTPATIENT
Start: 2022-01-01 | End: 2022-01-01 | Stop reason: HOSPADM

## 2022-01-01 RX ORDER — MORPHINE SULFATE 2 MG/ML
2 INJECTION, SOLUTION INTRAMUSCULAR; INTRAVENOUS ONCE
Status: COMPLETED | OUTPATIENT
Start: 2022-01-01 | End: 2022-01-01

## 2022-01-01 RX ORDER — IBUPROFEN 200 MG
24 TABLET ORAL
Status: DISCONTINUED | OUTPATIENT
Start: 2022-01-01 | End: 2022-01-01 | Stop reason: HOSPADM

## 2022-01-01 RX ORDER — POLYETHYLENE GLYCOL 3350 17 G/17G
17 POWDER, FOR SOLUTION ORAL 2 TIMES DAILY PRN
Status: DISCONTINUED | OUTPATIENT
Start: 2022-01-01 | End: 2022-01-01 | Stop reason: HOSPADM

## 2022-01-01 RX ORDER — MANNITOL 250 MG/ML
50 INJECTION, SOLUTION INTRAVENOUS ONCE
Status: COMPLETED | OUTPATIENT
Start: 2022-01-01 | End: 2022-01-01

## 2022-01-01 RX ORDER — GLUCAGON 1 MG
1 KIT INJECTION
Status: DISCONTINUED | OUTPATIENT
Start: 2022-01-01 | End: 2022-01-01 | Stop reason: HOSPADM

## 2022-01-01 RX ORDER — CALCIUM ACETATE 667 MG/1
667 CAPSULE ORAL
Refills: 3 | Status: DISCONTINUED | OUTPATIENT
Start: 2022-01-01 | End: 2022-01-01 | Stop reason: HOSPADM

## 2022-01-01 RX ORDER — ACETAMINOPHEN 325 MG/1
650 TABLET ORAL EVERY 8 HOURS PRN
Status: DISCONTINUED | OUTPATIENT
Start: 2022-01-01 | End: 2022-01-01 | Stop reason: HOSPADM

## 2022-01-01 RX ORDER — ONDANSETRON 2 MG/ML
4 INJECTION INTRAMUSCULAR; INTRAVENOUS EVERY 8 HOURS PRN
Status: DISCONTINUED | OUTPATIENT
Start: 2022-01-01 | End: 2022-01-01 | Stop reason: HOSPADM

## 2022-01-01 RX ORDER — INSULIN ASPART 100 [IU]/ML
1-10 INJECTION, SOLUTION INTRAVENOUS; SUBCUTANEOUS EVERY 6 HOURS PRN
Status: DISCONTINUED | OUTPATIENT
Start: 2022-01-01 | End: 2022-01-01

## 2022-01-01 RX ORDER — LIDOCAINE HYDROCHLORIDE 10 MG/ML
INJECTION INFILTRATION; PERINEURAL
Status: DISCONTINUED | OUTPATIENT
Start: 2022-01-01 | End: 2022-01-01 | Stop reason: HOSPADM

## 2022-01-01 RX ORDER — LEVETIRACETAM 500 MG/5ML
500 INJECTION, SOLUTION, CONCENTRATE INTRAVENOUS
Status: COMPLETED | OUTPATIENT
Start: 2022-01-01 | End: 2022-01-01

## 2022-01-01 RX ORDER — ONDANSETRON 2 MG/ML
4 INJECTION INTRAMUSCULAR; INTRAVENOUS DAILY PRN
Status: DISCONTINUED | OUTPATIENT
Start: 2022-01-01 | End: 2022-01-01 | Stop reason: HOSPADM

## 2022-01-01 RX ORDER — METOPROLOL SUCCINATE 25 MG/1
25 TABLET, EXTENDED RELEASE ORAL DAILY
COMMUNITY
Start: 2022-01-01

## 2022-01-01 RX ORDER — CYANOCOBALAMIN (VITAMIN B-12) 250 MCG
250 TABLET ORAL DAILY
Status: DISCONTINUED | OUTPATIENT
Start: 2022-01-01 | End: 2022-01-01 | Stop reason: HOSPADM

## 2022-01-01 RX ORDER — DEXTROSE MONOHYDRATE 100 MG/ML
INJECTION, SOLUTION INTRAVENOUS
Status: DISCONTINUED | OUTPATIENT
Start: 2022-01-01 | End: 2022-01-01

## 2022-01-01 RX ORDER — CLOPIDOGREL BISULFATE 75 MG/1
75 TABLET ORAL DAILY
Status: DISCONTINUED | OUTPATIENT
Start: 2022-01-01 | End: 2022-01-01 | Stop reason: HOSPADM

## 2022-01-01 RX ORDER — BUPIVACAINE HYDROCHLORIDE 5 MG/ML
INJECTION, SOLUTION EPIDURAL; INTRACAUDAL
Status: DISCONTINUED | OUTPATIENT
Start: 2022-01-01 | End: 2022-01-01 | Stop reason: HOSPADM

## 2022-01-01 RX ORDER — LINACLOTIDE 72 UG/1
72 CAPSULE, GELATIN COATED ORAL EVERY MORNING
COMMUNITY
Start: 2022-01-01

## 2022-01-01 RX ORDER — PROPOFOL 10 MG/ML
0-50 INJECTION, EMULSION INTRAVENOUS CONTINUOUS
Status: DISCONTINUED | OUTPATIENT
Start: 2022-01-01 | End: 2022-01-01

## 2022-01-01 RX ORDER — PROPOFOL 10 MG/ML
VIAL (ML) INTRAVENOUS CONTINUOUS PRN
Status: DISCONTINUED | OUTPATIENT
Start: 2022-01-01 | End: 2022-01-01

## 2022-01-01 RX ORDER — METOPROLOL TARTRATE 25 MG/1
25 TABLET, FILM COATED ORAL DAILY
Status: DISCONTINUED | OUTPATIENT
Start: 2022-01-01 | End: 2022-01-01 | Stop reason: HOSPADM

## 2022-01-01 RX ORDER — GLUCAGON 1 MG
1 KIT INJECTION
Status: DISCONTINUED | OUTPATIENT
Start: 2022-01-01 | End: 2022-01-01

## 2022-01-01 RX ORDER — ETOMIDATE 2 MG/ML
INJECTION INTRAVENOUS
Status: COMPLETED
Start: 2022-01-01 | End: 2022-01-01

## 2022-01-01 RX ORDER — FENTANYL CITRATE 50 UG/ML
50 INJECTION, SOLUTION INTRAMUSCULAR; INTRAVENOUS
Status: ACTIVE | OUTPATIENT
Start: 2022-01-01 | End: 2022-01-01

## 2022-01-01 RX ORDER — FENTANYL CITRATE-0.9 % NACL/PF 10 MCG/ML
0-250 PLASTIC BAG, INJECTION (ML) INTRAVENOUS CONTINUOUS
Status: DISCONTINUED | OUTPATIENT
Start: 2022-01-01 | End: 2022-01-01

## 2022-01-01 RX ORDER — LIDOCAINE HYDROCHLORIDE 10 MG/ML
10 INJECTION INFILTRATION; PERINEURAL ONCE
Status: DISCONTINUED | OUTPATIENT
Start: 2022-01-01 | End: 2022-01-01 | Stop reason: HOSPADM

## 2022-01-01 RX ORDER — 3% SODIUM CHLORIDE 3 G/100ML
250 INJECTION, SOLUTION INTRAVENOUS ONCE
Status: COMPLETED | OUTPATIENT
Start: 2022-01-01 | End: 2022-01-01

## 2022-01-01 RX ORDER — NOREPINEPHRINE BITARTRATE/D5W 4MG/250ML
0-3 PLASTIC BAG, INJECTION (ML) INTRAVENOUS CONTINUOUS
Status: DISCONTINUED | OUTPATIENT
Start: 2022-01-01 | End: 2022-01-01

## 2022-01-01 RX ORDER — DEXMEDETOMIDINE HYDROCHLORIDE 4 UG/ML
INJECTION, SOLUTION INTRAVENOUS
Status: DISPENSED
Start: 2022-01-01 | End: 2022-01-01

## 2022-01-01 RX ORDER — INSULIN ASPART 100 [IU]/ML
0-5 INJECTION, SOLUTION INTRAVENOUS; SUBCUTANEOUS
Status: DISCONTINUED | OUTPATIENT
Start: 2022-01-01 | End: 2022-01-01 | Stop reason: HOSPADM

## 2022-01-01 RX ORDER — LIDOCAINE HYDROCHLORIDE 20 MG/ML
INJECTION, SOLUTION EPIDURAL; INFILTRATION; INTRACAUDAL; PERINEURAL
Status: DISCONTINUED | OUTPATIENT
Start: 2022-01-01 | End: 2022-01-01

## 2022-01-01 RX ORDER — ACETAMINOPHEN 650 MG/1
650 SUPPOSITORY RECTAL
Status: COMPLETED | OUTPATIENT
Start: 2022-01-01 | End: 2022-01-01

## 2022-01-01 RX ORDER — FENTANYL CITRATE 50 UG/ML
25 INJECTION, SOLUTION INTRAMUSCULAR; INTRAVENOUS EVERY 5 MIN PRN
Status: DISCONTINUED | OUTPATIENT
Start: 2022-01-01 | End: 2022-01-01 | Stop reason: HOSPADM

## 2022-01-01 RX ORDER — SODIUM CHLORIDE 9 MG/ML
INJECTION, SOLUTION INTRAVENOUS ONCE
Status: COMPLETED | OUTPATIENT
Start: 2022-01-01 | End: 2022-01-01

## 2022-01-01 RX ORDER — PHENYLEPHRINE HCL IN 0.9% NACL 1 MG/10 ML
SYRINGE (ML) INTRAVENOUS
Status: DISCONTINUED | OUTPATIENT
Start: 2022-01-01 | End: 2022-01-01

## 2022-01-01 RX ORDER — LABETALOL HCL 20 MG/4 ML
10 SYRINGE (ML) INTRAVENOUS
Status: DISCONTINUED | OUTPATIENT
Start: 2022-01-01 | End: 2022-01-01

## 2022-01-01 RX ORDER — SERTRALINE HYDROCHLORIDE 25 MG/1
25 TABLET, FILM COATED ORAL DAILY
COMMUNITY
Start: 2022-01-01

## 2022-01-01 RX ORDER — INSULIN ASPART 100 [IU]/ML
INJECTION, SOLUTION INTRAVENOUS; SUBCUTANEOUS
COMMUNITY
Start: 2022-01-01

## 2022-01-01 RX ORDER — IBUPROFEN 200 MG
16 TABLET ORAL
Status: DISCONTINUED | OUTPATIENT
Start: 2022-01-01 | End: 2022-01-01 | Stop reason: HOSPADM

## 2022-01-01 RX ORDER — MORPHINE SULFATE 1 MG/ML
0-10 INJECTION, SOLUTION INTRAVENOUS CONTINUOUS
Status: DISCONTINUED | OUTPATIENT
Start: 2022-01-01 | End: 2022-01-01 | Stop reason: HOSPADM

## 2022-01-01 RX ORDER — SODIUM CHLORIDE 0.9 % (FLUSH) 0.9 %
10 SYRINGE (ML) INJECTION EVERY 12 HOURS PRN
Status: DISCONTINUED | OUTPATIENT
Start: 2022-01-01 | End: 2022-01-01 | Stop reason: HOSPADM

## 2022-01-01 RX ORDER — AMLODIPINE BESYLATE 5 MG/1
5 TABLET ORAL DAILY
COMMUNITY
Start: 2022-01-01

## 2022-01-01 RX ORDER — SODIUM CHLORIDE 0.9 % (FLUSH) 0.9 %
10 SYRINGE (ML) INJECTION
Status: DISCONTINUED | OUTPATIENT
Start: 2022-01-01 | End: 2022-01-01 | Stop reason: HOSPADM

## 2022-01-01 RX ORDER — HEPARIN SODIUM 1000 [USP'U]/ML
1000 INJECTION, SOLUTION INTRAVENOUS; SUBCUTANEOUS
Status: DISCONTINUED | OUTPATIENT
Start: 2022-01-01 | End: 2022-01-01 | Stop reason: HOSPADM

## 2022-01-01 RX ORDER — FENTANYL CITRATE 50 UG/ML
INJECTION, SOLUTION INTRAMUSCULAR; INTRAVENOUS
Status: DISCONTINUED | OUTPATIENT
Start: 2022-01-01 | End: 2022-01-01

## 2022-01-01 RX ORDER — TRAMADOL HYDROCHLORIDE 50 MG/1
50 TABLET ORAL
Qty: 90 TABLET | Refills: 0 | Status: SHIPPED | OUTPATIENT
Start: 2022-01-01

## 2022-01-01 RX ORDER — ATORVASTATIN CALCIUM 40 MG/1
40 TABLET, FILM COATED ORAL DAILY
Status: DISCONTINUED | OUTPATIENT
Start: 2022-01-01 | End: 2022-01-01 | Stop reason: HOSPADM

## 2022-01-01 RX ORDER — ETOMIDATE 2 MG/ML
20 INJECTION INTRAVENOUS
Status: COMPLETED | OUTPATIENT
Start: 2022-01-01 | End: 2022-01-01

## 2022-01-01 RX ORDER — VANCOMYCIN HCL IN 5 % DEXTROSE 1G/250ML
20 PLASTIC BAG, INJECTION (ML) INTRAVENOUS
Status: COMPLETED | OUTPATIENT
Start: 2022-01-01 | End: 2022-01-01

## 2022-01-01 RX ADMIN — ACETAMINOPHEN 650 MG: 650 SUPPOSITORY RECTAL at 10:05

## 2022-01-01 RX ADMIN — Medication 5 MG/HR: at 09:05

## 2022-01-01 RX ADMIN — CYANOCOBALAMIN TAB 250 MCG 250 MCG: 250 TAB at 01:03

## 2022-01-01 RX ADMIN — MUPIROCIN: 20 OINTMENT TOPICAL at 09:04

## 2022-01-01 RX ADMIN — FAMOTIDINE 20 MG: 20 TABLET ORAL at 01:03

## 2022-01-01 RX ADMIN — MORPHINE SULFATE 2 MG: 2 INJECTION, SOLUTION INTRAMUSCULAR; INTRAVENOUS at 05:03

## 2022-01-01 RX ADMIN — ROCURONIUM BROMIDE 100 MG: 10 INJECTION, SOLUTION INTRAVENOUS at 08:05

## 2022-01-01 RX ADMIN — MUPIROCIN: 20 OINTMENT TOPICAL at 11:03

## 2022-01-01 RX ADMIN — CALCIUM ACETATE 667 MG: 667 CAPSULE ORAL at 12:03

## 2022-01-01 RX ADMIN — Medication 125 MCG/KG/MIN: at 12:03

## 2022-01-01 RX ADMIN — DEXTROSE 1 G: 50 INJECTION, SOLUTION INTRAVENOUS at 09:04

## 2022-01-01 RX ADMIN — EPOETIN ALFA-EPBX 3460 UNITS: 2000 INJECTION, SOLUTION INTRAVENOUS; SUBCUTANEOUS at 05:04

## 2022-01-01 RX ADMIN — APIXABAN 2.5 MG: 2.5 TABLET, FILM COATED ORAL at 01:03

## 2022-01-01 RX ADMIN — INSULIN ASPART 3 UNITS: 100 INJECTION, SOLUTION INTRAVENOUS; SUBCUTANEOUS at 05:03

## 2022-01-01 RX ADMIN — VANCOMYCIN HYDROCHLORIDE 1250 MG: 1.25 INJECTION, POWDER, LYOPHILIZED, FOR SOLUTION INTRAVENOUS at 10:05

## 2022-01-01 RX ADMIN — ATORVASTATIN CALCIUM 40 MG: 40 TABLET, FILM COATED ORAL at 09:04

## 2022-01-01 RX ADMIN — NOREPINEPHRINE BITARTRATE 0.05 MCG/KG/MIN: 4 INJECTION, SOLUTION INTRAVENOUS at 09:05

## 2022-01-01 RX ADMIN — ASPIRIN 81 MG: 81 TABLET, COATED ORAL at 09:04

## 2022-01-01 RX ADMIN — PIPERACILLIN SODIUM AND TAZOBACTAM SODIUM 4.5 G: 4; .5 INJECTION, POWDER, LYOPHILIZED, FOR SOLUTION INTRAVENOUS at 09:05

## 2022-01-01 RX ADMIN — INSULIN ASPART 1 UNITS: 100 INJECTION, SOLUTION INTRAVENOUS; SUBCUTANEOUS at 06:03

## 2022-01-01 RX ADMIN — ATORVASTATIN CALCIUM 40 MG: 40 TABLET, FILM COATED ORAL at 01:03

## 2022-01-01 RX ADMIN — CYANOCOBALAMIN TAB 250 MCG 250 MCG: 250 TAB at 08:04

## 2022-01-01 RX ADMIN — APIXABAN 2.5 MG: 2.5 TABLET, FILM COATED ORAL at 09:03

## 2022-01-01 RX ADMIN — INSULIN ASPART 4 UNITS: 100 INJECTION, SOLUTION INTRAVENOUS; SUBCUTANEOUS at 05:05

## 2022-01-01 RX ADMIN — PIPERACILLIN AND TAZOBACTAM 4.5 G: 4; .5 INJECTION, POWDER, LYOPHILIZED, FOR SOLUTION INTRAVENOUS; PARENTERAL at 03:03

## 2022-01-01 RX ADMIN — Medication 100 MCG: at 12:03

## 2022-01-01 RX ADMIN — MUPIROCIN: 20 OINTMENT TOPICAL at 08:04

## 2022-01-01 RX ADMIN — DESMOPRESSIN ACETATE 30.84 MCG: 4 SOLUTION INTRAVENOUS at 11:05

## 2022-01-01 RX ADMIN — Medication 2184 UNITS: at 11:05

## 2022-01-01 RX ADMIN — CALCIUM ACETATE 667 MG: 667 CAPSULE ORAL at 08:03

## 2022-01-01 RX ADMIN — CALCIUM ACETATE 667 MG: 667 CAPSULE ORAL at 09:04

## 2022-01-01 RX ADMIN — MORPHINE SULFATE 2 MG: 2 INJECTION, SOLUTION INTRAMUSCULAR; INTRAVENOUS at 08:05

## 2022-01-01 RX ADMIN — ASPIRIN 81 MG: 81 TABLET, COATED ORAL at 01:03

## 2022-01-01 RX ADMIN — EPOETIN ALFA-EPBX 3460 UNITS: 2000 INJECTION, SOLUTION INTRAVENOUS; SUBCUTANEOUS at 11:03

## 2022-01-01 RX ADMIN — METOPROLOL TARTRATE 25 MG: 25 TABLET, FILM COATED ORAL at 10:03

## 2022-01-01 RX ADMIN — SODIUM CHLORIDE: 0.9 INJECTION, SOLUTION INTRAVENOUS at 08:03

## 2022-01-01 RX ADMIN — FENTANYL CITRATE 25 MCG: 50 INJECTION INTRAMUSCULAR; INTRAVENOUS at 12:03

## 2022-01-01 RX ADMIN — FAMOTIDINE 20 MG: 20 TABLET ORAL at 08:03

## 2022-01-01 RX ADMIN — Medication 2 MG/HR: at 08:05

## 2022-01-01 RX ADMIN — FAMOTIDINE 20 MG: 20 TABLET ORAL at 08:04

## 2022-01-01 RX ADMIN — APIXABAN 2.5 MG: 2.5 TABLET, FILM COATED ORAL at 09:04

## 2022-01-01 RX ADMIN — METOPROLOL TARTRATE 25 MG: 25 TABLET, FILM COATED ORAL at 01:03

## 2022-01-01 RX ADMIN — INSULIN HUMAN 1 UNITS/HR: 1 INJECTION, SOLUTION INTRAVENOUS at 02:05

## 2022-01-01 RX ADMIN — VANCOMYCIN HYDROCHLORIDE 750 MG: 750 INJECTION, POWDER, LYOPHILIZED, FOR SOLUTION INTRAVENOUS at 12:03

## 2022-01-01 RX ADMIN — Medication 200 MCG: at 01:03

## 2022-01-01 RX ADMIN — NOREPINEPHRINE BITARTRATE 0.12 MCG/KG/MIN: 4 INJECTION, SOLUTION INTRAVENOUS at 06:05

## 2022-01-01 RX ADMIN — CALCIUM ACETATE 667 MG: 667 CAPSULE ORAL at 05:03

## 2022-01-01 RX ADMIN — SODIUM CHLORIDE, SODIUM LACTATE, POTASSIUM CHLORIDE, AND CALCIUM CHLORIDE 1000 ML: .6; .31; .03; .02 INJECTION, SOLUTION INTRAVENOUS at 09:05

## 2022-01-01 RX ADMIN — METOPROLOL TARTRATE 25 MG: 25 TABLET, FILM COATED ORAL at 08:03

## 2022-01-01 RX ADMIN — FAMOTIDINE 20 MG: 20 TABLET ORAL at 10:03

## 2022-01-01 RX ADMIN — CLOPIDOGREL 75 MG: 75 TABLET, FILM COATED ORAL at 08:03

## 2022-01-01 RX ADMIN — CYANOCOBALAMIN TAB 250 MCG 250 MCG: 250 TAB at 09:04

## 2022-01-01 RX ADMIN — Medication 100 MCG: at 01:03

## 2022-01-01 RX ADMIN — CEFTRIAXONE 2 G: 2 INJECTION, SOLUTION INTRAVENOUS at 11:03

## 2022-01-01 RX ADMIN — CYANOCOBALAMIN TAB 250 MCG 250 MCG: 250 TAB at 08:03

## 2022-01-01 RX ADMIN — INSULIN ASPART 4 UNITS: 100 INJECTION, SOLUTION INTRAVENOUS; SUBCUTANEOUS at 12:05

## 2022-01-01 RX ADMIN — SODIUM CHLORIDE 150 ML/HR: 3 INJECTION, SOLUTION INTRAVENOUS at 12:05

## 2022-01-01 RX ADMIN — FAMOTIDINE 20 MG: 20 TABLET ORAL at 09:04

## 2022-01-01 RX ADMIN — MORPHINE SULFATE 2 MG: 2 INJECTION, SOLUTION INTRAMUSCULAR; INTRAVENOUS at 08:03

## 2022-01-01 RX ADMIN — ASPIRIN 81 MG: 81 TABLET, COATED ORAL at 08:04

## 2022-01-01 RX ADMIN — MORPHINE SULFATE 2 MG: 2 INJECTION, SOLUTION INTRAMUSCULAR; INTRAVENOUS at 01:04

## 2022-01-01 RX ADMIN — Medication 10 ML: at 11:03

## 2022-01-01 RX ADMIN — SODIUM CHLORIDE: 0.9 INJECTION, SOLUTION INTRAVENOUS at 01:04

## 2022-01-01 RX ADMIN — CALCIUM ACETATE 667 MG: 667 CAPSULE ORAL at 08:04

## 2022-01-01 RX ADMIN — SODIUM CHLORIDE 250 ML: 0.9 INJECTION, SOLUTION INTRAVENOUS at 02:05

## 2022-01-01 RX ADMIN — CLOPIDOGREL 75 MG: 75 TABLET, FILM COATED ORAL at 01:03

## 2022-01-01 RX ADMIN — APIXABAN 2.5 MG: 2.5 TABLET, FILM COATED ORAL at 08:03

## 2022-01-01 RX ADMIN — SODIUM CHLORIDE: 0.9 INJECTION, SOLUTION INTRAVENOUS at 12:03

## 2022-01-01 RX ADMIN — CALCIUM ACETATE 667 MG: 667 CAPSULE ORAL at 07:03

## 2022-01-01 RX ADMIN — PROPOFOL 30 MG: 10 INJECTION, EMULSION INTRAVENOUS at 12:03

## 2022-01-01 RX ADMIN — METOPROLOL TARTRATE 25 MG: 25 TABLET, FILM COATED ORAL at 08:04

## 2022-01-01 RX ADMIN — CALCIUM ACETATE 667 MG: 667 CAPSULE ORAL at 06:03

## 2022-01-01 RX ADMIN — Medication 5 MG/HR: at 05:05

## 2022-01-01 RX ADMIN — APIXABAN 2.5 MG: 2.5 TABLET, FILM COATED ORAL at 08:04

## 2022-01-01 RX ADMIN — DEXTROSE 1 G: 50 INJECTION, SOLUTION INTRAVENOUS at 04:03

## 2022-01-01 RX ADMIN — NOREPINEPHRINE BITARTRATE 0.04 MCG/KG/MIN: 4 INJECTION, SOLUTION INTRAVENOUS at 05:05

## 2022-01-01 RX ADMIN — VANCOMYCIN HYDROCHLORIDE 1000 MG: 1 INJECTION, POWDER, LYOPHILIZED, FOR SOLUTION INTRAVENOUS at 12:03

## 2022-01-01 RX ADMIN — CLOPIDOGREL 75 MG: 75 TABLET, FILM COATED ORAL at 09:04

## 2022-01-01 RX ADMIN — ASPIRIN 81 MG: 81 TABLET, COATED ORAL at 08:03

## 2022-01-01 RX ADMIN — Medication 6 MG: at 08:03

## 2022-01-01 RX ADMIN — ETOMIDATE 20 MG: 2 INJECTION INTRAVENOUS at 08:05

## 2022-01-01 RX ADMIN — METOPROLOL TARTRATE 25 MG: 25 TABLET, FILM COATED ORAL at 09:04

## 2022-01-01 RX ADMIN — CYANOCOBALAMIN TAB 250 MCG 250 MCG: 250 TAB at 10:03

## 2022-01-01 RX ADMIN — IOHEXOL 75 ML: 350 INJECTION, SOLUTION INTRAVENOUS at 09:05

## 2022-01-01 RX ADMIN — LIDOCAINE HYDROCHLORIDE 50 MG: 20 INJECTION, SOLUTION EPIDURAL; INFILTRATION; INTRACAUDAL at 12:03

## 2022-01-01 RX ADMIN — LEVETIRACETAM 500 MG: 500 INJECTION, SOLUTION INTRAVENOUS at 05:05

## 2022-01-01 RX ADMIN — PIPERACILLIN SODIUM AND TAZOBACTAM SODIUM 4.5 G: 4; .5 INJECTION, POWDER, LYOPHILIZED, FOR SOLUTION INTRAVENOUS at 10:05

## 2022-01-01 RX ADMIN — ATORVASTATIN CALCIUM 40 MG: 40 TABLET, FILM COATED ORAL at 08:04

## 2022-01-01 RX ADMIN — MANNITOL 50 G: 12.5 INJECTION, SOLUTION INTRAVENOUS at 11:05

## 2022-01-01 RX ADMIN — ATORVASTATIN CALCIUM 40 MG: 40 TABLET, FILM COATED ORAL at 08:03

## 2022-01-01 RX ADMIN — CLOPIDOGREL 75 MG: 75 TABLET, FILM COATED ORAL at 08:04

## 2022-01-01 RX ADMIN — ATORVASTATIN CALCIUM 40 MG: 40 TABLET, FILM COATED ORAL at 10:03

## 2022-03-29 PROBLEM — E87.5 HYPERKALEMIA: Status: RESOLVED | Noted: 2021-01-01 | Resolved: 2022-01-01

## 2022-03-29 PROBLEM — E11.21 TYPE 2 DIABETES MELLITUS WITH DIABETIC NEPHROPATHY, WITH LONG-TERM CURRENT USE OF INSULIN: Status: ACTIVE | Noted: 2019-03-31

## 2022-03-29 PROBLEM — Z79.4 TYPE 2 DIABETES MELLITUS WITH DIABETIC NEPHROPATHY, WITH LONG-TERM CURRENT USE OF INSULIN: Status: ACTIVE | Noted: 2019-03-31

## 2022-03-29 PROBLEM — L03.032 CELLULITIS OF TOE OF LEFT FOOT: Status: ACTIVE | Noted: 2022-01-01

## 2022-03-29 PROBLEM — S42.201A CLOSED FRACTURE OF PROXIMAL END OF RIGHT HUMERUS: Status: RESOLVED | Noted: 2021-01-01 | Resolved: 2022-01-01

## 2022-03-29 PROBLEM — D62 ACUTE BLOOD LOSS ANEMIA: Status: RESOLVED | Noted: 2021-01-01 | Resolved: 2022-01-01

## 2022-03-29 NOTE — H&P
Gregory Levy - Telemetry [facility] ([address] Medicine  History & Physical    Patient Name: Kavya Figueroa  MRN: [id number]  Patient Class: OP- Observation  Admission Date: 3/28/2022  Attending Physician: Jeri Kc MD   Primary Care Provider: Neeru Benites MD         Patient information was obtained from nursing home, past medical records and ER records.     Subjective:     Principal Problem:Cellulitis of toe of left foot    Chief Complaint:   Chief Complaint   Patient presents with    Toe Pain     From [facility], c/o toe pain since Friday. Per EMS, dressing in place, small amount of blood noted to dressing. Pt does not ambulate at baseline. Per EMS, pt speaking to someone who is not there, unsure of apteints baseline mentation        HPI: 68-year-old woman with dementia sent to the ED from [facility] nursing home for infection of left great toe.  She is unable to provide much useful history due to dementia, so history largely obtained from ED notes and nursing home records.  Her left great toe was noted to have been swollen with an area of inflammation extending from the toenail bed so she was sent to the ED for evaluation.      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    B12 deficiency 5/10/2021    Chronic diastolic congestive heart failure 12/18/2019    Dyslipidemia 3/31/2019    ESRD on hemodialysis 5/7/2021    Essential hypertension 3/31/2019    GERD (gastroesophageal reflux disease) 7/23/2021    History of myocardial infarction 7/23/2021    Secondary hyperparathyroidism of renal origin 3/31/2019    Type 2 diabetes mellitus, without long-term current use of insulin 3/31/2019       Past Surgical History:   Procedure Laterality Date    DECLOTTING OF VASCULAR GRAFT Left 5/26/2021    Procedure: DECLOT-GRAFT;  Surgeon: REYNALDO Watters II, MD;  Location: [facility] OR [address] [place] [address];  Service: Cardiovascular;  Laterality: Left;  10.5 min  59.97  mGy  9.4910 Gy.cm  20ml Dye    FISTULOTOMY      VENOPLASTY Left 5/26/2021    Procedure: ANGIOPLASTY, VEIN;  Surgeon: REYNALDO Watters II, MD;  Location: [facility] OR [address] [place] [address];  Service: Cardiovascular;  Laterality: Left;  balloon angioplasty       Review of patient's allergies indicates:  No Known Allergies    No current facility-administered medications on file prior to encounter.     Current Outpatient Medications on File Prior to Encounter   Medication Sig    acetaminophen (TYLENOL) 325 MG tablet Take 2 tablets (650 mg total) by mouth every 6 (six) hours as needed (for pain scale 1-4 or fever over 101).    apixaban (ELIQUIS) 2.5 mg Tab Take 1 tablet (2.5 mg total) by mouth 2 (two) times daily.    aspirin (ECOTRIN) 81 MG EC tablet Take 1 tablet (81 mg total) by mouth once daily.    atorvastatin (LIPITOR) 40 MG tablet Take 1 tablet (40 mg total) by mouth once daily.    calcium acetate,phosphat bind, (PHOSLO) 667 mg tablet Take 1 tablet (667 mg total) by mouth 3 (three) times daily with meals.    clopidogreL (PLAVIX) 75 mg tablet Take 1 tablet (75 mg total) by mouth once daily.    cyanocobalamin (VITAMIN B-12) 250 MCG tablet Take 1 tablet (250 mcg total) by mouth once daily.    famotidine (PEPCID) 20 MG tablet Take 1 tablet (20 mg total) by mouth once daily.    metoprolol tartrate (LOPRESSOR) 25 MG tablet Take 1 tablet (25 mg total) by mouth once daily.    polyethylene glycol (GLYCOLAX) 17 gram PwPk Take 17 g by mouth 2 (two) times daily as needed (Constipation).    [DISCONTINUED] QUEtiapine (SEROQUEL) 25 MG Tab Take 1 tablet (25 mg total) by mouth nightly as needed (agitation).     Family History    None       Tobacco Use    Smoking status: Never Smoker    Smokeless tobacco: Never Used   Substance and Sexual Activity    Alcohol use: Never    Drug use: Never    Sexual activity: Not Currently     Review of Systems   Unable to perform ROS: Dementia   Objective:     Vital Signs (Most Recent):  Temp: 98.6  °F (37 °C) (03/29/22 0519)  Pulse: 100 (03/29/22 0519)  Resp: 18 (03/29/22 0519)  BP: (!) 139/90 (03/29/22 0519)  SpO2: 98 % (03/29/22 0519)   Vital Signs (24h Range):  Temp:  [98.6 °F (37 °C)-100.3 °F (37.9 °C)] 98.6 °F (37 °C)  Pulse:  [] 100  Resp:  [18-20] 18  SpO2:  [96 %-100 %] 98 %  BP: (125-139)/(58-90) 139/90     Weight: 69.2 kg (152 lb 8.9 oz)  Body mass index is 24.62 kg/m².    Physical Exam  Vitals and nursing note reviewed.   Constitutional:       General: She is sleeping. She is not in acute distress.     Appearance: She is normal weight. She is not toxic-appearing.   HENT:      Head: Normocephalic and atraumatic.      Nose: Nose normal.      Mouth/Throat:      Mouth: Mucous membranes are moist.      Pharynx: Oropharynx is clear.   Eyes:      General: No scleral icterus.     Extraocular Movements: Extraocular movements intact.      Conjunctiva/sclera: Conjunctivae normal.      Pupils: Pupils are equal, round, and reactive to light.   Cardiovascular:      Rate and Rhythm: Tachycardia present.      Heart sounds: Murmur (thrill from fistula ausculable up to heart) heard.     No gallop.   Pulmonary:      Effort: Pulmonary effort is normal.      Breath sounds: Normal breath sounds.   Abdominal:      General: Bowel sounds are normal. There is no distension.      Palpations: Abdomen is soft.      Tenderness: There is no abdominal tenderness. There is no guarding.   Musculoskeletal:         General: Swelling (left great toe) present. No tenderness or signs of injury. Normal range of motion.      Cervical back: Normal range of motion and neck supple. No rigidity.   Feet:      Comments: All toenails are severely ingrown and dysmorphic and have not been trimmed in a very long time.  There is a small wound at the lateral nail bed of the left great toe with purulence.  The left great toe is notably swollen and erythematous.  Lymphadenopathy:      Cervical: No cervical adenopathy.   Skin:     General: Skin is  warm and dry.      Findings: Erythema (left great toe) present.      Comments: Very dry, flaky skin on feet   Neurological:      Mental Status: She is easily aroused. She is disoriented.      Cranial Nerves: No cranial nerve deficit.         CRANIAL NERVES     CN III, IV, VI   Pupils are equal, round, and reactive to light.     Significant Labs: All pertinent labs within the past 24 hours have been reviewed.  A1C: No results for input(s): HGBA1C in the last 4320 hours.  CBC:   Recent Labs   Lab 03/28/22  2152   WBC 5.82   HGB 7.1*   HCT 23.2*        CMP:   Recent Labs   Lab 03/28/22  2152      K 4.3   CL 98   CO2 31*   *   BUN 21   CREATININE 4.7*   CALCIUM 9.1   PROT 6.3   ALBUMIN 2.6*   BILITOT 0.4   ALKPHOS 79   AST 7*   ALT <5*   ANIONGAP 11   EGFRNONAA 8.3*       Significant Imaging: I have reviewed all pertinent imaging results/findings within the past 24 hours.    Assessment/Plan:     * Cellulitis of toe of left foot  Podiatry consulted for further evaluation.  She will likely need extensive trimming of her toenails, which are the likely cause of the wound resulting in the infection of her toe.  Vancomycin started in the ED and will be dosed per pharmacy.  X-ray of the foot not suggestive of osteomyelitis; will defer to Podiatry regarding whether MRI should be pursued.  Inflammatory markers significantly elevated; however they seem to be chronically so.      ESRD on hemodialysis  Nephrology consulted for dialysis; unclear if she got dialysis prior to being sent to the ED.      Anemia due to end stage renal disease  Hemoglobin nearly at transfusion threshold, but patient not able to consent.  Will need to reach surrogate decision maker in the morning to consent for blood if transfusion is indicated.  Will repeat CBC in a.m. to reassess and make sure that the ED cbc was not diluted.      Type 2 diabetes mellitus with diabetic nephropathy, with long-term current use of insulin  Per nursing  home MAR, appears to only be on sliding scale insulin.  Last hemoglobin A1c 5.9.  Renal diet ordered; she will not likely need supplemental insulin due to decreased renal clearance in ESRD.        VTE Risk Mitigation (From admission, onward)         Ordered     apixaban tablet 2.5 mg  2 times daily         03/29/22 0311     Reason for No Pharmacological VTE Prophylaxis  Once        Question:  Reasons:  Answer:  Already adequately anticoagulated on oral Anticoagulants    03/29/22 0311     IP VTE HIGH RISK PATIENT  Once         03/29/22 0311     Place sequential compression device  Until discontinued         03/29/22 0311                   Tj Moody MD  Department of Hospital Medicine   [facility] - Telemetry Stepdown ([place]-)

## 2022-03-29 NOTE — SUBJECTIVE & OBJECTIVE
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    B12 deficiency 5/10/2021    Chronic diastolic congestive heart failure 12/18/2019    Dyslipidemia 3/31/2019    ESRD on hemodialysis 5/7/2021    Essential hypertension 3/31/2019    GERD (gastroesophageal reflux disease) 7/23/2021    History of myocardial infarction 7/23/2021    Secondary hyperparathyroidism of renal origin 3/31/2019    Type 2 diabetes mellitus, without long-term current use of insulin 3/31/2019       Past Surgical History:   Procedure Laterality Date    DECLOTTING OF VASCULAR GRAFT Left 5/26/2021    Procedure: DECLOT-GRAFT;  Surgeon: REYNALDO Watters II, MD;  Location: [facility] OR [address] [place] [address];  Service: Cardiovascular;  Laterality: Left;  10.5 min  59.97 mGy  9.4910 Gy.cm  20ml Dye    FISTULOTOMY      VENOPLASTY Left 5/26/2021    Procedure: ANGIOPLASTY, VEIN;  Surgeon: REYNALDO Watters II, MD;  Location: [facility] OR [address] [place] [address];  Service: Cardiovascular;  Laterality: Left;  balloon angioplasty       Review of patient's allergies indicates:  No Known Allergies  Current Facility-Administered Medications   Medication Frequency    [START ON 3/30/2022] 0.9%  NaCl infusion PRN    acetaminophen tablet 650 mg Q8H PRN    apixaban tablet 2.5 mg BID    aspirin EC tablet 81 mg Daily    atorvastatin tablet 40 mg Daily    calcium acetate(phosphat bind) capsule 667 mg TID WM    clopidogreL tablet 75 mg Daily    cyanocobalamin tablet 250 mcg Daily    dextrose 10% bolus 125 mL PRN    dextrose 10% bolus 250 mL PRN    [START ON 3/30/2022] epoetin aston-epbx injection 3,460 Units Every Mon, Wed, Fri    famotidine tablet 20 mg Daily    glucagon (human recombinant) injection 1 mg PRN    glucose chewable tablet 16 g PRN    glucose chewable tablet 24 g PRN    [START ON 3/30/2022] heparin (porcine) injection 1,000 Units PRN    insulin aspart U-100 pen 0-5 Units QID (AC + HS) PRN    LIDOcaine HCL 10 mg/ml (1%) injection 10 mL Once    melatonin tablet 6 mg Nightly PRN     metoprolol tartrate (LOPRESSOR) tablet 25 mg Daily    morphine injection 2 mg Q4H PRN    ondansetron injection 4 mg Q8H PRN    piperacillin-tazobactam 4.5 g in sodium chloride 0.9% 100 mL IVPB (ready to mix system) Q12H    polyethylene glycol packet 17 g BID PRN    sodium chloride 0.9% bolus 250 mL PRN    sodium chloride 0.9% flush 10 mL Q12H PRN    vancomycin - pharmacy to dose pharmacy to manage frequency     Family History    None       Tobacco Use    Smoking status: Never Smoker    Smokeless tobacco: Never Used   Substance and Sexual Activity    Alcohol use: Never    Drug use: Never    Sexual activity: Not Currently     Review of Systems   Respiratory:  Negative for chest tightness and shortness of breath.    Cardiovascular:  Negative for chest pain and leg swelling.   Gastrointestinal:  Negative for abdominal distention, abdominal pain, diarrhea, nausea and vomiting.   Genitourinary:  Negative for flank pain.   Objective:     Vital Signs (Most Recent):  Temp: 98.1 °F (36.7 °C) (03/29/22 1159)  Pulse: 85 (03/29/22 1159)  Resp: 17 (03/29/22 1159)  BP: 130/61 (03/29/22 1159)  SpO2: 100 % (03/29/22 1159)  O2 Device (Oxygen Therapy): room air (03/29/22 0519) Vital Signs (24h Range):  Temp:  [96 °F (35.6 °C)-100.3 °F (37.9 °C)] 98.1 °F (36.7 °C)  Pulse:  [] 85  Resp:  [17-20] 17  SpO2:  [96 %-100 %] 100 %  BP: (125-140)/(58-90) 130/61     Weight: 69.2 kg (152 lb 8.9 oz) (03/29/22 0519)  Body mass index is 24.62 kg/m².  Body surface area is 1.8 meters squared.    I/O last 3 completed shifts:  In: 300 [IV Piggyback:300]  Out: -     Physical Exam  HENT:      Head: Normocephalic and atraumatic.      Mouth/Throat:      Mouth: Mucous membranes are moist.   Cardiovascular:      Rate and Rhythm: Normal rate.      Comments: LUE AVF  Pulmonary:      Effort: Pulmonary effort is normal. No respiratory distress.   Abdominal:      Palpations: Abdomen is soft.   Musculoskeletal:      Right lower leg: No edema.      Left  lower leg: No edema.   Skin:     General: Skin is warm and dry.   Neurological:      Mental Status: She is alert.       Significant Labs:  CBC:   Recent Labs   Lab 03/29/22  0531   WBC 5.36   RBC 2.42*   HGB 6.8*   HCT 22.1*      MCV 91   MCH 28.1   MCHC 30.8*     CMP:   Recent Labs   Lab 03/28/22  2152 03/29/22  0531   * 221*   CALCIUM 9.1 8.9   ALBUMIN 2.6*  --    PROT 6.3  --     140   K 4.3 4.2   CO2 31* 31*   CL 98 99   BUN 21 23   CREATININE 4.7* 5.0*   ALKPHOS 79  --    ALT <5*  --    AST 7*  --    BILITOT 0.4  --

## 2022-03-29 NOTE — ASSESSMENT & PLAN NOTE
Nephrology History  iHD Schedule: MWF   Unit/MD: [facility], Dr. Allen  EDW: ?  Access: LUE AVF    Will schedule dialysis tomorrow for metabolic clearance and volume management.   Dialysate adjusted to current labs   Continue to monitor intake and output, daily weights   Avoid nephrotoxic medication and renal dose medications to GFR  Strict I/Os    Anemia of Chronic Kidney Disease   Hgb goal in ESRD is Hgb of 10-11.   Check iron studies in am.   Epo with HD as per outpatient schedule   Transfuse for Hg <7     Lab Results   Component Value Date    IRON 44 05/09/2021    TIBC 182 (L) 05/09/2021    FESATURATED 24 05/09/2021    HGB 6.8 (L) 03/29/2022    MCV 91 03/29/2022       Mineral Bone Disease in CKD   Renal diet if not NPO   Continue home phos binder   Daily RFP  Vitamin D and PTH to be checked with am labs.     Lab Results   Component Value Date    CALCIUM 8.9 03/29/2022    PHOS 4.3 03/29/2022    [id number] 17 (L) 05/15/2021

## 2022-03-29 NOTE — ASSESSMENT & PLAN NOTE
Left hallux nail was avulsed with underlying wound which probes to distal phalanx bone, concerning for osteomyelitis.     Plan:  Deep wound cultures obtained. Follow up results for antibiotic tailoring  Continue empiric IV Vanc/Zosyn   Follow up MRI to confirm osteomyelitis and to assess the extent of bony involvement for surgical planning  Will call patient's daughter and discuss treatment options consisting of either bone biopsy with long term IV antibiotics vs definitive amputation of the L great toe.   Continue local wound care in the interim with betadine. Nursing orders placed for wound care.   Podiatry will continue to follow

## 2022-03-29 NOTE — CONSULTS
Gregory Levy - Telemetry Stepdown ([place] [id number])  Podiatry  Consult Note    Patient Name: Kavya Figueroa  MRN: [id number]  Admission Date: 3/28/2022  Hospital Length of Stay: 0 days  Attending Physician: Jeri Kc MD  Primary Care Provider: Neeru Benites MD     Podiatry  Consult performed by: Kary Arteaga MD  Consult ordered by: Tj Moody MD        Subjective:     History of Present Illness:  76 y.o female with PMHx of ESRD on HD MWF, HTN, Type 2 DM, dementia, who was sent to [facility] ED 3/28/2022 from [facility] due to concern for L great toe infection. HPI limited due to patient's dementia. Patient states her left great toe feels sore.    On initial presentation, patient afebrile, vital signs stable. WBC 5.82, inflammatory markers elevated with .3 and ESR 76. Xray left foot with vascular atherosclerosis. No acute fracture, subluxation or dislocation.  No foreign body, soft tissue gas, or osteomyelitis appreciated.       Scheduled Meds:   apixaban  2.5 mg Oral BID    aspirin  81 mg Oral Daily    atorvastatin  40 mg Oral Daily    calcium acetate(phosphat bind)  667 mg Oral TID WM    clopidogreL  75 mg Oral Daily    cyanocobalamin  250 mcg Oral Daily    [START ON 3/30/2022] epoetin aston-epbx  50 Units/kg Subcutaneous Every Mon, Wed, Fri    famotidine  20 mg Oral Daily    LIDOcaine HCL 10 mg/ml (1%)  10 mL Other Once    metoprolol tartrate  25 mg Oral Daily     Continuous Infusions:  PRN Meds:[START ON 3/30/2022] sodium chloride 0.9%, acetaminophen, dextrose 10%, dextrose 10%, glucagon (human recombinant), glucose, glucose, [START ON 3/30/2022] heparin (porcine), insulin aspart U-100, melatonin, morphine, ondansetron, polyethylene glycol, sodium chloride 0.9%, sodium chloride 0.9%, Pharmacy to dose Vancomycin consult **AND** vancomycin - pharmacy to dose    Review of patient's allergies indicates:  No Known Allergies     Past Medical History:   Diagnosis Date     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    B12 deficiency 5/10/2021    Chronic diastolic congestive heart failure 12/18/2019    Dyslipidemia 3/31/2019    ESRD on hemodialysis 5/7/2021    Essential hypertension 3/31/2019    GERD (gastroesophageal reflux disease) 7/23/2021    History of myocardial infarction 7/23/2021    Secondary hyperparathyroidism of renal origin 3/31/2019    Type 2 diabetes mellitus, without long-term current use of insulin 3/31/2019     Past Surgical History:   Procedure Laterality Date    DECLOTTING OF VASCULAR GRAFT Left 5/26/2021    Procedure: DECLOT-GRAFT;  Surgeon: REYNALDO Watters II, MD;  Location: [facility] OR [address] [place] [address];  Service: Cardiovascular;  Laterality: Left;  10.5 min  59.97 mGy  9.4910 Gy.cm  20ml Dye    FISTULOTOMY      VENOPLASTY Left 5/26/2021    Procedure: ANGIOPLASTY, VEIN;  Surgeon: REYNALDO Watters II, MD;  Location: [facility] OR [address] [place] [address];  Service: Cardiovascular;  Laterality: Left;  balloon angioplasty       Family History    None       Tobacco Use    Smoking status: Never Smoker    Smokeless tobacco: Never Used   Substance and Sexual Activity    Alcohol use: Never    Drug use: Never    Sexual activity: Not Currently     Review of Systems   Unable to perform ROS: Dementia   Constitutional:  Negative for fever.   Musculoskeletal:  Positive for arthralgias, gait problem and myalgias.   Skin:  Positive for wound.   Objective:     Vital Signs (Most Recent):  Temp: 98.1 °F (36.7 °C) (03/29/22 1159)  Pulse: 85 (03/29/22 1159)  Resp: 17 (03/29/22 1159)  BP: 130/61 (03/29/22 1159)  SpO2: 100 % (03/29/22 1159) Vital Signs (24h Range):  Temp:  [96 °F (35.6 °C)-100.3 °F (37.9 °C)] 98.1 °F (36.7 °C)  Pulse:  [] 85  Resp:  [17-20] 17  SpO2:  [96 %-100 %] 100 %  BP: (125-140)/(58-90) 130/61     Weight: 69.2 kg (152 lb 8.9 oz)  Body mass index is 24.62 kg/m².    Foot Exam    Right Foot/Ankle     Inspection and Palpation  Tenderness: none   Swelling: none   Skin Exam: skin intact; no  drainage, no cellulitis, no ulcer and no erythema     Neurovascular  Dorsalis pedis: 2+  Posterior tibial: 1+      Left Foot/Ankle      Inspection and Palpation  Tenderness: (L great toe)  Swelling: (localized edema to the left great toe)  Skin Exam: drainage, cellulitis, skin changes, ulcer and erythema; skin not intact     Neurovascular  Dorsalis pedis: 2+  Posterior tibial: 1+        Laboratory:  CBC:   Recent Labs   Lab 03/29/22  0531   WBC 5.36   RBC 2.42*   HGB 6.8*   HCT 22.1*      MCV 91   MCH 28.1   MCHC 30.8*     CRP:   Recent Labs   Lab 03/28/22  2152   .3*     ESR:   Recent Labs   Lab 03/28/22 2152   SEDRATE 76*     Wound Cultures: No results for input(s): LABAERO in the last 4320 hours.  Microbiology Results (last 7 days)       Procedure Component Value Units Date/Time    Culture, Anaerobe [[id number]] Collected: 03/29/22 1217    Order Status: Sent Specimen: Wound from Toe, Left Foot Updated: 03/29/22 1218    Aerobic culture [[id number]] Collected: 03/29/22 1217    Order Status: Sent Specimen: Wound from Toe, Left Foot Updated: 03/29/22 1218    Gram stain [[id number]] Collected: 03/29/22 1217    Order Status: Sent Specimen: Wound from Toe, Left Foot Updated: 03/29/22 1218          Specimen (24h ago, onward)                None            Diagnostic Results:  I have reviewed all pertinent imaging results/findings within the past 24 hours.  Imaging Results              X-Ray Foot Complete Left (Final result)  Result time 03/28/22 23:19:18      Final result by Madhu Sandhu MD (03/28/22 23:19:18)                   Impression:      No acute radiographic abnormality.      Electronically signed by: Madhu Sandhu  Date:    03/28/2022  Time:    23:19               Narrative:    EXAMINATION:  XR FOOT COMPLETE 3 VIEW LEFT    CLINICAL HISTORY:  .  Pain in unspecified foot    TECHNIQUE:  AP, lateral and oblique views of the left foot were performed.    COMPARISON:  None    FINDINGS:  Vascular  atherosclerosis.    No acute fracture, subluxation or dislocation.  Calcaneal spurring inferiorly and posteriorly.    Mild degenerative changes of the 1st metatarsophalangeal joint and interphalangeal joints.  Mild osteophytic spurring of the dorsum of the foot also.                                       X-Ray Chest AP Portable (Final result)  Result time 03/28/22 22:23:46      Final result by Madhu Sandhu MD (03/28/22 22:23:46)                   Impression:      Minimal atelectasis or infiltrate at the right lung base.      Electronically signed by: Madhu Sandhu  Date:    03/28/2022  Time:    22:23               Narrative:    EXAMINATION:  XR CHEST AP PORTABLE    CLINICAL HISTORY:  Chest Pain;    TECHNIQUE:  Single frontal view of the chest was performed.    COMPARISON:  09/14/2021, 05/07/2021    FINDINGS:  Minimal atelectasis or infiltrate at the right lung base.    The lungs are otherwise clear, with normal appearance of pulmonary vasculature and no pleural effusion or pneumothorax.    The cardiac silhouette is normal in size. Stable mild prominence of the right hilar vasculature, similar to prior studies.    Bones are intact.                                        Clinical Findings:  Nails 1-5 bilateral foot are significantly elongated 6-7 mm.  Left hallux nail is dystrophic with significant loosening and underlying malodorous purulence. Localized erythema and edema to the left hallux. Nails 2-5 bilateral are also yellow discoloration mild loosening underlying debris.    Upon removal of the left hallux nail, nail bed was necrotic with open wound that probes to distal phalanx bone. +Malodor and purulent drainage.               Assessment/Plan:     * Cellulitis of toe of left foot  Left hallux nail was avulsed with underlying wound which probes to distal phalanx bone, concerning for osteomyelitis.     Plan:  Deep wound cultures obtained. Follow up results for antibiotic tailoring  Continue empiric IV  Colin/Zosyn   Follow up MRI to confirm osteomyelitis and to assess the extent of bony involvement for surgical planning  Will call patient's daughter and discuss treatment options consisting of either bone biopsy with long term IV antibiotics vs definitive amputation of the L great toe.   Continue local wound care in the interim with betadine. Nursing orders placed for wound care.   Podiatry will continue to follow        Thank you for your consult. I will follow-up with patient. Please contact us if you have any additional questions.    Kary Arteaga DPM  [facility]  Podiatry PGY3  Pager: [phone]  Spectra:[phone]

## 2022-03-29 NOTE — ED PROVIDER NOTES
Encounter Date: 3/28/2022       History     Chief Complaint   Patient presents with    Toe Pain     From [facility], c/o toe pain since Friday. Per EMS, dressing in place, small amount of blood noted to dressing. Pt does not ambulate at baseline. 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 Pt has dementia and is confused and only oriented to self at baseline therefor poor historian. Majority of information obtained from [facility] nursing staff.  Pt was complaining of chest burning and left big toe pain. ON discussion with [facility] charge nurse, pts nurse was concerned about left big toe infection and pt complaining of pain. They had wound care and the house Doctor examine the patient and they made the decision to have the patient brought to the ED for evaluation. Pt denying any SOB, headaches, fever, chills, NVD, visual changes, or abdominal pain.         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    B12 deficiency 5/10/2021    Chronic diastolic congestive heart failure 12/18/2019    Dyslipidemia 3/31/2019    ESRD on hemodialysis 5/7/2021    Essential hypertension 3/31/2019    GERD (gastroesophageal reflux disease) 7/23/2021    History of myocardial infarction 7/23/2021    Secondary hyperparathyroidism of renal origin 3/31/2019    Type 2 diabetes mellitus, without long-term current use of insulin 3/31/2019     Past Surgical History:   Procedure Laterality Date    DECLOTTING OF VASCULAR GRAFT Left 5/26/2021    Procedure: DECLOT-GRAFT;  Surgeon: REYNALDO Watters II, MD;  Location: [facility] OR [address] [place] [address];  Service: Cardiovascular;  Laterality: Left;  10.5 min  59.97 mGy  9.4910  Gy.cm  20ml Dye    FISTULOTOMY      VENOPLASTY Left 5/26/2021    Procedure: ANGIOPLASTY, VEIN;  Surgeon: REYNALDO Watters II, MD;  Location: [facility] OR [address] [place] [address];  Service: Cardiovascular;  Laterality: Left;  balloon angioplasty     No family history on file.  Social History     Tobacco Use    Smoking status: Never Smoker    Smokeless tobacco: Never Used   Substance Use Topics    Alcohol use: Never    Drug use: Never     Review of Systems   Constitutional: Negative for chills, fatigue and fever.   HENT: Negative.    Eyes: Negative.    Respiratory: Negative for cough, chest tightness and shortness of breath.    Cardiovascular: Positive for chest pain. Negative for palpitations and leg swelling.   Gastrointestinal: Negative.    Endocrine: Negative.    Genitourinary: Negative.    Musculoskeletal: Positive for joint swelling (left big toe).   Skin: Positive for color change and wound.   Allergic/Immunologic: Negative.    Neurological: Negative for syncope, speech difficulty, numbness and headaches.   Hematological: Negative.    Psychiatric/Behavioral: Positive for confusion.       Physical Exam     Initial Vitals   BP Pulse Resp Temp SpO2   03/28/22 1909 03/28/22 1909 03/28/22 1909 03/28/22 1910 03/28/22 1909   136/72 74 18 100.3 °F (37.9 °C) 97 %      MAP       --                Physical Exam    Constitutional: Vital signs are normal. She appears well-nourished. She appears ill.   HENT:   Head: Normocephalic and atraumatic.   Eyes: Conjunctivae and EOM are normal. Pupils are equal, round, and reactive to light.   Cardiovascular: Normal rate, regular rhythm and intact distal pulses. Exam reveals no gallop and no friction rub.    Murmur (systolic) heard.  Pulmonary/Chest: No respiratory distress. She has rhonchi. She has rales (course rales bl in mid and lower lung fields). She exhibits no tenderness.   Abdominal: Abdomen is soft. Bowel sounds are normal. She exhibits no distension. There is no abdominal tenderness.    Musculoskeletal:         General: Edema (left big toe ) present.      Comments: Left big toe with erythema, warmth, and purulence around nail bed extending into dorsal surface of toe      Neurological: She is alert. No cranial nerve deficit.   Skin:   Unkept nails in bl feet. onychomycosis appearing.    Psychiatric: She has a normal mood and affect.         ED Course   Procedures  Labs Reviewed   CBC W/ AUTO DIFFERENTIAL - Abnormal; Notable for the following components:       Result Value    RBC 2.53 (*)     Hemoglobin 7.1 (*)     Hematocrit 23.2 (*)     MCHC 30.6 (*)     MPV 13.0 (*)     Lymph # 0.7 (*)     Gran % 75.9 (*)     Lymph % 12.5 (*)     All other components within normal limits   COMPREHENSIVE METABOLIC PANEL - Abnormal; Notable for the following components:    CO2 31 (*)     Glucose 236 (*)     Creatinine 4.7 (*)     Albumin 2.6 (*)     AST 7 (*)     ALT <5 (*)     eGFR if  9.6 (*)     eGFR if non  8.3 (*)     All other components within normal limits   TROPONIN I - Abnormal; Notable for the following components:    Troponin I 0.032 (*)     All other components within normal limits   B-TYPE NATRIURETIC PEPTIDE - Abnormal; Notable for the following components:    BNP 1,790 (*)     All other components within normal limits   SEDIMENTATION RATE - Abnormal; Notable for the following components:    Sed Rate 76 (*)     All other components within normal limits   C-REACTIVE PROTEIN - Abnormal; Notable for the following components:    .3 (*)     All other components within normal limits   TROPONIN I   URINALYSIS, REFLEX TO URINE CULTURE   SARS-COV-2 RDRP GENE   POCT GLUCOSE MONITORING CONTINUOUS     EKG Readings: (Independently Interpreted)   Initial Reading: No STEMI.   NSR       Imaging Results          X-Ray Foot Complete Left (Final result)  Result time 03/28/22 23:19:18    Final result by Madhu Sandhu MD (03/28/22 23:19:18)                 Impression:      No  acute radiographic abnormality.      Electronically signed by: Madhu Sandhu  Date:    03/28/2022  Time:    23:19             Narrative:    EXAMINATION:  XR FOOT COMPLETE 3 VIEW LEFT    CLINICAL HISTORY:  .  Pain in unspecified foot    TECHNIQUE:  AP, lateral and oblique views of the left foot were performed.    COMPARISON:  None    FINDINGS:  Vascular atherosclerosis.    No acute fracture, subluxation or dislocation.  Calcaneal spurring inferiorly and posteriorly.    Mild degenerative changes of the 1st metatarsophalangeal joint and interphalangeal joints.  Mild osteophytic spurring of the dorsum of the foot also.                               X-Ray Chest AP Portable (Final result)  Result time 03/28/22 22:23:46    Final result by Madhu Sandhu MD (03/28/22 22:23:46)                 Impression:      Minimal atelectasis or infiltrate at the right lung base.      Electronically signed by: Madhu Sandhu  Date:    03/28/2022  Time:    22:23             Narrative:    EXAMINATION:  XR CHEST AP PORTABLE    CLINICAL HISTORY:  Chest Pain;    TECHNIQUE:  Single frontal view of the chest was performed.    COMPARISON:  09/14/2021, 05/07/2021    FINDINGS:  Minimal atelectasis or infiltrate at the right lung base.    The lungs are otherwise clear, with normal appearance of pulmonary vasculature and no pleural effusion or pneumothorax.    The cardiac silhouette is normal in size. Stable mild prominence of the right hilar vasculature, similar to prior studies.    Bones are intact.                                 Medications   vancomycin - pharmacy to dose (has no administration in time range)   apixaban tablet 2.5 mg (has no administration in time range)   aspirin EC tablet 81 mg (has no administration in time range)   atorvastatin tablet 40 mg (has no administration in time range)   calcium acetate(phosphat bind) capsule 667 mg (has no administration in time range)   clopidogreL tablet 75 mg (has no administration in time  range)   cyanocobalamin tablet 250 mcg (has no administration in time range)   famotidine tablet 20 mg (has no administration in time range)   metoprolol tartrate (LOPRESSOR) tablet 25 mg (has no administration in time range)   polyethylene glycol packet 17 g (has no administration in time range)   glucose chewable tablet 16 g (has no administration in time range)   glucose chewable tablet 24 g (has no administration in time range)   glucagon (human recombinant) injection 1 mg (has no administration in time range)   sodium chloride 0.9% flush 10 mL (has no administration in time range)   acetaminophen tablet 650 mg (has no administration in time range)   morphine injection 2 mg (2 mg Intravenous Given 3/29/22 0554)   ondansetron injection 4 mg (has no administration in time range)   insulin aspart U-100 pen 0-5 Units (1 Units Subcutaneous Given 3/29/22 0610)   melatonin tablet 6 mg (has no administration in time range)   dextrose 10% bolus 125 mL (has no administration in time range)   dextrose 10% bolus 250 mL (has no administration in time range)   cefTRIAXone (ROCEPHIN) 2 g/50 mL D5W IVPB (0 g Intravenous Stopped 3/29/22 0030)   vancomycin in dextrose 5 % 1 gram/250 mL IVPB 1,000 mg (0 mg/kg × 55.7 kg Intravenous Stopped 3/29/22 0210)     Medical Decision Making:   History:   I obtained history from: EMS provider.  Old Medical Records: I decided to obtain old medical records.  Initial Assessment:   Pt arriving from nursing home with concerns of left foot/left big toe pain. On exam, pt does have erythema, purulence, and warmth around nail bed of left big toe concerning for nail bed infection. Will need to rule out osteomyelitis. Pt also complaining of chest burning and has course rales in mid and lower lung fields so fluid overload, ACS, and PNA are also on the differential. Pt has dementia at baseline but is confused on arrival so will also get U/A to rule out urinary infectious source. Will also rule out  Covid-19 with swab.   Differential Diagnosis:   Osteomyelitis, pulmonary edema, PNA, onychomycosis, paronychia, ACS, UTI  Independently Interpreted Test(s):   I have ordered and independently interpreted X-rays - see summary below.       <> Summary of X-Ray Reading(s): Foot xray: no acute process  Clinical Tests:   Lab Tests: Ordered and Reviewed  Radiological Study: Ordered and Reviewed  ED Management:  CBC, CMP, U/A, CXR, left foot x-rays, covid-19 swab, ESR, CRP, EKG and trops. Pts inflammatory markers elevated and pt also febrile to 100.3. Started on IV vanc and rocephin. Left foot xray showing possible bone breakdown concerning for osteomyelitis. Trop also mildly elevated so will trend. Will initiate admission for IV abx and further evaluation. Admitting to hospital medicine under observation, Dr. Kc  Other:   I have discussed this case with another health care provider.       <> Summary of the Discussion: IM            Attending Attestation:   Physician Attestation Statement for Resident:  As the supervising MD   Physician Attestation Statement: I have personally seen and examined this patient.   I agree with the above history. -: Toe infection   As the supervising MD I agree with the above PE.    As the supervising MD I agree with the above treatment, course, plan, and disposition.                         Clinical Impression:   Final diagnoses:  [R07.9] Chest pain  [M79.673] Foot pain  [M79.673] Foot pain - r/o osteo          ED Disposition Condition    Observation               Meera Hope MD  Resident  03/28/22 7734       Randolph Olivera III, MD  03/29/22 2976

## 2022-03-29 NOTE — NURSING
PT arrived from [facility].  States she is in her bedroom and today is her wedding day.  Pt. States that she can walk independently at home but required assistance to roll in bed.  PT. With history of dementia. Pt. Has delayed responses that are not always accurate. Admit information obtained from patient and verified in chart to the best of knowledge. Care turned over to DONTE Jessica.

## 2022-03-29 NOTE — ASSESSMENT & PLAN NOTE
Per nursing home MAR, appears to only be on sliding scale insulin.  Last hemoglobin A1c 5.9.  Renal diet ordered; she will not likely need supplemental insulin due to decreased renal clearance in ESRD.

## 2022-03-29 NOTE — PLAN OF CARE
[facility] - Telemetry Stepdown ([facility]-7)  Discharge Assessment    Primary Care Provider: Neeru Benites MD     Discharge Assessment (most recent)     BRIEF DISCHARGE ASSESSMENT - 03/29/22 1332        Discharge Planning    Assessment Type Discharge Planning Brief Assessment     Resource/Environmental Concerns none     Support Systems Children     Equipment Currently Used at Home walker, rolling     Current Living Arrangements residential facility     Patient/Family Anticipates Transition to long-term care facility     Patient/Family Anticipated Services at Transition none     DME Needed Upon Discharge  none     Discharge Plan A Return to nursing home     Discharge Plan B Return to Nursing Home                 Pt is a 76 y.o. resident of [facility] admitted with Cellulitis of toe L foot and has a PMH of ESRD and CHF. She is a total care at [facility].  [facility] Discharge Packet given to patient and/or family with understanding verbalized.   name and number and estimated discharge date written on white board in patient's room with request to call for any questions or concerns.  Will continue to follow for needs.  Dorian Fontana RN,BSN

## 2022-03-29 NOTE — ASSESSMENT & PLAN NOTE
Hemoglobin nearly at transfusion threshold, but patient not able to consent.  Will need to reach surrogate decision maker in the morning to consent for blood if transfusion is indicated.  Will repeat CBC in a.m. to reassess and make sure that the ED cbc was not diluted.

## 2022-03-29 NOTE — PLAN OF CARE
Return to NH referral sent to [facility] via [facility].      03/29/22 7380   Post-Acute Status   Post-Acute Authorization Placement   Post-Acute Placement Status Referrals Sent   Discharge Plan   Discharge Plan A Return to nursing home   Discharge Plan B Return to Nursing Home     Dorian Fontana, RN,BSN

## 2022-03-29 NOTE — PROGRESS NOTES
Pharmacokinetic Initial Assessment: IV Vancomycin    Assessment/Plan:    Initiate intravenous vancomycin with loading dose of 1000 mg once with subsequent doses when random concentrations are less than 20 mcg/mL.  Desired empiric serum trough concentration is 15 to 20 mcg/mL.  Draw vancomycin random level with AM labs on 03/30/2022.  Pharmacy will continue to follow and monitor vancomycin.      Please contact pharmacy at extension [phone] with any questions regarding this assessment.     Thank you for the consult,   Joby Henry       Patient brief summary:  Kavya Figueroa is a 76 y.o. female initiated on antimicrobial therapy with IV Vancomycin for treatment of suspected bone/joint infection.    Drug Allergies:   Review of patient's allergies indicates:  No Known Allergies    Actual Body Weight:   55.7 kg    Renal Function:   Estimated Creatinine Clearance: 7.7 mL/min (A) (based on SCr of 4.7 mg/dL (H)).    Dialysis Method (if applicable):  intermittent HD - home schedule MWF.    CBC (last 72 hours):  Recent Labs   Lab Result Units 03/28/22  2152   WBC K/uL 5.82   Hemoglobin g/dL 7.1*   Hematocrit % 23.2*   Platelets K/uL 181   Gran % % 75.9*   Lymph % % 12.5*   Mono % % 8.1   Eosinophil % % 2.9   Basophil % % 0.3   Differential Method  Automated       Metabolic Panel (last 72 hours):  Recent Labs   Lab Result Units 03/28/22  2152   Sodium mmol/L 140   Potassium mmol/L 4.3   Chloride mmol/L 98   CO2 mmol/L 31*   Glucose mg/dL 236*   BUN mg/dL 21   Creatinine mg/dL 4.7*   Albumin g/dL 2.6*   Total Bilirubin mg/dL 0.4   Alkaline Phosphatase U/L 79   AST U/L 7*   ALT U/L <5*       Drug levels (last 3 results):  No results for input(s): VANCOMYCINRA, VANCOMYCINPE, VANCOMYCINTR in the last 72 hours.    Microbiologic Results:  Microbiology Results (last 7 days)     ** No results found for the last 168 hours. **

## 2022-03-29 NOTE — PROVIDER PROGRESS NOTES - EMERGENCY DEPT.
Signout Note    I received signout from the previous provider.     Chief complaint:  Toe Pain (From [facility], c/o toe pain since Friday. Per EMS, dressing in place, small amount of blood noted to dressing. Pt does not ambulate at baseline. Per EMS, pt speaking to someone who is not there, unsure of apteints baseline mentation)      Pertinent history &exam:  Kavya Figueroa is a 76 y.o. female with pertinent PMH of dementia, ESRD on HD, diabetes who presented to emergency department with complaint of toe infection.  Patient near febrile here.    Signed out pending labs and x-ray for final disposition    Vitals:    03/29/22 0152   BP: 135/64   Pulse: 106   Resp: 20   Temp:        Imaging Studies:    X-Ray Foot Complete Left   Final Result      No acute radiographic abnormality.         Electronically signed by: Madhu Mandel   Date:    03/28/2022   Time:    23:19      X-Ray Chest AP Portable   Final Result      Minimal atelectasis or infiltrate at the right lung base.         Electronically signed by: Madhu Mandel   Date:    03/28/2022   Time:    22:23          Medications Given:  Medications   vancomycin - pharmacy to dose (has no administration in time range)   apixaban tablet 2.5 mg (has no administration in time range)   aspirin EC tablet 81 mg (has no administration in time range)   atorvastatin tablet 40 mg (has no administration in time range)   calcium acetate(phosphat bind) capsule 667 mg (has no administration in time range)   clopidogreL tablet 75 mg (has no administration in time range)   cyanocobalamin tablet 250 mcg (has no administration in time range)   famotidine tablet 20 mg (has no administration in time range)   metoprolol tartrate (LOPRESSOR) tablet 25 mg (has no administration in time range)   polyethylene glycol packet 17 g (has no administration in time range)   glucose chewable tablet 16 g (has no administration in time range)   glucose chewable tablet 24 g (has no  administration in time range)   glucagon (human recombinant) injection 1 mg (has no administration in time range)   sodium chloride 0.9% flush 10 mL (has no administration in time range)   acetaminophen tablet 650 mg (has no administration in time range)   morphine injection 2 mg (has no administration in time range)   ondansetron injection 4 mg (has no administration in time range)   insulin aspart U-100 pen 0-5 Units (has no administration in time range)   melatonin tablet 6 mg (has no administration in time range)   dextrose 10% bolus 125 mL (has no administration in time range)   dextrose 10% bolus 250 mL (has no administration in time range)   cefTRIAXone (ROCEPHIN) 2 g/50 mL D5W IVPB (0 g Intravenous Stopped 3/29/22 0030)   vancomycin in dextrose 5 % 1 gram/250 mL IVPB 1,000 mg (0 mg/kg × 55.7 kg Intravenous Stopped 3/29/22 0210)       Pending Items/ MDM:  76 y.o. female with above complaint.  Near febrile here.  Elevation in ESR and CRP, and clinically consistent with infection.  Patient received IV antibiotics and was placed in observation to Internal Medicine.    Diagnostic Impression:    1. Chest pain    2. Foot pain    3. Foot pain         ED Disposition Condition    Observation              Caridad Lance MD  Emergency Medicine

## 2022-03-29 NOTE — CONSULTS
[facility] - Tele[facility]down ([place] [id number])  Nephrology  Consult Note    Patient Name: Kavya Figueroa  MRN: [id number]  Admission Date: 3/28/2022  Hospital Length of Stay: 0 days  Attending Provider: Jeri Kc MD   Primary Care Physician: Neeru Benites MD  Principal Problem:Cellulitis of toe of left foot    Inpatient consult to Nephrology  Consult performed by: Reji Ruiz MD  Consult ordered by: Tj Moody MD  Reason for consult: ESRD        Subjective:     HPI: Kavya Figueroa is a 76-year-old woman with dementia sent to the ED from [facility] for infection of left great toe. She is unable to provide much useful history due to dementia, so history largely obtained from ED notes and nursing home records.  Her left great toe was noted to have been swollen with an area of inflammation extending from the toenail bed so she was sent to the ED for evaluation. Podiatry was consulted and pt underwent debridement of the L great toe and was noted to have exposed bone. Nephrology consulted for management of ESRD.       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    B12 deficiency 5/10/2021    Chronic diastolic congestive heart failure 12/18/2019    Dyslipidemia 3/31/2019    ESRD on hemodialysis 5/7/2021    Essential hypertension 3/31/2019    GERD (gastroesophageal reflux disease) 7/23/2021    History of myocardial infarction 7/23/2021    Secondary hyperparathyroidism of renal origin 3/31/2019    Type 2 diabetes mellitus, without long-term current use of insulin 3/31/2019       Past Surgical History:   Procedure Laterality Date    DECLOTTING OF VASCULAR GRAFT Left 5/26/2021    Procedure: DECLOT-GRAFT;  Surgeon: REYNALDO Watters II, MD;  Location: [facility] OR [address] [place] [address];  Service: Cardiovascular;  Laterality: Left;  10.5 min  59.97 mGy  9.4910 Gy.cm  20ml Dye    FISTULOTOMY      VENOPLASTY Left 5/26/2021    Procedure: ANGIOPLASTY,  VEIN;  Surgeon: REYNALDO Watters II, MD;  Location: [facility] OR [address] [place] [address];  Service: Cardiovascular;  Laterality: Left;  balloon angioplasty       Review of patient's allergies indicates:  No Known Allergies  Current Facility-Administered Medications   Medication Frequency    [START ON 3/30/2022] 0.9%  NaCl infusion PRN    acetaminophen tablet 650 mg Q8H PRN    apixaban tablet 2.5 mg BID    aspirin EC tablet 81 mg Daily    atorvastatin tablet 40 mg Daily    calcium acetate(phosphat bind) capsule 667 mg TID WM    clopidogreL tablet 75 mg Daily    cyanocobalamin tablet 250 mcg Daily    dextrose 10% bolus 125 mL PRN    dextrose 10% bolus 250 mL PRN    [START ON 3/30/2022] epoetin aston-epbx injection 3,460 Units Every Mon, Wed, Fri    famotidine tablet 20 mg Daily    glucagon (human recombinant) injection 1 mg PRN    glucose chewable tablet 16 g PRN    glucose chewable tablet 24 g PRN    [START ON 3/30/2022] heparin (porcine) injection 1,000 Units PRN    insulin aspart U-100 pen 0-5 Units QID (AC + HS) PRN    LIDOcaine HCL 10 mg/ml (1%) injection 10 mL Once    melatonin tablet 6 mg Nightly PRN    metoprolol tartrate (LOPRESSOR) tablet 25 mg Daily    morphine injection 2 mg Q4H PRN    ondansetron injection 4 mg Q8H PRN    piperacillin-tazobactam 4.5 g in sodium chloride 0.9% 100 mL IVPB (ready to mix system) Q12H    polyethylene glycol packet 17 g BID PRN    sodium chloride 0.9% bolus 250 mL PRN    sodium chloride 0.9% flush 10 mL Q12H PRN    vancomycin - pharmacy to dose pharmacy to manage frequency     Family History    None       Tobacco Use    Smoking status: Never Smoker    Smokeless tobacco: Never Used   Substance and Sexual Activity    Alcohol use: Never    Drug use: Never    Sexual activity: Not Currently     Review of Systems   Respiratory:  Negative for chest tightness and shortness of breath.    Cardiovascular:  Negative for chest pain and leg swelling.   Gastrointestinal:  Negative  for abdominal distention, abdominal pain, diarrhea, nausea and vomiting.   Genitourinary:  Negative for flank pain.   Objective:     Vital Signs (Most Recent):  Temp: 98.1 °F (36.7 °C) (03/29/22 1159)  Pulse: 85 (03/29/22 1159)  Resp: 17 (03/29/22 1159)  BP: 130/61 (03/29/22 1159)  SpO2: 100 % (03/29/22 1159)  O2 Device (Oxygen Therapy): room air (03/29/22 0519) Vital Signs (24h Range):  Temp:  [96 °F (35.6 °C)-100.3 °F (37.9 °C)] 98.1 °F (36.7 °C)  Pulse:  [] 85  Resp:  [17-20] 17  SpO2:  [96 %-100 %] 100 %  BP: (125-140)/(58-90) 130/61     Weight: 69.2 kg (152 lb 8.9 oz) (03/29/22 0519)  Body mass index is 24.62 kg/m².  Body surface area is 1.8 meters squared.    I/O last 3 completed shifts:  In: 300 [IV Piggyback:300]  Out: -     Physical Exam  HENT:      Head: Normocephalic and atraumatic.      Mouth/Throat:      Mouth: Mucous membranes are moist.   Cardiovascular:      Rate and Rhythm: Normal rate.      Comments: LUE AVF  Pulmonary:      Effort: Pulmonary effort is normal. No respiratory distress.   Abdominal:      Palpations: Abdomen is soft.   Musculoskeletal:      Right lower leg: No edema.      Left lower leg: No edema.   Skin:     General: Skin is warm and dry.   Neurological:      Mental Status: She is alert.       Significant Labs:  CBC:   Recent Labs   Lab 03/29/22  0531   WBC 5.36   RBC 2.42*   HGB 6.8*   HCT 22.1*      MCV 91   MCH 28.1   MCHC 30.8*     CMP:   Recent Labs   Lab 03/28/22 2152 03/29/22  0531   * 221*   CALCIUM 9.1 8.9   ALBUMIN 2.6*  --    PROT 6.3  --     140   K 4.3 4.2   CO2 31* 31*   CL 98 99   BUN 21 23   CREATININE 4.7* 5.0*   ALKPHOS 79  --    ALT <5*  --    AST 7*  --    BILITOT 0.4  --          Assessment/Plan:     ESRD on hemodialysis  Nephrology History  iHD Schedule: MWF   Unit/MD: Dr. Alejandro PINEDA  EDW: ?  Access: LUE AVF    Will schedule dialysis tomorrow for metabolic clearance and volume management.   Dialysate adjusted to current labs   Continue  to monitor intake and output, daily weights   Avoid nephrotoxic medication and renal dose medications to GFR  Strict I/Os    Anemia of Chronic Kidney Disease   Hgb goal in ESRD is Hgb of 10-11.   Check iron studies in am.   Epo with HD as per outpatient schedule   Transfuse for Hg <7     Lab Results   Component Value Date    IRON 44 05/09/2021    TIBC 182 (L) 05/09/2021    FESATURATED 24 05/09/2021    HGB 6.8 (L) 03/29/2022    MCV 91 03/29/2022       Mineral Bone Disease in CKD   Renal diet if not NPO   Continue home phos binder   Daily RFP  Vitamin D and PTH to be checked with am labs.     Lab Results   Component Value Date    CALCIUM 8.9 03/29/2022    PHOS 4.3 03/29/2022    [id number] 17 (L) 05/15/2021                Thank you for your consult. I will follow-up with patient. Please contact us if you have any additional questions.    Reji Ruiz MD  Nephrology  [facility] - Tele[facility] ([place]

## 2022-03-29 NOTE — SUBJECTIVE & OBJECTIVE
Scheduled Meds:   apixaban  2.5 mg Oral BID    aspirin  81 mg Oral Daily    atorvastatin  40 mg Oral Daily    calcium acetate(phosphat bind)  667 mg Oral TID WM    clopidogreL  75 mg Oral Daily    cyanocobalamin  250 mcg Oral Daily    [START ON 3/30/2022] epoetin aston-epbx  50 Units/kg Subcutaneous Every Mon, Wed, Fri    famotidine  20 mg Oral Daily    LIDOcaine HCL 10 mg/ml (1%)  10 mL Other Once    metoprolol tartrate  25 mg Oral Daily     Continuous Infusions:  PRN Meds:[START ON 3/30/2022] sodium chloride 0.9%, acetaminophen, dextrose 10%, dextrose 10%, glucagon (human recombinant), glucose, glucose, [START ON 3/30/2022] heparin (porcine), insulin aspart U-100, melatonin, morphine, ondansetron, polyethylene glycol, sodium chloride 0.9%, sodium chloride 0.9%, Pharmacy to dose Vancomycin consult **AND** vancomycin - pharmacy to dose    Review of patient's allergies indicates:  No Known Allergies     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    B12 deficiency 5/10/2021    Chronic diastolic congestive heart failure 12/18/2019    Dyslipidemia 3/31/2019    ESRD on hemodialysis 5/7/2021    Essential hypertension 3/31/2019    GERD (gastroesophageal reflux disease) 7/23/2021    History of myocardial infarction 7/23/2021    Secondary hyperparathyroidism of renal origin 3/31/2019    Type 2 diabetes mellitus, without long-term current use of insulin 3/31/2019     Past Surgical History:   Procedure Laterality Date    DECLOTTING OF VASCULAR GRAFT Left 5/26/2021    Procedure: DECLOT-GRAFT;  Surgeon: REYNALDO Watters II, MD;  Location: [facility] OR [address] [place] [address];  Service: Cardiovascular;  Laterality: Left;  10.5 min  59.97 mGy  9.4910 Gy.cm  20ml Dye    FISTULOTOMY      VENOPLASTY Left 5/26/2021    Procedure: ANGIOPLASTY, VEIN;  Surgeon: REYNALDO Watters II, MD;  Location: [facility] OR [address] [place] [address];  Service: Cardiovascular;  Laterality: Left;  balloon angioplasty       Family History    None       Tobacco Use     Smoking status: Never Smoker    Smokeless tobacco: Never Used   Substance and Sexual Activity    Alcohol use: Never    Drug use: Never    Sexual activity: Not Currently     Review of Systems   Unable to perform ROS: Dementia   Constitutional:  Negative for fever.   Musculoskeletal:  Positive for arthralgias, gait problem and myalgias.   Skin:  Positive for wound.   Objective:     Vital Signs (Most Recent):  Temp: 98.1 °F (36.7 °C) (03/29/22 1159)  Pulse: 85 (03/29/22 1159)  Resp: 17 (03/29/22 1159)  BP: 130/61 (03/29/22 1159)  SpO2: 100 % (03/29/22 1159) Vital Signs (24h Range):  Temp:  [96 °F (35.6 °C)-100.3 °F (37.9 °C)] 98.1 °F (36.7 °C)  Pulse:  [] 85  Resp:  [17-20] 17  SpO2:  [96 %-100 %] 100 %  BP: (125-140)/(58-90) 130/61     Weight: 69.2 kg (152 lb 8.9 oz)  Body mass index is 24.62 kg/m².    Foot Exam    Right Foot/Ankle     Inspection and Palpation  Tenderness: none   Swelling: none   Skin Exam: skin intact; no drainage, no cellulitis, no ulcer and no erythema     Neurovascular  Dorsalis pedis: 2+  Posterior tibial: 1+      Left Foot/Ankle      Inspection and Palpation  Tenderness: (L great toe)  Swelling: (localized edema to the left great toe)  Skin Exam: drainage, cellulitis, skin changes, ulcer and erythema; skin not intact     Neurovascular  Dorsalis pedis: 2+  Posterior tibial: 1+        Laboratory:  CBC:   Recent Labs   Lab 03/29/22  0531   WBC 5.36   RBC 2.42*   HGB 6.8*   HCT 22.1*      MCV 91   MCH 28.1   MCHC 30.8*     CRP:   Recent Labs   Lab 03/28/22 2152   .3*     ESR:   Recent Labs   Lab 03/28/22 2152   SEDRATE 76*     Wound Cultures: No results for input(s): LABAERO in the last 4320 hours.  Microbiology Results (last 7 days)       Procedure Component Value Units Date/Time    Culture, Anaerobe [[id number]] Collected: 03/29/22 1217    Order Status: Sent Specimen: Wound from Toe, Left Foot Updated: 03/29/22 1218    Aerobic culture [[id number]] Collected: 03/29/22 1217     Order Status: Sent Specimen: Wound from Toe, Left Foot Updated: 03/29/22 1218    Gram stain [[id number]] Collected: 03/29/22 1217    Order Status: Sent Specimen: Wound from Toe, Left Foot Updated: 03/29/22 1218          Specimen (24h ago, onward)                None            Diagnostic Results:  I have reviewed all pertinent imaging results/findings within the past 24 hours.  Imaging Results              X-Ray Foot Complete Left (Final result)  Result time 03/28/22 23:19:18      Final result by Madhu Sandhu MD (03/28/22 23:19:18)                   Impression:      No acute radiographic abnormality.      Electronically signed by: Madhu Sandhu  Date:    03/28/2022  Time:    23:19               Narrative:    EXAMINATION:  XR FOOT COMPLETE 3 VIEW LEFT    CLINICAL HISTORY:  .  Pain in unspecified foot    TECHNIQUE:  AP, lateral and oblique views of the left foot were performed.    COMPARISON:  None    FINDINGS:  Vascular atherosclerosis.    No acute fracture, subluxation or dislocation.  Calcaneal spurring inferiorly and posteriorly.    Mild degenerative changes of the 1st metatarsophalangeal joint and interphalangeal joints.  Mild osteophytic spurring of the dorsum of the foot also.                                       X-Ray Chest AP Portable (Final result)  Result time 03/28/22 22:23:46      Final result by Madhu Sandhu MD (03/28/22 22:23:46)                   Impression:      Minimal atelectasis or infiltrate at the right lung base.      Electronically signed by: Madhu Sandhu  Date:    03/28/2022  Time:    22:23               Narrative:    EXAMINATION:  XR CHEST AP PORTABLE    CLINICAL HISTORY:  Chest Pain;    TECHNIQUE:  Single frontal view of the chest was performed.    COMPARISON:  09/14/2021, 05/07/2021    FINDINGS:  Minimal atelectasis or infiltrate at the right lung base.    The lungs are otherwise clear, with normal appearance of pulmonary vasculature and no pleural effusion or pneumothorax.    The  cardiac silhouette is normal in size. Stable mild prominence of the right hilar vasculature, similar to prior studies.    Bones are intact.                                        Clinical Findings:  Nails 1-5 bilateral foot are significantly elongated 6-7 mm.  Left hallux nail is dystrophic with significant loosening and underlying malodorous purulence. Localized erythema and edema to the left hallux. Nails 2-5 bilateral are also yellow discoloration mild loosening underlying debris.    Upon removal of the left hallux nail, nail bed was necrotic with open wound that probes to distal phalanx bone. +Malodor and purulent drainage.

## 2022-03-29 NOTE — HPI
Kavya Figueroa is a 76-year-old woman with dementia sent to the ED from [facility] for infection of left great toe. She is unable to provide much useful history due to dementia, so history largely obtained from ED notes and nursing home records.  Her left great toe was noted to have been swollen with an area of inflammation extending from the toenail bed so she was sent to the ED for evaluation. Podiatry was consulted and pt underwent debridement of the L great toe and was noted to have exposed bone. Nephrology consulted for management of ESRD.

## 2022-03-29 NOTE — HPI
68-year-old woman with dementia sent to the ED from [facility] for infection of left great toe.  She is unable to provide much useful history due to dementia, so history largely obtained from ED notes and nursing home records.  Her left great toe was noted to have been swollen with an area of inflammation extending from the toenail bed so she was sent to the ED for evaluation.

## 2022-03-29 NOTE — SUBJECTIVE & OBJECTIVE
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    B12 deficiency 5/10/2021    Chronic diastolic congestive heart failure 12/18/2019    Dyslipidemia 3/31/2019    ESRD on hemodialysis 5/7/2021    Essential hypertension 3/31/2019    GERD (gastroesophageal reflux disease) 7/23/2021    History of myocardial infarction 7/23/2021    Secondary hyperparathyroidism of renal origin 3/31/2019    Type 2 diabetes mellitus, without long-term current use of insulin 3/31/2019       Past Surgical History:   Procedure Laterality Date    DECLOTTING OF VASCULAR GRAFT Left 5/26/2021    Procedure: DECLOT-GRAFT;  Surgeon: REYNALDO Watters II, MD;  Location: [facility] OR [address] [place] [address];  Service: Cardiovascular;  Laterality: Left;  10.5 min  59.97 mGy  9.4910 Gy.cm  20ml Dye    FISTULOTOMY      VENOPLASTY Left 5/26/2021    Procedure: ANGIOPLASTY, VEIN;  Surgeon: REYNALDO Watters II, MD;  Location: [facility] OR [address] [place] [address];  Service: Cardiovascular;  Laterality: Left;  balloon angioplasty       Review of patient's allergies indicates:  No Known Allergies    No current facility-administered medications on file prior to encounter.     Current Outpatient Medications on File Prior to Encounter   Medication Sig    acetaminophen (TYLENOL) 325 MG tablet Take 2 tablets (650 mg total) by mouth every 6 (six) hours as needed (for pain scale 1-4 or fever over 101).    apixaban (ELIQUIS) 2.5 mg Tab Take 1 tablet (2.5 mg total) by mouth 2 (two) times daily.    aspirin (ECOTRIN) 81 MG EC tablet Take 1 tablet (81 mg total) by mouth once daily.    atorvastatin (LIPITOR) 40 MG tablet Take 1 tablet (40 mg total) by mouth once daily.    calcium acetate,phosphat bind, (PHOSLO) 667 mg tablet Take 1 tablet (667 mg total) by mouth 3 (three) times daily with meals.    clopidogreL (PLAVIX) 75 mg tablet Take 1 tablet (75 mg total) by mouth once daily.    cyanocobalamin (VITAMIN B-12) 250 MCG tablet Take 1 tablet (250 mcg total) by mouth once daily.    famotidine  (PEPCID) 20 MG tablet Take 1 tablet (20 mg total) by mouth once daily.    metoprolol tartrate (LOPRESSOR) 25 MG tablet Take 1 tablet (25 mg total) by mouth once daily.    polyethylene glycol (GLYCOLAX) 17 gram PwPk Take 17 g by mouth 2 (two) times daily as needed (Constipation).    [DISCONTINUED] QUEtiapine (SEROQUEL) 25 MG Tab Take 1 tablet (25 mg total) by mouth nightly as needed (agitation).     Family History    None       Tobacco Use    Smoking status: Never Smoker    Smokeless tobacco: Never Used   Substance and Sexual Activity    Alcohol use: Never    Drug use: Never    Sexual activity: Not Currently     Review of Systems   Unable to perform ROS: Dementia   Objective:     Vital Signs (Most Recent):  Temp: 98.6 °F (37 °C) (03/29/22 0519)  Pulse: 100 (03/29/22 0519)  Resp: 18 (03/29/22 0519)  BP: (!) 139/90 (03/29/22 0519)  SpO2: 98 % (03/29/22 0519)   Vital Signs (24h Range):  Temp:  [98.6 °F (37 °C)-100.3 °F (37.9 °C)] 98.6 °F (37 °C)  Pulse:  [] 100  Resp:  [18-20] 18  SpO2:  [96 %-100 %] 98 %  BP: (125-139)/(58-90) 139/90     Weight: 69.2 kg (152 lb 8.9 oz)  Body mass index is 24.62 kg/m².    Physical Exam  Vitals and nursing note reviewed.   Constitutional:       General: She is sleeping. She is not in acute distress.     Appearance: She is normal weight. She is not toxic-appearing.   HENT:      Head: Normocephalic and atraumatic.      Nose: Nose normal.      Mouth/Throat:      Mouth: Mucous membranes are moist.      Pharynx: Oropharynx is clear.   Eyes:      General: No scleral icterus.     Extraocular Movements: Extraocular movements intact.      Conjunctiva/sclera: Conjunctivae normal.      Pupils: Pupils are equal, round, and reactive to light.   Cardiovascular:      Rate and Rhythm: Tachycardia present.      Heart sounds: Murmur (thrill from fistula ausculable up to heart) heard.     No gallop.   Pulmonary:      Effort: Pulmonary effort is normal.      Breath sounds: Normal breath sounds.    Abdominal:      General: Bowel sounds are normal. There is no distension.      Palpations: Abdomen is soft.      Tenderness: There is no abdominal tenderness. There is no guarding.   Musculoskeletal:         General: Swelling (left great toe) present. No tenderness or signs of injury. Normal range of motion.      Cervical back: Normal range of motion and neck supple. No rigidity.   Feet:      Comments: All toenails are severely ingrown and dysmorphic and have not been trimmed in a very long time.  There is a small wound at the lateral nail bed of the left great toe with purulence.  The left great toe is notably swollen and erythematous.  Lymphadenopathy:      Cervical: No cervical adenopathy.   Skin:     General: Skin is warm and dry.      Findings: Erythema (left great toe) present.      Comments: Very dry, flaky skin on feet   Neurological:      Mental Status: She is easily aroused. She is disoriented.      Cranial Nerves: No cranial nerve deficit.         CRANIAL NERVES     CN III, IV, VI   Pupils are equal, round, and reactive to light.     Significant Labs: All pertinent labs within the past 24 hours have been reviewed.  A1C: No results for input(s): HGBA1C in the last 4320 hours.  CBC:   Recent Labs   Lab 03/28/22  2152   WBC 5.82   HGB 7.1*   HCT 23.2*        CMP:   Recent Labs   Lab 03/28/22  2152      K 4.3   CL 98   CO2 31*   *   BUN 21   CREATININE 4.7*   CALCIUM 9.1   PROT 6.3   ALBUMIN 2.6*   BILITOT 0.4   ALKPHOS 79   AST 7*   ALT <5*   ANIONGAP 11   EGFRNONAA 8.3*       Significant Imaging: I have reviewed all pertinent imaging results/findings within the past 24 hours.

## 2022-03-29 NOTE — CARE UPDATE
Patient seen at bedside, pleasantly demented. She does her name and . She says theres mild pain in her foot, but she feels better than yesterday. Her hgb is still low but stable. Will check with nursing on any signs of active bleeding but per chart review it appears her baseline wanders, as low as 6 and as high as 9 in the past year. T/S in AM, will have to call family if consent is needed    Podiatry to evaluate patient today, appreciate siomara Billy PA-C  [facility]

## 2022-03-29 NOTE — ED NOTES
rec'd pt to room 22 awake, alert, oriented to self, confused. Continuous pulse ox et automatic BP cuff applied, VSS. NADN. Bed low and locked with siderails up x2. Call light within reach, will continue to monitor.

## 2022-03-29 NOTE — PLAN OF CARE
Pt arrived to [facility] floor confused, disoriented to time. VSS, no SOB, no chest pain, no N/V, no fever. Pt complaint of all over body pain and specific to L big toe. PRN pain med administered.  L big toe appears infected, red, inflamed with pus, part of nail missing, open to air.  mg/dl. Sacral pressure ulcer Stage 2, pt changed, placed in diaper w/ purwick. Will continue to monitor.     Problem: Adult Inpatient Plan of Care  Goal: Plan of Care Review  Outcome: Ongoing, Progressing  Goal: Patient-Specific Goal (Individualized)  Outcome: Ongoing, Progressing  Goal: Absence of Hospital-Acquired Illness or Injury  Outcome: Ongoing, Progressing  Goal: Optimal Comfort and Wellbeing  Outcome: Ongoing, Progressing  Goal: Readiness for Transition of Care  Outcome: Ongoing, Progressing     Problem: Diabetes Comorbidity  Goal: Blood Glucose Level Within Targeted Range  Outcome: Ongoing, Progressing     Problem: Impaired Wound Healing  Goal: Optimal Wound Healing  Outcome: Ongoing, Progressing     Problem: Skin Injury Risk Increased  Goal: Skin Health and Integrity  Outcome: Ongoing, Progressing

## 2022-03-29 NOTE — HPI
76 y.o female with PMHx of ESRD on HD MWF, HTN, Type 2 DM, dementia, who was sent to [facility] ED 3/28/2022 from [facility] due to concern for L great toe infection. HPI limited due to patient's dementia. Patient states her left great toe feels sore.    On initial presentation, patient afebrile, vital signs stable. WBC 5.82, inflammatory markers elevated with .3 and ESR 76. Xray left foot with vascular atherosclerosis. No acute fracture, subluxation or dislocation.  No foreign body, soft tissue gas, or osteomyelitis appreciated.

## 2022-03-29 NOTE — ED NOTES
Bed: [facility]  Expected date:   Expected time:   Means of arrival:   Comments:  isiah

## 2022-03-29 NOTE — ED NOTES
Patient identifiers verified and correct for   LOC: The patient is awake, alert and aware of environment with an appropriate affect, the patient is oriented x 2 and speaking appropriately.   APPEARANCE: Patient appears comfortable and in no acute distress, patient is clean and well groomed.  SKIN: The skin is warm and dry, color consistent with ethnicity, patient has normal skin turgor and moist mucus membranes, skin not intact on the left greater toe, no breakdown or bruising noted.   MUSCULOSKELETAL: Patient not able to move all extremities spontaneously, no swelling noted.  RESPIRATORY: Airway is open and patent, respirations are spontaneous, patient has a normal effort and rate, no accessory muscle use noted, pt placed on continuous pulse ox with O2 sats noted at 95% on room air.  CARDIAC: S1, S2 audible  GASTRO: Soft and non tender to palpation, no distention noted, normoactive bowel sounds present in all four quadrants. Pt states bowel movements have been regular.  : Pt denies any pain or frequency with urination. Pt is in incontinent of urine.  NEURO: Pt opens eyes spontaneously, behavior appropriate to situation, follows commands, facial expression symmetrical, bilateral hand grasp equal and even, purposeful motor response noted, normal sensation in all extremities when touched with a finger.  Kavya Figueroa, a 76 y.o. female presents to the ED w/ complaint of small area of breakdown to the left greater toe.    Triage note:  Chief Complaint   Patient presents with    Toe Pain     From [facility], c/o toe pain since Friday. Per EMS, dressing in place, small amount of blood noted to dressing. Pt does not ambulate at baseline. Per EMS, pt speaking to someone who is not there, unsure of apteints baseline mentation     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    B12 deficiency 5/10/2021    Chronic  diastolic congestive heart failure 12/18/2019    Dyslipidemia 3/31/2019    ESRD on hemodialysis 5/7/2021    Essential hypertension 3/31/2019    GERD (gastroesophageal reflux disease) 7/23/2021    History of myocardial infarction 7/23/2021    Secondary hyperparathyroidism of renal origin 3/31/2019    Type 2 diabetes mellitus, without long-term current use of insulin 3/31/2019

## 2022-03-29 NOTE — ASSESSMENT & PLAN NOTE
Podiatry consulted for further evaluation.  She will likely need extensive trimming of her toenails, which are the likely cause of the wound resulting in the infection of her toe.  Vancomycin started in the ED and will be dosed per pharmacy.  X-ray of the foot not suggestive of osteomyelitis; will defer to Podiatry regarding whether MRI should be pursued.  Inflammatory markers significantly elevated; however they seem to be chronically so.

## 2022-03-30 PROBLEM — M86.072 ACUTE HEMATOGENOUS OSTEOMYELITIS OF LEFT FOOT: Status: ACTIVE | Noted: 2022-01-01

## 2022-03-30 NOTE — HOSPITAL COURSE
Patient admitted for osteomyelitis of her L great toe as confirmed on MRI. Started on IV Vanc and Zosyn. Podiatry consulted, cleaned at bedside, cultures growing staph. Podiatry considering bone biopsy vs amputation. Patient and family wish to proceed with amputation. Left big toe amputated successfully with clean margins by podiatry on 3/31/22. ID consulted and recommend to d/c vanc/zosyn and initiate cefazolin 1g daily for 24-48 hours post-amputation. Continue local wound care by podiatry. Stable for dc back to NH with updated wound care orders. FU with podiatry outpt.

## 2022-03-30 NOTE — ASSESSMENT & PLAN NOTE
Podiatry consulted for further evaluation.   -X-ray of the foot not suggestive of osteomyelitis  - Podiatry consulted and MRI ordered which does show evidence of osteomyelitis  - cultures taken at bedside, growing staph  - continuing IV Vanc and Zosyn  - ID consulted

## 2022-03-30 NOTE — ASSESSMENT & PLAN NOTE
Hemoglobin nearly at transfusion threshold, but patient not able to consent.  Will need to reach surrogate decision maker in the morning to consent for blood if transfusion is indicated. \  Her BL has wandered from 6-9  Suspect acute infection contributing to anemia  Will T/S in AM

## 2022-03-30 NOTE — PROGRESS NOTES
[facility] HEMODIALYSIS NOTE     Patient currently on hemodialysis for removal of uremic toxins and volume.     Patient seen and evaluated on hemodialysis, tolerating treatment, see HD flowsheet for vitals and assessments.    Labs have been reviewed and the dialysate bath has been adjusted.       Assessment/Plan:    -Patient seen on HD, tolerating treatment well, w/o complaints   -UF goal of 1-2L as tolerated  -Renal diet, if not NPO   -Strict I/O's and daily weights  -Daily renal function panels  -Keep MAP >65 while on HD   -Maintain Hgb >7.0  -Continue EPO   -Will continue to follow while inpatient     Arianna Garcias DNP-FNP, C  Nephrology  Pager: [phone]

## 2022-03-30 NOTE — ANESTHESIA PREPROCEDURE EVALUATION
[facility]  Anesthesia Pre-Operative Evaluation         Patient Name: Kavya Figueroa  YOB: 1945  MRN: [id number]    SUBJECTIVE:     Pre-operative evaluation for Procedure(s) (LRB):  AMPUTATION, TOE LEFT (Left)     03/30/2022    Pt has dementia and only family member in the chart is daughter who was called x2 and she did not answered     Kavya Figueroa is a 76 y.o. female w/ a significant PMHx of HFpEF, HTN, poorly controlled IDDM, ESRD on HD, previous MI, and  dementia sent to the ED from [facility] for infection of left great toe.     Pt now presents for the above procedure.      LDA:   Peripheral Intravenous Line  Duration       Peripheral IV - Single Lumen 03/29/22 1545 22 G Anterior;Right Forearm 1 day       Prev airway: None documented.     Drips: None documented.    Patient Active Problem List   Diagnosis    Chronic diastolic congestive heart failure    Type 2 diabetes mellitus with diabetic nephropathy, with long-term current use of insulin    Dyslipidemia    Essential hypertension    Secondary hyperparathyroidism of renal origin    Anemia due to end stage renal disease    ESRD on hemodialysis    B12 deficiency    History of myocardial infarction    GERD (gastroesophageal reflux disease)    Chronic kidney disease-mineral and bone disorder    Acute hematogenous osteomyelitis of left foot       Review of patient's allergies indicates:  No Known Allergies    Current Inpatient Medications:   apixaban  2.5 mg Oral BID    aspirin  81 mg Oral Daily    atorvastatin  40 mg Oral Daily    calcium acetate(phosphat bind)  667 mg Oral TID WM    clopidogreL  75 mg Oral Daily    cyanocobalamin  250 mcg Oral Daily    epoetin aston-epbx  50 Units/kg Intravenous Every Mon, Wed, Fri    famotidine  20 mg Oral Daily    LIDOcaine HCL 10 mg/ml (1%)  10 mL Other Once    metoprolol tartrate  25 mg Oral Daily       No current facility-administered  medications on file prior to encounter.     Current Outpatient Medications on File Prior to Encounter   Medication Sig Dispense Refill    acetaminophen (TYLENOL) 325 MG tablet Take 2 tablets (650 mg total) by mouth every 6 (six) hours as needed (for pain scale 1-4 or fever over 101).  0    apixaban (ELIQUIS) 2.5 mg Tab Take 1 tablet (2.5 mg total) by mouth 2 (two) times daily.      aspirin (ECOTRIN) 81 MG EC tablet Take 1 tablet (81 mg total) by mouth once daily. 30 tablet 3    atorvastatin (LIPITOR) 40 MG tablet Take 1 tablet (40 mg total) by mouth once daily. 30 tablet 3    calcium acetate,phosphat bind, (PHOSLO) 667 mg tablet Take 1 tablet (667 mg total) by mouth 3 (three) times daily with meals. 90 tablet 3    clopidogreL (PLAVIX) 75 mg tablet Take 1 tablet (75 mg total) by mouth once daily. 90 tablet 3    cyanocobalamin (VITAMIN B-12) 250 MCG tablet Take 1 tablet (250 mcg total) by mouth once daily. 90 tablet 3    famotidine (PEPCID) 20 MG tablet Take 1 tablet (20 mg total) by mouth once daily.      metoprolol tartrate (LOPRESSOR) 25 MG tablet Take 1 tablet (25 mg total) by mouth once daily. 90 tablet 3    polyethylene glycol (GLYCOLAX) 17 gram PwPk Take 17 g by mouth 2 (two) times daily as needed (Constipation).  0       Past Surgical History:   Procedure Laterality Date    DECLOTTING OF VASCULAR GRAFT Left 5/26/2021    Procedure: DECLOT-GRAFT;  Surgeon: REYNALDO Watters II, MD;  Location: [address];  Service: Cardiovascular;  Laterality: Left;  10.5 min  59.97 mGy  9.4910 Gy.cm  20ml Dye    FISTULOTOMY      VENOPLASTY Left 5/26/2021    Procedure: ANGIOPLASTY, VEIN;  Surgeon: REYNALDO Watters II, MD;  Location: [address];  Service: Cardiovascular;  Laterality: Left;  balloon angioplasty       Social History     Socioeconomic History    Marital status: Single   Tobacco Use    Smoking status: Never Smoker    Smokeless tobacco: Never Used   Substance and Sexual Activity    Alcohol  use: Never    Drug use: Never    Sexual activity: Not Currently       OBJECTIVE:     Vital Signs Range (Last 24H):  Temp:  [36.1 °C (96.9 °F)-36.8 °C (98.2 °F)]   Pulse:  [71-84]   Resp:  [16-18]   BP: ()/(35-65)   SpO2:  [92 %-97 %]       CBC:   Recent Labs     03/29/22  0531 03/30/22  0536   WBC 5.36 5.01   RBC 2.42* 2.35*   HGB 6.8* 6.6*   HCT 22.1* 21.4*    155   MCV 91 91   MCH 28.1 28.1   MCHC 30.8* 30.8*       CMP:   Recent Labs     03/28/22 2152 03/29/22  0531 03/30/22  0536    140 140   K 4.3 4.2 4.5   CL 98 99 99   CO2 31* 31* 28   BUN 21 23 34*   CREATININE 4.7* 5.0* 6.8*   * 221* 194*   MG  --  2.0 2.0   PHOS  --  4.3 5.5*   CALCIUM 9.1 8.9 9.6   ALBUMIN 2.6*  --   --    PROT 6.3  --   --    ALKPHOS 79  --   --    ALT <5*  --   --    AST 7*  --   --    BILITOT 0.4  --   --        INR:  No results for input(s): PT, INR, PROTIME, APTT in the last 72 hours.    Diagnostic Studies: No relevant studies.    EKG:   Results for orders placed or performed during the hospital encounter of 03/28/22   EKG 12-lead    Collection Time: 03/28/22  9:39 PM    Narrative    Test Reason : R07.9,    Vent. Rate : 085 BPM     Atrial Rate : 085 BPM     P-R Int : 114 ms          QRS Dur : 096 ms      QT Int : 378 ms       P-R-T Axes : 086 031 068 degrees     QTc Int : 449 ms    Normal sinus rhythm with sinus arrhythmia  Normal ECG  When compared with ECG of 14-SEP-2021 13:09,  No significant change was found  Confirmed by Jos SINGH MD ([id number]) on 3/29/2022 8:00:44 AM    Referred By: [email or web address]   SELF           Confirmed By:Jos SINGH MD        2D ECHO:   No results found for this or any previous visit.         ASSESSMENT/PLAN:       Pre-op Assessment    I have reviewed the Patient Summary Reports.     I have reviewed the Nursing Notes. I have reviewed the NPO Status.   I have reviewed the Medications.     Review of Systems  Anesthesia Hx:  No problems with previous Anesthesia Denies Hx of Anesthetic  complications  History of prior surgery of interest to airway management or planning: Denies Family Hx of Anesthesia complications.   Denies Personal Hx of Anesthesia complications.   Social:  Non-Smoker    Hematology/Oncology:     Oncology Normal    -- Anemia:   Cardiovascular:   Hypertension Past MI   Denies Angina. CHF hyperlipidemia ECG has been reviewed.    Pulmonary:   Denies Shortness of breath.  Denies Recent URI.    Renal/:   Chronic Renal Disease, ESRD, Dialysis    Hepatic/GI:   GERD Denies Liver Disease.    Neurological:   Denies CVA. Denies Seizures.    Endocrine:   Diabetes, poorly controlled, type 2, using insulin Hyperparathyroid        Physical Exam  General: Well nourished, Cooperative and Alert    Airway:  Mallampati: II   Mouth Opening: Normal  TM Distance: Normal  Tongue: Normal  Neck ROM: Normal ROM    Dental:  Intact  Blue tongue, confirmed NPO with floor nurse.      Anesthesia Plan  Type of Anesthesia, risks & benefits discussed:    Anesthesia Type: MAC, Regional  Intra-op Monitoring Plan: Standard ASA Monitors  Post Op Pain Control Plan: multimodal analgesia  Induction:  IV  Airway Plan: Direct, Post-Induction  Informed Consent: Informed consent signed with the Patient representative and all parties understand the risks and agree with anesthesia plan.  All questions answered.   ASA Score: 3  Day of Surgery Review of History & Physical: H&P Update referred to the surgeon/provider.  Anesthesia Plan Notes: Blue tongue on exam, confirmed NPO status with floor nurse.    Ready For Surgery From Anesthesia Perspective.     .

## 2022-03-30 NOTE — PROGRESS NOTES
Report received from DONTE Chao. Pt arrived from floor awake, alert and oriented to self via stretcher. Maintenance dialysis started via RYAN fistula with 15g needles BFR@400.

## 2022-03-30 NOTE — PROGRESS NOTES
[facility] [facility]  [facility] Note    Patient Name: Kavya Figueroa  MRN: [id number]  Patient Class: IP- Inpatient   Admission Date: 3/28/2022  Length of Stay: 0 days  Attending Physician: Jeri Kc MD  Primary Care Provider: Neeru Benites MD        Subjective:     Principal Problem:Acute hematogenous osteomyelitis of left foot        HPI:  68-year-old woman with dementia sent to the ED from [facility] for infection of left great toe.  She is unable to provide much useful history due to dementia, so history largely obtained from ED notes and nursing home records.  Her left great toe was noted to have been swollen with an area of inflammation extending from the toenail bed so she was sent to the ED for evaluation.      Overview/Hospital Course:  Patient admitted for osteomyelitis of her L great toe as confirmed on MRI. Started on IV Vanc and Zosyn. Podiatry consulted, cleaned at bedside, cultures growing staph. Podiatry considering bone biopsy vs amputation. ID consulted.       Interval History: MRI confirms osteomyelitis. Cx growing staph. Formal ID consult placed. Pt seen at bedside, has no complaints. Pleasant, appropriately conversational  Spoke with her daughter Ms. Harrington to inform her of the osteo diagnosis and podiatry evaluation    Review of Systems   Unable to perform ROS: Dementia   Constitutional:  Negative for fever.   Musculoskeletal:  Positive for gait problem.   Skin:  Positive for wound.   Objective:     Vital Signs (Most Recent):  Temp: 97.5 °F (36.4 °C) (03/30/22 1242)  Pulse: 84 (03/30/22 1242)  Resp: 18 (03/30/22 1242)  BP: (!) 129/57 (03/30/22 1242)  SpO2: 96 % (03/30/22 1242)   Vital Signs (24h Range):  Temp:  [96.9 °F (36.1 °C)-98.2 °F (36.8 °C)] 97.5 °F (36.4 °C)  Pulse:  [71-84] 84  Resp:  [16-18] 18  SpO2:  [92 %-97 %] 96 %  BP: ()/(35-65) 129/57     Weight: 69.2 kg (152 lb 8.9 oz)  Body mass index is 24.62  kg/m².    Intake/Output Summary (Last 24 hours) at 3/30/2022 1322  Last data filed at 3/30/2022 1128  Gross per 24 hour   Intake 300 ml   Output 1641 ml   Net -1341 ml      Physical Exam  Vitals and nursing note reviewed.   Constitutional:       General: She is sleeping. She is not in acute distress.     Appearance: She is normal weight.   HENT:      Head: Normocephalic and atraumatic.   Cardiovascular:      Rate and Rhythm: Tachycardia present.      Heart sounds: Murmur heard.   Pulmonary:      Effort: Pulmonary effort is normal.      Breath sounds: Normal breath sounds.   Abdominal:      General: Abdomen is flat. There is no distension.      Palpations: Abdomen is soft.      Tenderness: There is no abdominal tenderness.   Musculoskeletal:         General: Swelling (left great toe) present. No tenderness or signs of injury. Normal range of motion.      Cervical back: Normal range of motion and neck supple. No rigidity.   Feet:      Comments: All toenails are severely ingrown and dysmorphic and have not been trimmed in a very long time.  There is a small wound at the lateral nail bed of the left great toe with purulence.  The left great toe is notably swollen and erythematous.  Lymphadenopathy:      Cervical: No cervical adenopathy.   Skin:     General: Skin is warm and dry.      Findings: Erythema (left great toe) present.      Comments: Very dry, flaky skin on feet   Neurological:      Mental Status: She is easily aroused. She is disoriented.      Cranial Nerves: No cranial nerve deficit.      Comments: Knows her name and , knows shes at [facility]       Significant Labs: All pertinent labs within the past 24 hours have been reviewed.    Significant Imaging: I have reviewed all pertinent imaging results/findings within the past 24 hours.      Assessment/Plan:      * Acute hematogenous osteomyelitis of left foot  Podiatry consulted for further evaluation.   -X-ray of the foot not suggestive of osteomyelitis  -  Podiatry consulted and MRI ordered which does show evidence of osteomyelitis  - cultures taken at bedside, growing staph  - continuing IV Vanc and Zosyn  - ID consulted      ESRD on hemodialysis  Nephrology consulted for dialysis; unclear if she got dialysis prior to being sent to the ED.      Anemia due to end stage renal disease  Hemoglobin nearly at transfusion threshold, but patient not able to consent.  Will need to reach surrogate decision maker in the morning to consent for blood if transfusion is indicated. \  Her BL has wandered from 6-9  Suspect acute infection contributing to anemia  Will T/S in AM      Type 2 diabetes mellitus with diabetic nephropathy, with long-term current use of insulin  Per nursing home MAR, appears to only be on sliding scale insulin.  Last hemoglobin A1c 5.9.  Renal diet ordered; she will not likely need supplemental insulin due to decreased renal clearance in ESRD.        VTE Risk Mitigation (From admission, onward)         Ordered     heparin (porcine) injection 1,000 Units  As needed (PRN)         03/29/22 0903     apixaban tablet 2.5 mg  2 times daily         03/29/22 0311     Reason for No Pharmacological VTE Prophylaxis  Once        Question:  Reasons:  Answer:  Already adequately anticoagulated on oral Anticoagulants    03/29/22 0311     IP VTE HIGH RISK PATIENT  Once         03/29/22 0311     Place sequential compression device  Until discontinued         03/29/22 0311                Discharge Planning   NGA: 4/1/2022     Code Status: Full Code   Is the patient medically ready for discharge?: No    Reason for patient still in hospital (select all that apply): Patient trending condition, Laboratory test, Treatment and Consult recommendations  Discharge Plan A: Return to nursing home                  Shavonne Billy PA-C  Department of Hospital Medicine   [facility] ([place]

## 2022-03-30 NOTE — PLAN OF CARE
Podiatry service  03/30/2022      FELECIA. Remains AFVSS. MRI left forefoot demonstrating osteomyelitis of the distal phalanx of the left hallux.     Patient does not have medical decision making capacity secondary to her dementia. Discussed treatment options for osteomyelitis with her daughter Stephan Harrington via telephone. Discussed bone biopsy with 6 weeks of IV antibiotics and local wound care vs amputation of the L great toe with 2 weeks of PO antibiotics. Daughter would like to proceed with amputation. Surgical consents obtained via telephone with 2 witnesses and consents placed in patient's chart.     Patient scheduled for L great toe amputation 3/31/2022    NPO past midnight    COVID tests up to date.     Podiatry will continue to follow    Kary Arteaga DPM  [facility]  Podiatry PGY3  Pager: [phone]  Spectra:[phone]

## 2022-03-30 NOTE — CONSULTS
[facility] ([place] [address])  Infectious Disease  Consult Note    Patient Name: Kavya Figueroa  MRN: [id number]  Admission Date: 3/28/2022  Hospital Length of Stay: 0 days  Attending Physician: Jeri Kc MD  Primary Care Provider: Neeru Benites MD     Isolation Status: No active isolations    Inpatient consult to Infectious Diseases  Consult performed by: Yeny Hutchinson PA-C  Consult ordered by: Shavonne Billy PA-C          ID consulted for osteomyelitis of left hallux with wound cultures +MRSA. Podiatry following, scheduled for left hallux amputation tomorrow. D/c zosyn at this time and continue vancomycin. Will follow with you tomorrow.     Thank you,  Yeny Hutchinson PA-C    Thank you for your consult. I will follow-up with patient. Please contact us if you have any additional questions.    Yeny Hutchinson PA-C  Infectious Disease  [facility] ([place])

## 2022-03-30 NOTE — SUBJECTIVE & OBJECTIVE
Interval History: MRI confirms osteomyelitis. Cx growing staph. Formal ID consult placed. Pt seen at bedside, has no complaints. Pleasant, appropriately conversational  Spoke with her daughter Ms. Harrington to inform her of the osteo diagnosis and podiatry evaluation    Review of Systems   Unable to perform ROS: Dementia   Constitutional:  Negative for fever.   Musculoskeletal:  Positive for gait problem.   Skin:  Positive for wound.   Objective:     Vital Signs (Most Recent):  Temp: 97.5 °F (36.4 °C) (03/30/22 1242)  Pulse: 84 (03/30/22 1242)  Resp: 18 (03/30/22 1242)  BP: (!) 129/57 (03/30/22 1242)  SpO2: 96 % (03/30/22 1242)   Vital Signs (24h Range):  Temp:  [96.9 °F (36.1 °C)-98.2 °F (36.8 °C)] 97.5 °F (36.4 °C)  Pulse:  [71-84] 84  Resp:  [16-18] 18  SpO2:  [92 %-97 %] 96 %  BP: ()/(35-65) 129/57     Weight: 69.2 kg (152 lb 8.9 oz)  Body mass index is 24.62 kg/m².    Intake/Output Summary (Last 24 hours) at 3/30/2022 1322  Last data filed at 3/30/2022 1128  Gross per 24 hour   Intake 300 ml   Output 1641 ml   Net -1341 ml      Physical Exam  Vitals and nursing note reviewed.   Constitutional:       General: She is sleeping. She is not in acute distress.     Appearance: She is normal weight.   HENT:      Head: Normocephalic and atraumatic.   Cardiovascular:      Rate and Rhythm: Tachycardia present.      Heart sounds: Murmur heard.   Pulmonary:      Effort: Pulmonary effort is normal.      Breath sounds: Normal breath sounds.   Abdominal:      General: Abdomen is flat. There is no distension.      Palpations: Abdomen is soft.      Tenderness: There is no abdominal tenderness.   Musculoskeletal:         General: Swelling (left great toe) present. No tenderness or signs of injury. Normal range of motion.      Cervical back: Normal range of motion and neck supple. No rigidity.   Feet:      Comments: All toenails are severely ingrown and dysmorphic and have not been trimmed in a very long time.  There is a small  wound at the lateral nail bed of the left great toe with purulence.  The left great toe is notably swollen and erythematous.  Lymphadenopathy:      Cervical: No cervical adenopathy.   Skin:     General: Skin is warm and dry.      Findings: Erythema (left great toe) present.      Comments: Very dry, flaky skin on feet   Neurological:      Mental Status: She is easily aroused. She is disoriented.      Cranial Nerves: No cranial nerve deficit.      Comments: Knows her name and , knows shes at [facility]       Significant Labs: All pertinent labs within the past 24 hours have been reviewed.    Significant Imaging: I have reviewed all pertinent imaging results/findings within the past 24 hours.

## 2022-03-30 NOTE — PROGRESS NOTES
Pharmacokinetic Assessment Follow Up: IV Vancomycin    Vancomycin serum concentration assessment(s):    The random level was drawn ~28 hrs post dose and can be used to guide therapy at this time. The measurement is within the desired definitive target range of 10 to 20 mcg/mL.    Vancomycin Regimen Plan:    · Continue to pulse dose. Give vancomycin 750 mg one time dose today after HD.  · Patient received HD today. Dialysis schedule is not documented in notes, nephrology following.  · Re-dose when the random level is less than 20 mcg/mL, next level to be drawn at 0400 on 03/31/22 with morning labs.    Drug levels (last 3 results):  Recent Labs   Lab Result Units 03/30/22  0536   Vancomycin, Random ug/mL 11.7       Pharmacy will continue to follow and monitor vancomycin.    Please contact pharmacy at extension [phone] for questions regarding this assessment.    Thank you for the consult,   Yaquelin Wilburn       Patient brief summary:  Kavya Figueroa is a 76 y.o. female initiated on antimicrobial therapy with IV Vancomycin for treatment of bone/joint infection        Drug Allergies:   Review of patient's allergies indicates:  No Known Allergies    Actual Body Weight:   69.2 kg    Renal Function:   Estimated Creatinine Clearance: 6.4 mL/min (A) (based on SCr of 6.8 mg/dL (H)).,     Dialysis Method (if applicable):  N/A    CBC (last 72 hours):  Recent Labs   Lab Result Units 03/28/22 2152 03/29/22 0531 03/30/22  0536   WBC K/uL 5.82 5.36 5.01   Hemoglobin g/dL 7.1* 6.8* 6.6*   Hemoglobin A1C %  --  6.5*  --    Hematocrit % 23.2* 22.1* 21.4*   Platelets K/uL 181 157 155   Gran % % 75.9* 67.1 65.2   Lymph % % 12.5* 17.9* 19.0   Mono % % 8.1 10.3 9.8   Eosinophil % % 2.9 3.9 5.2   Basophil % % 0.3 0.4 0.4   Differential Method  Automated Automated Automated       Metabolic Panel (last 72 hours):  Recent Labs   Lab Result Units 03/28/22 2152 03/29/22  0531 03/30/22  0536   Sodium mmol/L 140 140 140    Potassium mmol/L 4.3 4.2 4.5   Chloride mmol/L 98 99 99   CO2 mmol/L 31* 31* 28   Glucose mg/dL 236* 221* 194*   BUN mg/dL 21 23 34*   Creatinine mg/dL 4.7* 5.0* 6.8*   Albumin g/dL 2.6*  --   --    Total Bilirubin mg/dL 0.4  --   --    Alkaline Phosphatase U/L 79  --   --    AST U/L 7*  --   --    ALT U/L <5*  --   --    Magnesium mg/dL  --  2.0 2.0   Phosphorus mg/dL  --  4.3 5.5*       Vancomycin Administrations:  vancomycin given in the last 96 hours                   vancomycin in dextrose 5 % 1 gram/250 mL IVPB 1,000 mg (mg) 1,000 mg New Bag 03/29/22 0030                Microbiologic Results:  Microbiology Results (last 7 days)     Procedure Component Value Units Date/Time    Aerobic culture [[id number]]  (Abnormal) Collected: 03/29/22 1217    Order Status: Completed Specimen: Wound from Toe, Left Foot Updated: 03/30/22 0926     Aerobic Bacterial Culture STAPHYLOCOCCUS AUREUS  Moderate  Susceptibility pending  Skin mojgan also present      Culture, Anaerobe [[id number]] Collected: 03/29/22 1217    Order Status: Completed Specimen: Wound from Toe, Left Foot Updated: 03/30/22 0639     Anaerobic Culture Culture in progress    Gram stain [[id number]] Collected: 03/29/22 1217    Order Status: Completed Specimen: Wound from Toe, Left Foot Updated: 03/29/22 1406     Gram Stain Result Rare WBC's      Moderate Gram positive cocci

## 2022-03-30 NOTE — PROGRESS NOTES
3hr HD treatment completed 1L of fluid removed due to low bp. Epoetin given as ordered. Both needles of a RYAN fistula removed and pressure held until hemostasis achieved dressing applied. Report given to DONTE Chao.

## 2022-03-31 NOTE — BRIEF OP NOTE
[facility] - Surgery ([facility])  Brief Operative Note    SUMMARY     Surgery Date: 3/31/2022     Surgeon(s) and Role:     * Yennifer Matias DPM - Primary     * Kary Arteaga DPM - Resident - Assisting        Pre-op Diagnosis:  Osteomyelitis of great toe of left foot [M86.9]    Post-op Diagnosis:  Post-Op Diagnosis Codes:     * Osteomyelitis of great toe of left foot [M86.9]    Procedure(s) (LRB):  AMPUTATION, TOE HALLUX (Left)    Anesthesia: Local MAC    Operative Findings: disarticulation of the left hallux at the 1st MTPJ. Clean margins obtained from the distal 1st metatarsal head sent for culture and sensitivity and pathology    Estimated Blood Loss:  5 mL    Estimated Blood Loss has been documented.         Specimens:   Specimen (24h ago, onward)             Start     Ordered    03/31/22 1303  Specimen to Pathology, Surgery Other (Podiatry)  Once        Comments: Pre-op Diagnosis: Osteomyelitis of great toe of left foot [M86.9]    Procedure(s):  AMPUTATION, TOE LEFT     Number of specimens: 1    Name of specimens: Left Great Toe- Permanent   References:    Click here for ordering Quick Tip   Question Answer Comment   Procedure Type: Other Podiatry   Specimen Class: Routine/Screening    Release to patient Immediate        03/31/22 1300                [id number]    
Histology Text: Following the procedure a portion of the curetted material was sent for histologic evaluation.
Yes

## 2022-03-31 NOTE — SUBJECTIVE & OBJECTIVE
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    B12 deficiency 5/10/2021    Chronic diastolic congestive heart failure 12/18/2019    Dyslipidemia 3/31/2019    ESRD on hemodialysis 5/7/2021    Essential hypertension 3/31/2019    GERD (gastroesophageal reflux disease) 7/23/2021    History of myocardial infarction 7/23/2021    Secondary hyperparathyroidism of renal origin 3/31/2019    Type 2 diabetes mellitus, without long-term current use of insulin 3/31/2019       Past Surgical History:   Procedure Laterality Date    DECLOTTING OF VASCULAR GRAFT Left 5/26/2021    Procedure: DECLOT-GRAFT;  Surgeon: REYNALDO Watters II, MD;  Location: [facility] OR [address] [place] [address];  Service: Cardiovascular;  Laterality: Left;  10.5 min  59.97 mGy  9.4910 Gy.cm  20ml Dye    FISTULOTOMY      VENOPLASTY Left 5/26/2021    Procedure: ANGIOPLASTY, VEIN;  Surgeon: REYNALDO Watters II, MD;  Location: [facility] OR [address] [place] [address];  Service: Cardiovascular;  Laterality: Left;  balloon angioplasty       Review of patient's allergies indicates:  No Known Allergies    Medications:  Medications Prior to Admission   Medication Sig    acetaminophen (TYLENOL) 325 MG tablet Take 2 tablets (650 mg total) by mouth every 6 (six) hours as needed (for pain scale 1-4 or fever over 101).    apixaban (ELIQUIS) 2.5 mg Tab Take 1 tablet (2.5 mg total) by mouth 2 (two) times daily.    aspirin (ECOTRIN) 81 MG EC tablet Take 1 tablet (81 mg total) by mouth once daily.    atorvastatin (LIPITOR) 40 MG tablet Take 1 tablet (40 mg total) by mouth once daily.    calcium acetate,phosphat bind, (PHOSLO) 667 mg tablet Take 1 tablet (667 mg total) by mouth 3 (three) times daily with meals.    clopidogreL (PLAVIX) 75 mg tablet Take 1 tablet (75 mg total) by mouth once daily.    cyanocobalamin (VITAMIN B-12) 250 MCG tablet Take 1 tablet (250 mcg total) by mouth once daily.    famotidine (PEPCID) 20 MG tablet Take 1 tablet (20 mg total) by mouth once daily.    metoprolol  "tartrate (LOPRESSOR) 25 MG tablet Take 1 tablet (25 mg total) by mouth once daily.    polyethylene glycol (GLYCOLAX) 17 gram PwPk Take 17 g by mouth 2 (two) times daily as needed (Constipation).     Antibiotics (From admission, onward)                Start     Stop Route Frequency Ordered    03/31/22 1145  mupirocin 2 % ointment         04/05 0859 Nasl 2 times daily 03/31/22 1040    03/28/22 2351  vancomycin - pharmacy to dose  (vancomycin IVPB)        "And" Linked Group Details    -- IV pharmacy to manage frequency 03/28/22 2252          Antifungals (From admission, onward)                None          Antivirals (From admission, onward)      None             Immunization History   Administered Date(s) Administered    PPD Test 07/26/2021       Family History    None       Social History     Socioeconomic History    Marital status: Single   Tobacco Use    Smoking status: Never Smoker    Smokeless tobacco: Never Used   Substance and Sexual Activity    Alcohol use: Never    Drug use: Never    Sexual activity: Not Currently     Review of Systems   Unable to perform ROS: Dementia   Objective:     Vital Signs (Most Recent):  Temp: 97 °F (36.1 °C) (03/31/22 0956)  Pulse: (!) 19 (03/31/22 0956)  Resp: 17 (03/31/22 0956)  BP: (!) 141/64 (03/31/22 0956)  SpO2: 100 % (03/31/22 0956)   Vital Signs (24h Range):  Temp:  [97 °F (36.1 °C)-98.2 °F (36.8 °C)] 97 °F (36.1 °C)  Pulse:  [19-85] 19  Resp:  [16-19] 17  SpO2:  [96 %-100 %] 100 %  BP: (114-146)/(44-65) 141/64     Weight: 69.2 kg (152 lb 8.9 oz)  Body mass index is 24.62 kg/m².    Estimated Creatinine Clearance: 9.9 mL/min (A) (based on SCr of 4.4 mg/dL (H)).    Physical Exam  Vitals and nursing note reviewed.   Constitutional:       General: She is not in acute distress.     Appearance: She is well-developed. She is not diaphoretic.   HENT:      Head: Normocephalic and atraumatic.   Eyes:      Pupils: Pupils are equal, round, and reactive to light.   Cardiovascular:      " Rate and Rhythm: Normal rate and regular rhythm.      Heart sounds: Murmur heard.   Pulmonary:      Effort: Pulmonary effort is normal. No respiratory distress.      Breath sounds: Normal breath sounds. No wheezing.   Abdominal:      Palpations: Abdomen is soft.   Musculoskeletal:         General: No tenderness.      Comments: AJH MALDONADO site c/d/I    Skin:     General: Skin is warm and dry.      Coloration: Skin is not pale.      Findings: Erythema present.   Neurological:      Mental Status: She is alert.      Cranial Nerves: No cranial nerve deficit.      Coordination: Coordination normal.   Psychiatric:         Behavior: Behavior normal.         Thought Content: Thought content normal.       Significant Labs: All pertinent labs within the past 24 hours have been reviewed.    Significant Imaging: I have reviewed all pertinent imaging results/findings within the past 24 hours.

## 2022-03-31 NOTE — ASSESSMENT & PLAN NOTE
Nephrology consulted for dialysis; unclear if she got dialysis prior to being sent to the ED.  Plan for dialysis on 4/1/22

## 2022-03-31 NOTE — ASSESSMENT & PLAN NOTE
68-year-old woman with dementia sent to the ED from [facility] for osteomyelitis of left hallux. She was seen by podiatry and wound cultures +MSSA. She is scheduled for left hallux amputation today.     Zosyn discontinued. On vancomycin at this time. Remained afebrile, stable. No leukocytosis.     Recommendations  1. Left hallux amputation today. Dc vancomycin today. Can start cefazolin 1g daily. May d/c abx 24-48 hours after amputation as source control achieved  2. Local wound care per podiatry  3. Optimize nutrition status     Discussed with ID staff primary team and podiatry. Will sign off.

## 2022-03-31 NOTE — OP NOTE
[facility] - Surgery (2nd Fl)  Operative Note      Date of Procedure: 3/31/2022     Procedure: Procedure(s) (LRB):  AMPUTATION, TOE HALLUX (Left)     Surgeon(s) and Role:     * Yennifer Matias DPM - Primary     * Kary Arteaga DPM - Resident - Assisting        Pre-Operative Diagnosis: Osteomyelitis of great toe of left foot [M86.9]    Post-Operative Diagnosis: Post-Op Diagnosis Codes:     * Osteomyelitis of great toe of left foot [M86.9]    Anesthesia: Local MAC    Description of Technical Procedures:   The patient was brought to the operating room on a stretcher and remained on the stretcher in a supine position for the duration of the case. Following the successful induction of MAC anesthesia, a well padded nonsterile pneumatic tourniquet was applied to the left ankle. A local proximal benitez block was administered using 10 ml of 1% lidocaine plain and 2 mL of 0.5% bupivicaine plain. The left foot was prepped and draped in a sterile manner. A timeout was performed verifying the patient's identity, surgical site, allergies, and medications. All were in agreement. An esmarch bandage was used to exsanguinate the left  foot. Ankle tourniquet was inflated to 250mmHg.     Attention was directed to the left hallux. A #15 scalpel was utilized to create a full thickness modified racquet shaped incision from medial to lateral while encompassing the entire toe and attempting to salvage as much healthy dorsal tissue as possible to assist with primary closure. Using sharp dissection, the incision was deepened down to bone severing all neurovascular and tendon structures. The dissection was taken proximally along the base of the proximal phalanx until the 1st MTP joint was visualized.  The entire joint capsule and all soft tissue attachments to the hallux were released with a #15 blade, and the hallux was disarticulated at the level of the 1st MTP joint. The toe was sent for pathology. Next, the flexor and extensor  tendons were excised as far proximally as possible. The tibial sesamoid was protruding from the surgical site with hypertrophic changes and the tibial sesamoid was partially excised with a rongeur and #15 blade.     Copious irrigation to the surgical site was achieved with normal sterile saline solution. Remaining soft tissue and bone appeared viable. A rongeur was used to obtain a fragment of bone from the distal 1st metatarsal head and sent for culture and sensitvity and pathology as clean margins. 3-0 vicryl was used to close subcutaneous tissue layer and 4-0 nylon was utilized to reapproximate the skin using simple interrupted sutures. Tourniquet was deflated with brisk capillary refill noted to return to all digits on the left foot. The incision site was covered with xeroform, 4x4 gauze, and a dry sterile dressing consisting of kerlix and ACE bandage was applied to the left foot. Patient tolerated the procedure well with no apparent complications.      The patient was transferred to the recovery room with vital signs stable. Following a period of post op monitoring, the patient was returned to the hospital floor with the following post op instructions:   1. Keep dressing dry and intact until Podiatry follow up.   2.Weight bearing status: partial weightbearing to left heel if able  3. All necessary prescriptions ordered and medical management will continue.   4. Contact Podiatry for all post op follow up care and if any problems arise.    Estimated Blood Loss (EBL): 5 mL          Implants: * No implants in log *    Specimens:   Specimen (24h ago, onward)             Start     Ordered    03/31/22 6485  Specimen to Pathology, Surgery Other (podiatry)  Once        Comments: Pre-op Diagnosis: Osteomyelitis of great toe of left foot [M86.9]    Procedure(s):  AMPUTATION, TOE LEFT     Number of specimens: 2      Name of specimens: 1) Left Great Toe- Permanent  2) Clean margin first metatarsal- Permanent   References:     Click here for ordering Quick Tip   Question Answer Comment   Procedure Type: Other podiatry   Release to patient Immediate        03/31/22 1340    03/31/22 1303  Specimen to Pathology, Surgery Other (Podiatry)  Once        Comments: Pre-op Diagnosis: Osteomyelitis of great toe of left foot [M86.9]    Procedure(s):  AMPUTATION, TOE LEFT     Number of specimens: 1    Name of specimens: Left Great Toe- Permanent   References:    Click here for ordering Quick Tip   Question Answer Comment   Procedure Type: Other Podiatry   Specimen Class: Routine/Screening    Release to patient Immediate        03/31/22 1305                        Condition: Good    Disposition: PACU - hemodynamically stable.    Attestation: I was present and scrubbed for the entire procedure.

## 2022-03-31 NOTE — NURSING
Pt confused, agitated and made several attempts to exit bed. VSS.  mg/dl. PRN pain med administered. Will continue to monitor.

## 2022-03-31 NOTE — TRANSFER OF CARE
"Anesthesia Transfer of Care Note    Patient: Kavya Figueroa    Procedure(s) Performed: Procedure(s) (LRB):  AMPUTATION, TOE LEFT (Left)    Patient location: PACU    Transport from OR: Transported from OR on 6-10 L/min O2 by face mask with adequate spontaneous ventilation    Post pain: adequate analgesia    Post assessment: no apparent anesthetic complications    Level of consciousness: sedated    Nausea/Vomiting: no nausea/vomiting    Complications: none    Transfer of care protocol was followed      Last vitals:   Visit Vitals  BP (!) 96/47   Pulse 62   Temp 36.3 °C (97.3 °F) (Temporal)   Resp 16   Ht 5' 6" (1.676 m)   Wt 69.2 kg (152 lb 8.9 oz)   SpO2 100%   Breastfeeding No   BMI 24.62 kg/m²     "

## 2022-03-31 NOTE — ASSESSMENT & PLAN NOTE
Hemoglobin nearly at transfusion threshold, but patient not able to consent.  Will need to reach surrogate decision maker in the morning to consent for blood if transfusion is indicated.   Her BL has wandered from 6-9  Suspect acute infection contributing to anemia  Continue to monitor closely

## 2022-03-31 NOTE — PROGRESS NOTES
[facility] - Telemetry Stepdown ([address] Medicine  Progress Note    Patient Name: Kavya Figueroa  MRN: [id number]  Patient Class: IP- Inpatient   Admission Date: 3/28/2022  Length of Stay: 1 days  Attending Physician: Abram Coello MD  Primary Care Provider: Neeru Benites MD        Subjective:     Principal Problem:Acute hematogenous osteomyelitis of left foot        HPI:  68-year-old woman with dementia sent to the ED from [facility] for infection of left great toe.  She is unable to provide much useful history due to dementia, so history largely obtained from ED notes and nursing home records.  Her left great toe was noted to have been swollen with an area of inflammation extending from the toenail bed so she was sent to the ED for evaluation.      Overview/Hospital Course:  Patient admitted for osteomyelitis of her L great toe as confirmed on MRI. Started on IV Vanc and Zosyn. Podiatry consulted, cleaned at bedside, cultures growing staph. Podiatry considering bone biopsy vs amputation. Patient and family wish to proceed with amputation. Left big toe amputated successfully with clean margins by podiatry on 3/31/22. ID consulted and recommend to d/c vanc/zosyn and initiate cefazolin 1g daily for 24-48 hours post-amputation. Continue local wound care by podiatry.      Interval History: Patient seen and examined today. FELECIA GOMEZ. Podiatry successfully performed amputation of left great toe today. ID recommend d/c vancomycin and initiate cefazolin IV 1g daily for 24-48 post-amputation. Plan for dialysis in the morning.     Review of Systems   Unable to perform ROS: Dementia   Constitutional:  Negative for fever.   Musculoskeletal:  Positive for gait problem.   Skin:  Positive for wound.   Objective:     Vital Signs (Most Recent):  Temp: 97.3 °F (36.3 °C) (03/31/22 1331)  Pulse: 66 (03/31/22 1422)  Resp: 11 (03/31/22 1422)  BP: (!) 115/56 (03/31/22 1415)  SpO2: 100 %  (22 1422)   Vital Signs (24h Range):  Temp:  [97 °F (36.1 °C)-98.2 °F (36.8 °C)] 97.3 °F (36.3 °C)  Pulse:  [19-85] 66  Resp:  [10-19] 11  SpO2:  [97 %-100 %] 100 %  BP: ()/(47-65) 115/56     Weight: 69.2 kg (152 lb 8.9 oz)  Body mass index is 24.62 kg/m².    Intake/Output Summary (Last 24 hours) at 3/31/2022 1543  Last data filed at 3/31/2022 1323  Gross per 24 hour   Intake 350 ml   Output --   Net 350 ml      Physical Exam  Vitals and nursing note reviewed.   Constitutional:       General: She is sleeping. She is not in acute distress.     Appearance: She is normal weight.   HENT:      Head: Normocephalic and atraumatic.   Cardiovascular:      Rate and Rhythm: Tachycardia present.      Heart sounds: Murmur heard.   Pulmonary:      Effort: Pulmonary effort is normal.      Breath sounds: Normal breath sounds.   Abdominal:      General: Abdomen is flat. There is no distension.      Palpations: Abdomen is soft.      Tenderness: There is no abdominal tenderness.   Musculoskeletal:         General: Swelling (left great toe) present. No tenderness or signs of injury. Normal range of motion.      Cervical back: Normal range of motion and neck supple. No rigidity.   Feet:      Comments: All toenails are severely ingrown and dysmorphic and have not been trimmed in a very long time.  There is a small wound at the lateral nail bed of the left great toe with purulence.  The left great toe is notably swollen and erythematous.  Lymphadenopathy:      Cervical: No cervical adenopathy.   Skin:     General: Skin is warm and dry.      Findings: Erythema (left great toe) present.      Comments: Very dry, flaky skin on feet   Neurological:      Mental Status: She is easily aroused. She is disoriented.      Cranial Nerves: No cranial nerve deficit.      Comments: Knows her name and , knows shes at [facility]       Significant Labs: All pertinent labs within the past 24 hours have been reviewed.  BMP:   Recent Labs   Lab  03/31/22  0357   *      K 4.1      CO2 24   BUN 15   CREATININE 4.4*   CALCIUM 10.0   MG 2.0     CBC:   Recent Labs   Lab 03/30/22  0536 03/31/22  0357   WBC 5.01 5.25   HGB 6.6* 6.7*   HCT 21.4* 22.1*    170       Significant Imaging: I have reviewed all pertinent imaging results/findings within the past 24 hours.      Assessment/Plan:      * Acute hematogenous osteomyelitis of left foot  Podiatry consulted for further evaluation - amputation of left big toe successfully performed on 3/31/22  - X-ray of the foot not suggestive of osteomyelitis  - Podiatry consulted and MRI ordered which does show evidence of osteomyelitis  - cultures taken at bedside, growing staph  - continuing IV Vanc and Zosyn  - ID consulted, recommend d/x vanc and continue cefazolin 1g for 24-48 hours post-amputation      ESRD on hemodialysis  Nephrology consulted for dialysis; unclear if she got dialysis prior to being sent to the ED.  Plan for dialysis on 4/1/22      Anemia due to end stage renal disease  Hemoglobin nearly at transfusion threshold, but patient not able to consent.  Will need to reach surrogate decision maker in the morning to consent for blood if transfusion is indicated.   Her BL has wandered from 6-9  Suspect acute infection contributing to anemia  Continue to monitor closely      Type 2 diabetes mellitus with diabetic nephropathy, with long-term current use of insulin  Per nursing home MAR, appears to only be on sliding scale insulin.  Last hemoglobin A1c 5.9.  Renal diet ordered; she will not likely need supplemental insulin due to decreased renal clearance in ESRD.        VTE Risk Mitigation (From admission, onward)         Ordered     heparin (porcine) injection 1,000 Units  As needed (PRN)         03/29/22 0903     apixaban tablet 2.5 mg  2 times daily         03/29/22 0311     Reason for No Pharmacological VTE Prophylaxis  Once        Question:  Reasons:  Answer:  Already adequately  anticoagulated on oral Anticoagulants    03/29/22 0311     IP VTE HIGH RISK PATIENT  Once         03/29/22 0311     Place sequential compression device  Until discontinued         03/29/22 0311                Discharge Planning   NGA: 4/1/2022     Code Status: Full Code   Is the patient medically ready for discharge?: No    Reason for patient still in hospital (select all that apply): Patient trending condition, Treatment and Consult recommendations  Discharge Plan A: Return to nursing home          Discussed patient's plan of care with Dr. Oumou Nix PA-C  Department of Hospital Medicine   [facility] - Telemetry Stepdown ([place]-)

## 2022-03-31 NOTE — PROGRESS NOTES
Pharmacokinetic Assessment Follow Up: IV Vancomycin    Vancomycin serum concentration assessment(s):    The random level was drawn correctly and can be used to guide therapy at this time. The measurement is within the desired definitive target range of 10 to 20 mcg/mL.    Vancomycin Regimen Plan:    No vancomycin dose will be administered today. Next dialysis is scheduled for 4/1. Level will be drawn with AM labs on 4/1 for pre-HD level.    Drug levels (last 3 results):  Recent Labs   Lab Result Units 03/30/22 0536 03/31/22  0357   Vancomycin, Random ug/mL 11.7 16.9       Pharmacy will continue to follow and monitor vancomycin.    Please contact pharmacy for questions regarding this assessment.    Thank you for the consult,   Aylin Mccann       Patient brief summary:  Kavya Figueroa is a 76 y.o. female initiated on antimicrobial therapy with IV Vancomycin for treatment of bone/joint infection.    Drug Allergies:   Review of patient's allergies indicates:  No Known Allergies    Actual Body Weight:   69.2 kg    Renal Function:   Estimated Creatinine Clearance: 9.9 mL/min (A) (based on SCr of 4.4 mg/dL (H)).,     Dialysis Method (if applicable):  intermittent HD    CBC (last 72 hours):  Recent Labs   Lab Result Units 03/28/22 2152 03/29/22 0531 03/30/22 0536 03/31/22  0357   WBC K/uL 5.82 5.36 5.01 5.25   Hemoglobin g/dL 7.1* 6.8* 6.6* 6.7*   Hemoglobin A1C %  --  6.5*  --   --    Hematocrit % 23.2* 22.1* 21.4* 22.1*   Platelets K/uL 181 157 155 170   Gran % % 75.9* 67.1 65.2 61.9   Lymph % % 12.5* 17.9* 19.0 21.9   Mono % % 8.1 10.3 9.8 9.9   Eosinophil % % 2.9 3.9 5.2 5.3   Basophil % % 0.3 0.4 0.4 0.4   Differential Method  Automated Automated Automated Automated       Metabolic Panel (last 72 hours):  Recent Labs   Lab Result Units 03/28/22 2152 03/29/22 0531 03/30/22 0536 03/31/22  0357   Sodium mmol/L 140 140 140 140   Potassium mmol/L 4.3 4.2 4.5 4.1   Chloride mmol/L 98 99 99 105   CO2 mmol/L  31* 31* 28 24   Glucose mg/dL 236* 221* 194* 132*   BUN mg/dL 21 23 34* 15   Creatinine mg/dL 4.7* 5.0* 6.8* 4.4*   Albumin g/dL 2.6*  --   --   --    Total Bilirubin mg/dL 0.4  --   --   --    Alkaline Phosphatase U/L 79  --   --   --    AST U/L 7*  --   --   --    ALT U/L <5*  --   --   --    Magnesium mg/dL  --  2.0 2.0 2.0   Phosphorus mg/dL  --  4.3 5.5* 3.8       Vancomycin Administrations:  vancomycin given in the last 96 hours                     vancomycin 750 mg in dextrose 5 % 250 mL IVPB (ready to mix system) (mg) 750 mg New Bag 03/30/22 1258    vancomycin in dextrose 5 % 1 gram/250 mL IVPB 1,000 mg (mg) 1,000 mg New Bag 03/29/22 0030                    Microbiologic Results:  Microbiology Results (last 7 days)       Procedure Component Value Units Date/Time    Culture, Anaerobe [[id number]] Collected: 03/29/22 1217    Order Status: Completed Specimen: Wound from Toe, Left Foot Updated: 03/31/22 0859     Anaerobic Culture Culture in progress    Blood culture [[id number]] Collected: 03/30/22 1514    Order Status: Completed Specimen: Blood Updated: 03/31/22 0115     Blood Culture, Routine No Growth to date    Narrative:      Collection has been rescheduled by [facility] at 03/30/2022 12:04 Reason:   Patient unavailable, in dialysis, nurse annelise notified.  Collection has been rescheduled by [facility] at 03/30/2022 14:01 Reason:   Patient unavailable, dialysis said that patient is off machine and   waiting on transport to bring back up, nurse annelise notified   Collection has been rescheduled by T at 03/30/2022 12:04 Reason:   Patient unavailable, in dialysis, nurse annelise notified.  Collection has been rescheduled by T at 03/30/2022 14:01 Reason:   Patient unavailable, dialysis said that patient is off machine and   waiting on transport to bring back up, nurse annelise notified     Blood culture [[id number]] Collected: 03/30/22 1515    Order Status: Completed Specimen: Blood Updated: 03/31/22 0115     Blood Culture, Routine No  Growth to date    Narrative:      Collection has been rescheduled by T at 03/30/2022 12:04 Reason:   Patient unavailable, in dialysis, nurse annelise notified.  Collection has been rescheduled by T at 03/30/2022 14:01 Reason:   Patient unavailable, dialysis said that patient is off machine and   waiting on transport to bring back up, nurse annelise notified   Collection has been rescheduled by T at 03/30/2022 12:04 Reason:   Patient unavailable, in dialysis, nurse annelise notified.  Collection has been rescheduled by T at 03/30/2022 14:01 Reason:   Patient unavailable, dialysis said that patient is off machine and   waiting on transport to bring back up, nurse annelise notified     Aerobic culture [[id number]]  (Abnormal) Collected: 03/29/22 1217    Order Status: Completed Specimen: Wound from Toe, Left Foot Updated: 03/30/22 0926     Aerobic Bacterial Culture STAPHYLOCOCCUS AUREUS  Moderate  Susceptibility pending  Skin mojgan also present      Gram stain [[id number]] Collected: 03/29/22 1217    Order Status: Completed Specimen: Wound from Toe, Left Foot Updated: 03/29/22 1406     Gram Stain Result Rare WBC's      Moderate Gram positive cocci

## 2022-03-31 NOTE — INTERVAL H&P NOTE
The patient has been examined and the H&P has been reviewed:    I concur with the findings and no changes have occurred since H&P was written.    Surgery risks, benefits and alternative options discussed and understood by patient/family.          Active Hospital Problems    Diagnosis  POA    *Acute hematogenous osteomyelitis of left foot [M86.072]  Yes    ESRD on hemodialysis [N18.6, Z99.2]  Not Applicable     Chronic    Type 2 diabetes mellitus with diabetic nephropathy, with long-term current use of insulin [E11.21, Z79.4]  Not Applicable    Anemia due to end stage renal disease [N18.6, D63.1]  Yes     Chronic      Resolved Hospital Problems   No resolved problems to display.

## 2022-03-31 NOTE — CONSULTS
"[facility] ([place] [address])  Infectious Disease  Consult Note    Patient Name: Kavya Figueroa  MRN: [id number]  Admission Date: 3/28/2022  Hospital Length of Stay: 1 days  Attending Physician: Abram Coello MD  Primary Care Provider: Neeru Benites MD     Isolation Status: No active isolations      Consults  Assessment/Plan:     * Acute hematogenous osteomyelitis of left foot  68-year-old woman with dementia sent to the ED from [facility] for osteomyelitis of left hallux. She was seen by podiatry and wound cultures +MSSA. She is scheduled for left hallux amputation today.     Zosyn discontinued. On vancomycin at this time. Remained afebrile, stable. No leukocytosis.     Recommendations  1. Left hallux amputation today. Dc vancomycin today. Can start cefazolin 1g daily. May d/c abx 24-48 hours after amputation as source control achieved  2. Local wound care per podiatry  3. Optimize nutrition status     Discussed with ID staff primary team and podiatry. Will sign off.         Thank you for your consult. I will sign off. Please contact us if you have any additional questions.    Yeny Hutchinson PA-C  Infectious Disease  [facility]    Subjective:     Principal Problem: Acute hematogenous osteomyelitis of left foot    HPI: History obtained via chart review    " 68-year-old woman with dementia sent to the ED from [facility] for infection of left great toe.  She is unable to provide much useful history due to dementia, so history largely obtained from ED notes and nursing home records.  Her left great toe was noted to have been swollen with an area of inflammation extending from the toenail bed so she was sent to the ED for evaluation.    Patient admitted for osteomyelitis of her L great toe as confirmed on MRI. Started on IV Vanc and Zosyn. Podiatry consulted, cleaned at bedside, cultures growing staph. She is scheduled for left " "hallux amputation with podiatry today "     Zosyn discontinued. On vancomycin at this time. Remained afebrile, stable. No leukocytosis.       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    B12 deficiency 5/10/2021    Chronic diastolic congestive heart failure 12/18/2019    Dyslipidemia 3/31/2019    ESRD on hemodialysis 5/7/2021    Essential hypertension 3/31/2019    GERD (gastroesophageal reflux disease) 7/23/2021    History of myocardial infarction 7/23/2021    Secondary hyperparathyroidism of renal origin 3/31/2019    Type 2 diabetes mellitus, without long-term current use of insulin 3/31/2019       Past Surgical History:   Procedure Laterality Date    DECLOTTING OF VASCULAR GRAFT Left 5/26/2021    Procedure: DECLOT-GRAFT;  Surgeon: REYNALDO Watters II, MD;  Location: [facility] OR [address] [place] [address];  Service: Cardiovascular;  Laterality: Left;  10.5 min  59.97 mGy  9.4910 Gy.cm  20ml Dye    FISTULOTOMY      VENOPLASTY Left 5/26/2021    Procedure: ANGIOPLASTY, VEIN;  Surgeon: REYNALDO Watters II, MD;  Location: [facility] OR [address] [place] [address];  Service: Cardiovascular;  Laterality: Left;  balloon angioplasty       Review of patient's allergies indicates:  No Known Allergies    Medications:  Medications Prior to Admission   Medication Sig    acetaminophen (TYLENOL) 325 MG tablet Take 2 tablets (650 mg total) by mouth every 6 (six) hours as needed (for pain scale 1-4 or fever over 101).    apixaban (ELIQUIS) 2.5 mg Tab Take 1 tablet (2.5 mg total) by mouth 2 (two) times daily.    aspirin (ECOTRIN) 81 MG EC tablet Take 1 tablet (81 mg total) by mouth once daily.    atorvastatin (LIPITOR) 40 MG tablet Take 1 tablet (40 mg total) by mouth once daily.    calcium acetate,phosphat bind, (PHOSLO) 667 mg tablet Take 1 tablet (667 mg total) by mouth 3 (three) times daily with meals.    clopidogreL (PLAVIX) 75 mg tablet Take 1 tablet (75 mg total) by mouth once daily.    cyanocobalamin (VITAMIN B-12) 250 MCG " "tablet Take 1 tablet (250 mcg total) by mouth once daily.    famotidine (PEPCID) 20 MG tablet Take 1 tablet (20 mg total) by mouth once daily.    metoprolol tartrate (LOPRESSOR) 25 MG tablet Take 1 tablet (25 mg total) by mouth once daily.    polyethylene glycol (GLYCOLAX) 17 gram PwPk Take 17 g by mouth 2 (two) times daily as needed (Constipation).     Antibiotics (From admission, onward)                Start     Stop Route Frequency Ordered    03/31/22 1145  mupirocin 2 % ointment         04/05 0859 Nasl 2 times daily 03/31/22 1040    03/28/22 2351  vancomycin - pharmacy to dose  (vancomycin IVPB)        "And" Linked Group Details    -- IV pharmacy to manage frequency 03/28/22 9479          Antifungals (From admission, onward)                None          Antivirals (From admission, onward)      None             Immunization History   Administered Date(s) Administered    PPD Test 07/26/2021       Family History    None       Social History     Socioeconomic History    Marital status: Single   Tobacco Use    Smoking status: Never Smoker    Smokeless tobacco: Never Used   Substance and Sexual Activity    Alcohol use: Never    Drug use: Never    Sexual activity: Not Currently     Review of Systems   Unable to perform ROS: Dementia   Objective:     Vital Signs (Most Recent):  Temp: 97 °F (36.1 °C) (03/31/22 0956)  Pulse: (!) 19 (03/31/22 0956)  Resp: 17 (03/31/22 0956)  BP: (!) 141/64 (03/31/22 0956)  SpO2: 100 % (03/31/22 0956)   Vital Signs (24h Range):  Temp:  [97 °F (36.1 °C)-98.2 °F (36.8 °C)] 97 °F (36.1 °C)  Pulse:  [19-85] 19  Resp:  [16-19] 17  SpO2:  [96 %-100 %] 100 %  BP: (114-146)/(44-65) 141/64     Weight: 69.2 kg (152 lb 8.9 oz)  Body mass index is 24.62 kg/m².    Estimated Creatinine Clearance: 9.9 mL/min (A) (based on SCr of 4.4 mg/dL (H)).    Physical Exam  Vitals and nursing note reviewed.   Constitutional:       General: She is not in acute distress.     Appearance: She is well-developed. " She is not diaphoretic.   HENT:      Head: Normocephalic and atraumatic.   Eyes:      Pupils: Pupils are equal, round, and reactive to light.   Cardiovascular:      Rate and Rhythm: Normal rate and regular rhythm.      Heart sounds: Murmur heard.   Pulmonary:      Effort: Pulmonary effort is normal. No respiratory distress.      Breath sounds: Normal breath sounds. No wheezing.   Abdominal:      Palpations: Abdomen is soft.   Musculoskeletal:         General: No tenderness.      Comments: LUE AVG site c/d/I    Skin:     General: Skin is warm and dry.      Coloration: Skin is not pale.      Findings: Erythema present.   Neurological:      Mental Status: She is alert.      Cranial Nerves: No cranial nerve deficit.      Coordination: Coordination normal.   Psychiatric:         Behavior: Behavior normal.         Thought Content: Thought content normal.       Significant Labs: All pertinent labs within the past 24 hours have been reviewed.    Significant Imaging: I have reviewed all pertinent imaging results/findings within the past 24 hours.

## 2022-03-31 NOTE — PLAN OF CARE
Provided care according to plan of care. AxOx2. Self and place. Patient dialyzed removed 1L over 3 hours. Turned Q2H. Purple wedge in place. Patient resting comfortably in bed.

## 2022-03-31 NOTE — SUBJECTIVE & OBJECTIVE
Interval History: Patient seen and examined today. FELECIA GOMEZ. Podiatry successfully performed amputation of left great toe today. ID recommend d/c vancomycin and initiate cefazolin IV 1g daily for 24-48 post-amputation. Plan for dialysis in the morning.     Review of Systems   Unable to perform ROS: Dementia   Constitutional:  Negative for fever.   Musculoskeletal:  Positive for gait problem.   Skin:  Positive for wound.   Objective:     Vital Signs (Most Recent):  Temp: 97.3 °F (36.3 °C) (03/31/22 1331)  Pulse: 66 (03/31/22 1422)  Resp: 11 (03/31/22 1422)  BP: (!) 115/56 (03/31/22 1415)  SpO2: 100 % (03/31/22 1422)   Vital Signs (24h Range):  Temp:  [97 °F (36.1 °C)-98.2 °F (36.8 °C)] 97.3 °F (36.3 °C)  Pulse:  [19-85] 66  Resp:  [10-19] 11  SpO2:  [97 %-100 %] 100 %  BP: ()/(47-65) 115/56     Weight: 69.2 kg (152 lb 8.9 oz)  Body mass index is 24.62 kg/m².    Intake/Output Summary (Last 24 hours) at 3/31/2022 1543  Last data filed at 3/31/2022 1323  Gross per 24 hour   Intake 350 ml   Output --   Net 350 ml      Physical Exam  Vitals and nursing note reviewed.   Constitutional:       General: She is sleeping. She is not in acute distress.     Appearance: She is normal weight.   HENT:      Head: Normocephalic and atraumatic.   Cardiovascular:      Rate and Rhythm: Tachycardia present.      Heart sounds: Murmur heard.   Pulmonary:      Effort: Pulmonary effort is normal.      Breath sounds: Normal breath sounds.   Abdominal:      General: Abdomen is flat. There is no distension.      Palpations: Abdomen is soft.      Tenderness: There is no abdominal tenderness.   Musculoskeletal:         General: Swelling (left great toe) present. No tenderness or signs of injury. Normal range of motion.      Cervical back: Normal range of motion and neck supple. No rigidity.   Feet:      Comments: All toenails are severely ingrown and dysmorphic and have not been trimmed in a very long time.  There is a small wound at the  lateral nail bed of the left great toe with purulence.  The left great toe is notably swollen and erythematous.  Lymphadenopathy:      Cervical: No cervical adenopathy.   Skin:     General: Skin is warm and dry.      Findings: Erythema (left great toe) present.      Comments: Very dry, flaky skin on feet   Neurological:      Mental Status: She is easily aroused. She is disoriented.      Cranial Nerves: No cranial nerve deficit.      Comments: Knows her name and , knows shes at [facility]       Significant Labs: All pertinent labs within the past 24 hours have been reviewed.  BMP:   Recent Labs   Lab 22  0357   *      K 4.1      CO2 24   BUN 15   CREATININE 4.4*   CALCIUM 10.0   MG 2.0     CBC:   Recent Labs   Lab 22  0536 22  0357   WBC 5.01 5.25   HGB 6.6* 6.7*   HCT 21.4* 22.1*    170       Significant Imaging: I have reviewed all pertinent imaging results/findings within the past 24 hours.

## 2022-03-31 NOTE — PROGRESS NOTES
This writer spoke to Zhanna , floor nurse, regarding the patient's tongue. Her tongue is blue. This writer verified the patient has not eaten anything today. Zhanna states one of the patient's medications is blue.

## 2022-03-31 NOTE — HPI
"History obtained via chart review    " 68-year-old woman with dementia sent to the ED from [facility] for infection of left great toe.  She is unable to provide much useful history due to dementia, so history largely obtained from ED notes and nursing home records.  Her left great toe was noted to have been swollen with an area of inflammation extending from the toenail bed so she was sent to the ED for evaluation.    Patient admitted for osteomyelitis of her L great toe as confirmed on MRI. Started on IV Vanc and Zosyn. Podiatry consulted, cleaned at bedside, cultures growing staph. She is scheduled for left hallux amputation with podiatry today "     Zosyn discontinued. On vancomycin at this time. Remained afebrile, stable. No leukocytosis.   "

## 2022-03-31 NOTE — PROGRESS NOTES
Kavya Figueroa [id number] is a 76 y.o. female who had been consulted for vancomycin dosing.    Vancomycin has been discontinued.  Pharmacy consult for vancomycin dosing is no longer required.    Thank you for allowing us to participate in this patient's care.     Aylin Mccann

## 2022-03-31 NOTE — PLAN OF CARE
Problem: Adult Inpatient Plan of Care  Goal: Plan of Care Review  Outcome: Ongoing, Progressing  Goal: Patient-Specific Goal (Individualized)  Outcome: Ongoing, Progressing  Goal: Absence of Hospital-Acquired Illness or Injury  Outcome: Ongoing, Progressing  Goal: Optimal Comfort and Wellbeing  Outcome: Ongoing, Progressing  Goal: Readiness for Transition of Care  Outcome: Ongoing, Progressing     Problem: Diabetes Comorbidity  Goal: Blood Glucose Level Within Targeted Range  Outcome: Ongoing, Progressing     Problem: Impaired Wound Healing  Goal: Optimal Wound Healing  Outcome: Ongoing, Progressing     Problem: Skin Injury Risk Increased  Goal: Skin Health and Integrity  Outcome: Ongoing, Progressing     Problem: Device-Related Complication Risk (Hemodialysis)  Goal: Safe, Effective Therapy Delivery  Outcome: Ongoing, Progressing     Problem: Hemodynamic Instability (Hemodialysis)  Goal: Effective Tissue Perfusion  Outcome: Ongoing, Progressing     Problem: Infection (Hemodialysis)  Goal: Absence of Infection Signs and Symptoms  Outcome: Ongoing, Progressing     Problem: Electrolyte Imbalance (Chronic Kidney Disease)  Goal: Electrolyte Balance  Outcome: Ongoing, Progressing     Problem: Fluid Volume Excess (Chronic Kidney Disease)  Goal: Fluid Balance  Outcome: Ongoing, Progressing

## 2022-03-31 NOTE — PROGRESS NOTES
Surgical safety checklist completed. Unable to complete full body assessment , ready for patient in the OR. The patient is inpatient.

## 2022-03-31 NOTE — ASSESSMENT & PLAN NOTE
Podiatry consulted for further evaluation - amputation of left big toe successfully performed on 3/31/22  - X-ray of the foot not suggestive of osteomyelitis  - Podiatry consulted and MRI ordered which does show evidence of osteomyelitis  - cultures taken at bedside, growing staph  - continuing IV Vanc and Zosyn  - ID consulted, recommend d/x vanc and continue cefazolin 1g for 24-48 hours post-amputation

## 2022-04-01 NOTE — ASSESSMENT & PLAN NOTE
MRI left foot demonstrating osteomyelitis of the left distal phalanx left hallux. Now s/p left great toe amputation on 3/31/2022    Plan:  Intraop bone margins with no growth to date  IV Ancef for 24-48 hours post op for SSTI  Ok to discharge patient from podiatry standpoint  Patient nonambulatory at baseline  Continue local wound care in the interim with betadine. Nursing orders placed for wound care.   Podiatry will continue to follow    DC Instructions:  Patient is to follow up with podiatry within 14 days of discharge with Dr. Mckeon. Podiatry will arrange an appointment.  Facility to do dressing changes every MWF and prn as follows:  Cleanse left toe amputation site with saline or wound cleanser, paint incision site with betadine, cover with xeroform, 4x4 gauze, kerlix, secure with tape.

## 2022-04-01 NOTE — PROGRESS NOTES
[facility] - Telemetry Stepdown ([address] Medicine  Progress Note    Patient Name: Kavya Figueroa  MRN: [id number]  Patient Class: IP- Inpatient   Admission Date: 3/28/2022  Length of Stay: 2 days  Attending Physician: Abram Coello MD  Primary Care Provider: Neeru Benites MD        Subjective:     Principal Problem:Acute hematogenous osteomyelitis of left foot        HPI:  68-year-old woman with dementia sent to the ED from [facility] nursing home for infection of left great toe.  She is unable to provide much useful history due to dementia, so history largely obtained from ED notes and nursing home records.  Her left great toe was noted to have been swollen with an area of inflammation extending from the toenail bed so she was sent to the ED for evaluation.      Overview/Hospital Course:  Patient admitted for osteomyelitis of her L great toe as confirmed on MRI. Started on IV Vanc and Zosyn. Podiatry consulted, cleaned at bedside, cultures growing staph. Podiatry considering bone biopsy vs amputation. Patient and family wish to proceed with amputation. Left big toe amputated successfully with clean margins by podiatry on 3/31/22. ID consulted and recommend to d/c vanc/zosyn and initiate cefazolin 1g daily for 24-48 hours post-amputation. Continue local wound care by podiatry.      Interval History: Patient seen and examined today. Hypertensive overnight, will continue to monitor closely after dialysis. Patient appears uncomfortable and states that her left foot is bothering her. Continue prn pain medication. Continue cefazolin and plan to discharge back to NH tomorrow.     Review of Systems   Unable to perform ROS: Dementia   Constitutional:  Negative for fever.   Musculoskeletal:  Positive for gait problem.   Skin:  Positive for wound.   Objective:     Vital Signs (Most Recent):  Temp: 98.1 °F (36.7 °C) (04/01/22 1320)  Pulse: 79 (04/01/22 1415)  Resp: 17 (04/01/22 1320)  BP: (!)  159/51 (22 1415)  SpO2: 99 % (22 0515)   Vital Signs (24h Range):  Temp:  [96.5 °F (35.8 °C)-98.7 °F (37.1 °C)] 98.1 °F (36.7 °C)  Pulse:  [67-86] 79  Resp:  [8-22] 17  SpO2:  [96 %-100 %] 99 %  BP: (140-166)/(51-99) 159/51     Weight: 69.2 kg (152 lb 8.9 oz)  Body mass index is 24.62 kg/m².  No intake or output data in the 24 hours ending 22 1434   Physical Exam  Vitals and nursing note reviewed.   Constitutional:       General: She is sleeping. She is not in acute distress.     Appearance: She is normal weight.   HENT:      Head: Normocephalic and atraumatic.   Cardiovascular:      Rate and Rhythm: Tachycardia present.      Heart sounds: Murmur heard.   Pulmonary:      Effort: Pulmonary effort is normal.      Breath sounds: Normal breath sounds.   Abdominal:      General: Abdomen is flat. There is no distension.      Palpations: Abdomen is soft.      Tenderness: There is no abdominal tenderness.   Musculoskeletal:         General: Swelling (left great toe) present. No tenderness or signs of injury. Normal range of motion.      Cervical back: Normal range of motion and neck supple. No rigidity.   Feet:      Comments: All toenails are severely ingrown and dysmorphic and have not been trimmed in a very long time.  There is a small wound at the lateral nail bed of the left great toe with purulence.  The left great toe is notably swollen and erythematous.  Lymphadenopathy:      Cervical: No cervical adenopathy.   Skin:     General: Skin is warm and dry.      Findings: Erythema (left great toe) present.      Comments: Very dry, flaky skin on feet   Neurological:      Mental Status: She is easily aroused. She is disoriented.      Cranial Nerves: No cranial nerve deficit.      Comments: Knows her name and , knows shes at [facility]       Significant Labs: All pertinent labs within the past 24 hours have been reviewed.  BMP:   Recent Labs   Lab 22  0417   *      K 4.7      CO2 25    BUN 25*   CREATININE 5.8*   CALCIUM 9.6   MG 2.0     CBC:   Recent Labs   Lab 03/31/22  0357 04/01/22  0417   WBC 5.25 5.28   HGB 6.7* 6.9*   HCT 22.1* 22.3*    164       Significant Imaging: I have reviewed all pertinent imaging results/findings within the past 24 hours.      Assessment/Plan:      * Acute hematogenous osteomyelitis of left foot  Podiatry consulted for further evaluation - amputation of left big toe successfully performed on 3/31/22  - X-ray of the foot not suggestive of osteomyelitis  - Podiatry consulted and MRI ordered which does show evidence of osteomyelitis  - cultures taken at bedside, growing staph  - continuing IV Vanc and Zosyn  - ID consulted, recommend d/c vanc and continue cefazolin 1g for 24-48 hours post-amputation  - pain medication prn    ESRD on hemodialysis  Nephrology consulted for dialysis; unclear if she got dialysis prior to being sent to the ED.  Plan for dialysis on 4/1/22      Anemia due to end stage renal disease  Hemoglobin nearly at transfusion threshold, but patient not able to consent.  Will need to reach surrogate decision maker in the morning to consent for blood if transfusion is indicated.   Her BL has wandered from 6-9  Suspect acute infection contributing to anemia  Continue to monitor closely      Type 2 diabetes mellitus with diabetic nephropathy, with long-term current use of insulin  Per nursing home MAR, appears to only be on sliding scale insulin.  Last hemoglobin A1c 5.9.  Renal diet ordered; she will not likely need supplemental insulin due to decreased renal clearance in ESRD.        VTE Risk Mitigation (From admission, onward)         Ordered     heparin (porcine) injection 1,000 Units  As needed (PRN)         03/29/22 0903     apixaban tablet 2.5 mg  2 times daily         03/29/22 0311     Reason for No Pharmacological VTE Prophylaxis  Once        Question:  Reasons:  Answer:  Already adequately anticoagulated on oral Anticoagulants     03/29/22 0311     IP VTE HIGH RISK PATIENT  Once         03/29/22 0311     Place sequential compression device  Until discontinued         03/29/22 0311                Discharge Planning   NGA: 4/2/2022     Code Status: Full Code   Is the patient medically ready for discharge?: No    Reason for patient still in hospital (select all that apply): Patient trending condition and Treatment  Discharge Plan A: Return to nursing home          Discussed patient's plan of care with Dr. Oumou Nix PA-C  Department of Hospital Medicine   [facility] - Telemetry Stepdown ([place])

## 2022-04-01 NOTE — PLAN OF CARE
Problem: Adult Inpatient Plan of Care  Goal: Plan of Care Review  Outcome: Ongoing, Progressing  Goal: Patient-Specific Goal (Individualized)  Outcome: Ongoing, Progressing  Goal: Absence of Hospital-Acquired Illness or Injury  Outcome: Ongoing, Progressing  Goal: Optimal Comfort and Wellbeing  Outcome: Ongoing, Progressing  Goal: Readiness for Transition of Care  Outcome: Ongoing, Progressing     Problem: Diabetes Comorbidity  Goal: Blood Glucose Level Within Targeted Range  Outcome: Ongoing, Progressing     Problem: Impaired Wound Healing  Goal: Optimal Wound Healing  Outcome: Ongoing, Progressing     Problem: Skin Injury Risk Increased  Goal: Skin Health and Integrity  Outcome: Ongoing, Progressing     Problem: Device-Related Complication Risk (Hemodialysis)  Goal: Safe, Effective Therapy Delivery  Outcome: Ongoing, Progressing     Problem: Hemodynamic Instability (Hemodialysis)  Goal: Effective Tissue Perfusion  Outcome: Ongoing, Progressing     Problem: Infection (Hemodialysis)  Goal: Absence of Infection Signs and Symptoms  Outcome: Ongoing, Progressing     Problem: Electrolyte Imbalance (Chronic Kidney Disease)  Goal: Electrolyte Balance  Outcome: Ongoing, Progressing     Problem: Fluid Volume Excess (Chronic Kidney Disease)  Goal: Fluid Balance  Outcome: Ongoing, Progressing     Problem: Pain Acute  Goal: Acceptable Pain Control and Functional Ability  Outcome: Ongoing, Progressing

## 2022-04-01 NOTE — ASSESSMENT & PLAN NOTE
Podiatry consulted for further evaluation - amputation of left big toe successfully performed on 3/31/22  - X-ray of the foot not suggestive of osteomyelitis  - Podiatry consulted and MRI ordered which does show evidence of osteomyelitis  - cultures taken at bedside, growing staph  - continuing IV Vanc and Zosyn  - ID consulted, recommend d/c vanc and continue cefazolin 1g for 24-48 hours post-amputation  - pain medication prn

## 2022-04-01 NOTE — ANESTHESIA POSTPROCEDURE EVALUATION
Anesthesia Post Evaluation    Patient: Kavya Figueroa    Procedure(s) Performed: Procedure(s) (LRB):  AMPUTATION, TOE LEFT (Left)    Final Anesthesia Type: general      Patient location during evaluation: PACU  Patient participation: Yes- Able to Participate  Level of consciousness: awake and alert  Post-procedure vital signs: reviewed and stable  Pain management: adequate  Airway patency: patent    PONV status at discharge: No PONV  Anesthetic complications: no      Cardiovascular status: blood pressure returned to baseline  Respiratory status: unassisted  Hydration status: euvolemic  Follow-up not needed.          Vitals Value Taken Time   /72 04/01/22 0517   Temp 37.1 °C (98.7 °F) 04/01/22 0400   Pulse 75 04/01/22 0603   Resp 9 04/01/22 0603   SpO2 100 % 04/01/22 0603   Vitals shown include unvalidated device data.      Event Time   Out of Recovery 03/31/2022 14:00:00         Pain/Helga Score: Pain Rating Prior to Med Admin: 7 (4/1/2022  1:06 AM)  Pain Rating Post Med Admin: 0 (3/31/2022  1:00 PM)  Helga Score: 10 (3/31/2022  1:30 PM)

## 2022-04-01 NOTE — PROGRESS NOTES
[facility] - Telemetry Stepdown ([place] [address])  Podiatry  Progress Note    Patient Name: Kavya Figueroa  MRN: [id number]  Admission Date: 3/28/2022  Hospital Length of Stay: 2 days  Attending Physician: Abram Coello MD  Primary Care Provider: Neeru Benites MD     Subjective:     Interval History:  S/p left great toe amputation on 3/31/2022. NAEON. Remains AFVSS. No leukocytosis. Intraop proximal bone margins with no growth to date thus far. Patient denies any foot pain. Previous wound cx +MSSA    Follow-up For: Procedure(s) (LRB):  AMPUTATION, TOE LEFT (Left)    Post-Operative Day: 1 Day Post-Op    Scheduled Meds:   sodium chloride 0.9%   Intravenous Once    apixaban  2.5 mg Oral BID    aspirin  81 mg Oral Daily    atorvastatin  40 mg Oral Daily    calcium acetate(phosphat bind)  667 mg Oral TID WM    clopidogreL  75 mg Oral Daily    cyanocobalamin  250 mcg Oral Daily    epoetin aston-epbx  50 Units/kg Intravenous Every Mon, Wed, Fri    famotidine  20 mg Oral Daily    LIDOcaine HCL 10 mg/ml (1%)  10 mL Other Once    metoprolol tartrate  25 mg Oral Daily    mupirocin   Nasal BID     Continuous Infusions:  PRN Meds:sodium chloride 0.9%, acetaminophen, dextrose 10%, dextrose 10%, gelatin adsorbable 12-7 mm top sponge, glucagon (human recombinant), glucose, glucose, heparin (porcine), insulin aspart U-100, melatonin, morphine, ondansetron, polyethylene glycol, sodium chloride 0.9%, sodium chloride 0.9%, sodium chloride 0.9%    Review of Systems   Unable to perform ROS: Dementia   Constitutional:  Negative for fever.   Musculoskeletal:  Positive for gait problem. Negative for arthralgias and myalgias.   Skin:  Positive for wound. Negative for color change.   Psychiatric/Behavioral:  Positive for confusion.    Objective:     Vital Signs (Most Recent):  Temp: 98.7 °F (37.1 °C) (04/01/22 0700)  Pulse: 75 (04/01/22 0515)  Resp: 13 (04/01/22 0515)  BP: (!) 142/64 (04/01/22 0700)  SpO2: 99 % (04/01/22  0515)   Vital Signs (24h Range):  Temp:  [96.5 °F (35.8 °C)-98.7 °F (37.1 °C)] 98.7 °F (37.1 °C)  Pulse:  [62-86] 75  Resp:  [8-22] 13  SpO2:  [96 %-100 %] 99 %  BP: ()/(47-99) 142/64     Weight: 69.2 kg (152 lb 8.9 oz)  Body mass index is 24.62 kg/m².    Foot Exam    Right Foot/Ankle     Inspection and Palpation  Tenderness: none   Swelling: none   Skin Exam: skin intact; no drainage, no cellulitis, no ulcer and no erythema     Neurovascular  Dorsalis pedis: 2+  Posterior tibial: 1+      Left Foot/Ankle      Inspection and Palpation  Tenderness: none   Swelling: none   Skin Exam: skin changes; no drainage, skin not intact, no maceration, no cellulitis and no erythema     Neurovascular  Dorsalis pedis: 2+  Posterior tibial: 1+        Laboratory:  CBC:   Recent Labs   Lab 04/01/22  0417   WBC 5.28   RBC 2.36*   HGB 6.9*   HCT 22.3*      MCV 95   MCH 29.2   MCHC 30.9*     CRP:   Recent Labs   Lab 03/28/22  2152   .3*     ESR:   Recent Labs   Lab 03/28/22  2152   SEDRATE 76*     Wound Cultures:   Recent Labs   Lab 03/29/22  1217 03/31/22  1309   LABAERO STAPHYLOCOCCUS AUREUS  Moderate  Skin mojgan also present  * No growth  No growth     Microbiology Results (last 7 days)       Procedure Component Value Units Date/Time    Aerobic culture [[id number]] Collected: 03/31/22 1309    Order Status: Completed Specimen: Toe, Left Foot Updated: 04/01/22 0955     Aerobic Bacterial Culture No growth      No growth    Culture, Anaerobe [[id number]] Collected: 03/31/22 1309    Order Status: Completed Specimen: Toe, Left Foot Updated: 04/01/22 0839     Anaerobic Culture Culture in progress    Gram stain [[id number]] Collected: 03/31/22 1315    Order Status: Completed Specimen: Toe, Left Foot Updated: 03/31/22 1930     Gram Stain Result No WBC's      No organisms seen    Blood culture [[id number]] Collected: 03/30/22 1515    Order Status: Completed Specimen: Blood Updated: 03/31/22 1612     Blood Culture, Routine No  Growth to date      No Growth to date    Narrative:      Collection has been rescheduled by [facility] at 03/30/2022 12:04 Reason:   Patient unavailable, in dialysis, nurse annelise notified.  Collection has been rescheduled by T at 03/30/2022 14:01 Reason:   Patient unavailable, dialysis said that patient is off machine and   waiting on transport to bring back up, nurse annelise notified   Collection has been rescheduled by [facility] at 03/30/2022 12:04 Reason:   Patient unavailable, in dialysis, nurse annelise notified.  Collection has been rescheduled by [facility] at 03/30/2022 14:01 Reason:   Patient unavailable, dialysis said that patient is off machine and   waiting on transport to bring back up, nurse annelise notified     Blood culture [[id number]] Collected: 03/30/22 1514    Order Status: Completed Specimen: Blood Updated: 03/31/22 1612     Blood Culture, Routine No Growth to date      No Growth to date    Narrative:      Collection has been rescheduled by [facility] at 03/30/2022 12:04 Reason:   Patient unavailable, in dialysis, nurse annelise notified.  Collection has been rescheduled by [facility] at 03/30/2022 14:01 Reason:   Patient unavailable, dialysis said that patient is off machine and   waiting on transport to bring back up, nurse annelise notified   Collection has been rescheduled by [facility] at 03/30/2022 12:04 Reason:   Patient unavailable, in dialysis, nurse annelise notified.  Collection has been rescheduled by [facility] at 03/30/2022 14:01 Reason:   Patient unavailable, dialysis said that patient is off machine and   waiting on transport to bring back up, nurse annelise notified     Fungus culture [[id number]] Collected: 03/31/22 1309    Order Status: Sent Specimen: Toe, Left Foot Updated: 03/31/22 1400    Blood culture [[id number]]     Order Status: Canceled Specimen: Blood     Culture, Respiratory with Gram Stain [[id number]]     Order Status: Canceled Specimen: Respiratory     Culture, Anaerobe [[id number]] Collected: 03/29/22 1217    Order Status: Completed  Specimen: Wound from Toe, Left Foot Updated: 03/31/22 1308     Anaerobic Culture Culture in progress    Aerobic culture [[id number]]  (Abnormal)  (Susceptibility) Collected: 03/29/22 1217    Order Status: Completed Specimen: Wound from Toe, Left Foot Updated: 03/31/22 1146     Aerobic Bacterial Culture STAPHYLOCOCCUS AUREUS  Moderate  Skin mojgan also present      Gram stain [[id number]] Collected: 03/29/22 1217    Order Status: Completed Specimen: Wound from Toe, Left Foot Updated: 03/29/22 1406     Gram Stain Result Rare WBC's      Moderate Gram positive cocci          Specimen (24h ago, onward)                 Start     Ordered    03/31/22 1335  Specimen to Pathology, Surgery Other (podiatry)  Once        Comments: Pre-op Diagnosis: Osteomyelitis of great toe of left foot [M86.9]    Procedure(s):  AMPUTATION, TOE LEFT     Number of specimens: 2      Name of specimens: 1) Left Great Toe- Permanent  2) Clean margin first metatarsal- Permanent   References:    Click here for ordering Quick Tip   Question Answer Comment   Procedure Type: Other podiatry   Release to patient Immediate        03/31/22 1340    03/31/22 1303  Specimen to Pathology, Surgery Other (Podiatry)  Once        Comments: Pre-op Diagnosis: Osteomyelitis of great toe of left foot [M86.9]    Procedure(s):  AMPUTATION, TOE LEFT     Number of specimens: 1    Name of specimens: Left Great Toe- Permanent   References:    Click here for ordering Quick Tip   Question Answer Comment   Procedure Type: Other Podiatry   Specimen Class: Routine/Screening    Release to patient Immediate        03/31/22 1305                    Diagnostic Results:  I have reviewed all pertinent imaging results/findings within the past 24 hours.    Clinical Findings:  S/p L great toe amputation on 3/31/2022. Sutures intact, no skin dehiscence or gapping, no necrosis or hematoma formation. No erythema or edema. Periwound skin viable.         Assessment/Plan:     * Acute hematogenous  osteomyelitis of left foot  MRI left foot demonstrating osteomyelitis of the left distal phalanx left hallux. Now s/p left great toe amputation on 3/31/2022    Plan:  Intraop bone margins with no growth to date  IV Ancef for 24-48 hours post op for SSTI  Ok to discharge patient from podiatry standpoint  Patient nonambulatory at baseline  Continue local wound care in the interim with betadine. Nursing orders placed for wound care.   Podiatry will continue to follow    DC Instructions:  Patient is to follow up with podiatry within 14 days of discharge with Dr. Mckeon. Podiatry will arrange an appointment.  Facility to do dressing changes every MWF and prn as follows:  Cleanse left toe amputation site with saline or wound cleanser, paint incision site with betadine, cover with xeroform, 4x4 gauze, kerlix, secure with tape.             Kary Arteaga DPM  [facility]  Podiatry PGY3  Pager: [phone]  Spectra:[phone]

## 2022-04-01 NOTE — NURSING
Patient being discharged in stable condition.  No significant events occurred during this shift.  Safety measures  Maintained.

## 2022-04-01 NOTE — PROGRESS NOTES
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    B12 deficiency 5/10/2021    Chronic diastolic congestive heart failure 12/18/2019    Dyslipidemia 3/31/2019    ESRD on hemodialysis 5/7/2021    Essential hypertension 3/31/2019    GERD (gastroesophageal reflux disease) 7/23/2021    History of myocardial infarction 7/23/2021    Secondary hyperparathyroidism of renal origin 3/31/2019    Type 2 diabetes mellitus, without long-term current use of insulin 3/31/2019       Social History     Socioeconomic History    Marital status: Single   Tobacco Use    Smoking status: Never Smoker    Smokeless tobacco: Never Used   Substance and Sexual Activity    Alcohol use: Never    Drug use: Never    Sexual activity: Not Currently       Precautions:     Allergies as of 03/28/2022    (No Known Allergies)       [facility] Assessment Details/Treatment   Wound care consulted for sacrum  The sacrum/buttocks have pink scar tissue- intact-skin is moist-  IAD.     Plan:  Sacrum/buttocks-  Nursing to cleanse perineal and affected area with perineal cleanser . Pat dry. Apply thin layer of Triad ointment BID and prn cleansing of incontinent episode. Do not apply cover dressing.     Nursing to continue care, pressure prevention measures  Wound care to follow-up prn    Recommendations made to primary team for above plan per secure chat . Orders placed.      04/01/22 0900        Wound 03/29/22 0535 Other (comment) Sacral Spine   Date First Assessed/Time First Assessed: 03/29/22 0535   Primary Wound Type: Other (comment)  Location: Sacral Spine   Wound Image    Wound WDL ex   Dressing Appearance Dry;Intact;Clean   Drainage Amount Scant   Drainage Characteristics/Odor Clear   Appearance Intact;Pink;Moist   Tissue loss  description Not applicable   Periwound Area Intact;Moist;Pink;Scar tissue   Care Cleansed with:;Soap and water;Skin Barrier       04/01/2022

## 2022-04-01 NOTE — SUBJECTIVE & OBJECTIVE
Subjective:     Interval History:  S/p left great toe amputation on 3/31/2022. NAEON. Remains AFVSS. No leukocytosis. Intraop proximal bone margins with no growth to date thus far. Patient denies any foot pain. Previous wound cx +MSSA    Follow-up For: Procedure(s) (LRB):  AMPUTATION, TOE LEFT (Left)    Post-Operative Day: 1 Day Post-Op    Scheduled Meds:   sodium chloride 0.9%   Intravenous Once    apixaban  2.5 mg Oral BID    aspirin  81 mg Oral Daily    atorvastatin  40 mg Oral Daily    calcium acetate(phosphat bind)  667 mg Oral TID WM    clopidogreL  75 mg Oral Daily    cyanocobalamin  250 mcg Oral Daily    epoetin aston-epbx  50 Units/kg Intravenous Every Mon, Wed, Fri    famotidine  20 mg Oral Daily    LIDOcaine HCL 10 mg/ml (1%)  10 mL Other Once    metoprolol tartrate  25 mg Oral Daily    mupirocin   Nasal BID     Continuous Infusions:  PRN Meds:sodium chloride 0.9%, acetaminophen, dextrose 10%, dextrose 10%, gelatin adsorbable 12-7 mm top sponge, glucagon (human recombinant), glucose, glucose, heparin (porcine), insulin aspart U-100, melatonin, morphine, ondansetron, polyethylene glycol, sodium chloride 0.9%, sodium chloride 0.9%, sodium chloride 0.9%    Review of Systems   Unable to perform ROS: Dementia   Constitutional:  Negative for fever.   Musculoskeletal:  Positive for gait problem. Negative for arthralgias and myalgias.   Skin:  Positive for wound. Negative for color change.   Psychiatric/Behavioral:  Positive for confusion.    Objective:     Vital Signs (Most Recent):  Temp: 98.7 °F (37.1 °C) (04/01/22 0700)  Pulse: 75 (04/01/22 0515)  Resp: 13 (04/01/22 0515)  BP: (!) 142/64 (04/01/22 0700)  SpO2: 99 % (04/01/22 0515)   Vital Signs (24h Range):  Temp:  [96.5 °F (35.8 °C)-98.7 °F (37.1 °C)] 98.7 °F (37.1 °C)  Pulse:  [62-86] 75  Resp:  [8-22] 13  SpO2:  [96 %-100 %] 99 %  BP: ()/(47-99) 142/64     Weight: 69.2 kg (152 lb 8.9 oz)  Body mass index is 24.62 kg/m².    Foot Exam    Right  Foot/Ankle     Inspection and Palpation  Tenderness: none   Swelling: none   Skin Exam: skin intact; no drainage, no cellulitis, no ulcer and no erythema     Neurovascular  Dorsalis pedis: 2+  Posterior tibial: 1+      Left Foot/Ankle      Inspection and Palpation  Tenderness: none   Swelling: none   Skin Exam: skin changes; no drainage, skin not intact, no maceration, no cellulitis and no erythema     Neurovascular  Dorsalis pedis: 2+  Posterior tibial: 1+        Laboratory:  CBC:   Recent Labs   Lab 04/01/22  0417   WBC 5.28   RBC 2.36*   HGB 6.9*   HCT 22.3*      MCV 95   MCH 29.2   MCHC 30.9*     CRP:   Recent Labs   Lab 03/28/22 2152   .3*     ESR:   Recent Labs   Lab 03/28/22 2152   SEDRATE 76*     Wound Cultures:   Recent Labs   Lab 03/29/22  1217 03/31/22  1309   LABAERO STAPHYLOCOCCUS AUREUS  Moderate  Skin mojgan also present  * No growth  No growth     Microbiology Results (last 7 days)       Procedure Component Value Units Date/Time    Aerobic culture [[id number]] Collected: 03/31/22 1309    Order Status: Completed Specimen: Toe, Left Foot Updated: 04/01/22 0955     Aerobic Bacterial Culture No growth      No growth    Culture, Anaerobe [[id number]] Collected: 03/31/22 1309    Order Status: Completed Specimen: Toe, Left Foot Updated: 04/01/22 0839     Anaerobic Culture Culture in progress    Gram stain [[id number]] Collected: 03/31/22 1315    Order Status: Completed Specimen: Toe, Left Foot Updated: 03/31/22 1930     Gram Stain Result No WBC's      No organisms seen    Blood culture [[id number]] Collected: 03/30/22 1515    Order Status: Completed Specimen: Blood Updated: 03/31/22 1612     Blood Culture, Routine No Growth to date      No Growth to date    Narrative:      Collection has been rescheduled by T at 03/30/2022 12:04 Reason:   Patient unavailable, in dialysis, nurse annelise notified.  Collection has been rescheduled by T at 03/30/2022 14:01 Reason:   Patient unavailable, dialysis  said that patient is off machine and   waiting on transport to bring back up, nurse annelise notified   Collection has been rescheduled by [facility] at 03/30/2022 12:04 Reason:   Patient unavailable, in dialysis, nurse annelise notified.  Collection has been rescheduled by T at 03/30/2022 14:01 Reason:   Patient unavailable, dialysis said that patient is off machine and   waiting on transport to bring back up, nurse annelise notified     Blood culture [[id number]] Collected: 03/30/22 1514    Order Status: Completed Specimen: Blood Updated: 03/31/22 1612     Blood Culture, Routine No Growth to date      No Growth to date    Narrative:      Collection has been rescheduled by [facility] at 03/30/2022 12:04 Reason:   Patient unavailable, in dialysis, nurse annelise notified.  Collection has been rescheduled by [facility] at 03/30/2022 14:01 Reason:   Patient unavailable, dialysis said that patient is off machine and   waiting on transport to bring back up, nurse annelise notified   Collection has been rescheduled by [facility] at 03/30/2022 12:04 Reason:   Patient unavailable, in dialysis, nurse annelise notified.  Collection has been rescheduled by [facility] at 03/30/2022 14:01 Reason:   Patient unavailable, dialysis said that patient is off machine and   waiting on transport to bring back up, nurse annelise notified     Fungus culture [[id number]] Collected: 03/31/22 1309    Order Status: Sent Specimen: Toe, Left Foot Updated: 03/31/22 1400    Blood culture [[id number]]     Order Status: Canceled Specimen: Blood     Culture, Respiratory with Gram Stain [[id number]]     Order Status: Canceled Specimen: Respiratory     Culture, Anaerobe [[id number]] Collected: 03/29/22 1217    Order Status: Completed Specimen: Wound from Toe, Left Foot Updated: 03/31/22 1308     Anaerobic Culture Culture in progress    Aerobic culture [[id number]]  (Abnormal)  (Susceptibility) Collected: 03/29/22 1217    Order Status: Completed Specimen: Wound from Toe, Left Foot Updated: 03/31/22 1146     Aerobic  Bacterial Culture STAPHYLOCOCCUS AUREUS  Moderate  Skin mojgan also present      Gram stain [[id number]] Collected: 03/29/22 1217    Order Status: Completed Specimen: Wound from Toe, Left Foot Updated: 03/29/22 1406     Gram Stain Result Rare WBC's      Moderate Gram positive cocci          Specimen (24h ago, onward)                 Start     Ordered    03/31/22 1335  Specimen to Pathology, Surgery Other (podiatry)  Once        Comments: Pre-op Diagnosis: Osteomyelitis of great toe of left foot [M86.9]    Procedure(s):  AMPUTATION, TOE LEFT     Number of specimens: 2      Name of specimens: 1) Left Great Toe- Permanent  2) Clean margin first metatarsal- Permanent   References:    Click here for ordering Quick Tip   Question Answer Comment   Procedure Type: Other podiatry   Release to patient Immediate        03/31/22 1340    03/31/22 1303  Specimen to Pathology, Surgery Other (Podiatry)  Once        Comments: Pre-op Diagnosis: Osteomyelitis of great toe of left foot [M86.9]    Procedure(s):  AMPUTATION, TOE LEFT     Number of specimens: 1    Name of specimens: Left Great Toe- Permanent   References:    Click here for ordering Quick Tip   Question Answer Comment   Procedure Type: Other Podiatry   Specimen Class: Routine/Screening    Release to patient Immediate        03/31/22 1305                    Diagnostic Results:  I have reviewed all pertinent imaging results/findings within the past 24 hours.    Clinical Findings:  S/p L great toe amputation on 3/31/2022. Sutures intact, no skin dehiscence or gapping, no necrosis or hematoma formation. No erythema or edema. Periwound skin viable.

## 2022-04-01 NOTE — PROGRESS NOTES
3.5hr HD treatment completed 2L of fluid removed pt tolerated well. Epoetin given as ordered. Both needles of a CATARINA fistula removed and pressure held until hemostasis achieved dressing applied. Report given to DONTE Ravi.

## 2022-04-01 NOTE — NURSING
Patient oriented to self.  Hard to assess orientation/speech is hard to understand.Pills were crushed for patient this morning.

## 2022-04-01 NOTE — PROGRESS NOTES
[facility] HEMODIALYSIS NOTE     Patient currently on hemodialysis for removal of uremic toxins and volume.     Patient seen and evaluated on hemodialysis, tolerating treatment, see HD flowsheet for vitals and assessments.    Labs have been reviewed and the dialysate bath has been adjusted.       Assessment/Plan:    -Patient seen on HD, tolerating treatment well, w/o complaints   -UF goal of 1-2 as tolerated, keep map >65  -Renal diet, if not NPO   -Strict I/O's and daily weights  -Daily renal function panels  -Keep MAP >65 while on HD   -Maintain Hgb >7.0  -continue epo   -Will continue to follow while inpatient     Arianna Garcias DNP-FNP, C  Nephrology  Pager: [phone]

## 2022-04-01 NOTE — PROGRESS NOTES
Report received from DONTE Ravi. Pt arrived from floor via bed. Maintenance dialysis initiated via CATARINA fistula with 15g needles BFR@400.

## 2022-04-01 NOTE — SUBJECTIVE & OBJECTIVE
Interval History: Patient seen and examined today. Hypertensive overnight, will continue to monitor closely after dialysis. Patient appears uncomfortable and states that her left foot is bothering her. Continue prn pain medication. Continue cefazolin and plan to discharge back to NH tomorrow.     Review of Systems   Unable to perform ROS: Dementia   Constitutional:  Negative for fever.   Musculoskeletal:  Positive for gait problem.   Skin:  Positive for wound.   Objective:     Vital Signs (Most Recent):  Temp: 98.1 °F (36.7 °C) (04/01/22 1320)  Pulse: 79 (04/01/22 1415)  Resp: 17 (04/01/22 1320)  BP: (!) 159/51 (04/01/22 1415)  SpO2: 99 % (04/01/22 0515)   Vital Signs (24h Range):  Temp:  [96.5 °F (35.8 °C)-98.7 °F (37.1 °C)] 98.1 °F (36.7 °C)  Pulse:  [67-86] 79  Resp:  [8-22] 17  SpO2:  [96 %-100 %] 99 %  BP: (140-166)/(51-99) 159/51     Weight: 69.2 kg (152 lb 8.9 oz)  Body mass index is 24.62 kg/m².  No intake or output data in the 24 hours ending 04/01/22 1434   Physical Exam  Vitals and nursing note reviewed.   Constitutional:       General: She is sleeping. She is not in acute distress.     Appearance: She is normal weight.   HENT:      Head: Normocephalic and atraumatic.   Cardiovascular:      Rate and Rhythm: Tachycardia present.      Heart sounds: Murmur heard.   Pulmonary:      Effort: Pulmonary effort is normal.      Breath sounds: Normal breath sounds.   Abdominal:      General: Abdomen is flat. There is no distension.      Palpations: Abdomen is soft.      Tenderness: There is no abdominal tenderness.   Musculoskeletal:         General: Swelling (left great toe) present. No tenderness or signs of injury. Normal range of motion.      Cervical back: Normal range of motion and neck supple. No rigidity.   Feet:      Comments: All toenails are severely ingrown and dysmorphic and have not been trimmed in a very long time.  There is a small wound at the lateral nail bed of the left great toe with purulence.   The left great toe is notably swollen and erythematous.  Lymphadenopathy:      Cervical: No cervical adenopathy.   Skin:     General: Skin is warm and dry.      Findings: Erythema (left great toe) present.      Comments: Very dry, flaky skin on feet   Neurological:      Mental Status: She is easily aroused. She is disoriented.      Cranial Nerves: No cranial nerve deficit.      Comments: Knows her name and , knows shes at [facility]       Significant Labs: All pertinent labs within the past 24 hours have been reviewed.  BMP:   Recent Labs   Lab 22  0417   *      K 4.7      CO2 25   BUN 25*   CREATININE 5.8*   CALCIUM 9.6   MG 2.0     CBC:   Recent Labs   Lab 22  0357 227   WBC 5.25 5.28   HGB 6.7* 6.9*   HCT 22.1* 22.3*    164       Significant Imaging: I have reviewed all pertinent imaging results/findings within the past 24 hours.

## 2022-04-02 NOTE — PLAN OF CARE
[facility] - Telemetry Stepdown ([facility])  Discharge Final Note    Primary Care Provider: Neeru Benites MD    Expected Discharge Date: 4/2/2022    Final Discharge Note (most recent)       Final Note - 04/02/22 1020          Final Note    Assessment Type Final Discharge Note (P)      Anticipated Discharge Disposition senior living Nursing Home (P)    [place] Resident       Post-Acute Status    Post-Acute Authorization Placement (P)      Post-Acute Placement Status Set-up Complete/Auth obtained (P)      Discharge Delays None known at this time (P)    Transport set for 11:15 AM (This is an est time). Nurse can call report to                     Important Message from Medicare  Important Message from Medicare regarding Discharge Appeal Rights: Other (comments) (Patient unable to sign, unable to reach daughter)     Date IMM was signed: 04/01/22  Time IMM was signed: 6617    Elizabeth James LMSW  Case Management Social Worker   [facility] [address]

## 2022-04-02 NOTE — PLAN OF CARE
NURSING HOME ORDERS    04/02/2022  [facility]  [facility] - TELEMETRY STEPDOWN ([facility])  [address]  [facility] [address]  Dept: [phone]  Loc: [phone]     Admit to Nursing Home:  Long Term Acute Care    Diagnoses:  Active Hospital Problems    Diagnosis  POA    *Acute hematogenous osteomyelitis of left foot [M86.072]  Yes    ESRD on hemodialysis [N18.6, Z99.2]  Not Applicable     Chronic    Type 2 diabetes mellitus with diabetic nephropathy, with long-term current use of insulin [E11.21, Z79.4]  Not Applicable    Anemia due to end stage renal disease [N18.6, D63.1]  Yes     Chronic      Resolved Hospital Problems   No resolved problems to display.       Code Status: FULL    Patient is homebound due to:  Acute hematogenous osteomyelitis of left foot    Allergies:Review of patient's allergies indicates:  No Known Allergies    Vitals:  Routine    Diet: diabetic diet: 2000 calorie and renal diet 1500mL fluids    Activities:   Activity as tolerated    Labs:  Per facility    Nursing Precautions:  Aspiration , Fall and Pressure ulcer prevention    Consults:   Wound Care     Miscellaneous Care: Routine Skin for Bedridden Patients:  Apply moisture barrier cream to all  Wound Care:   DC Instructions:  Patient is to follow up with podiatry within 14 days of discharge with Dr. Mckeon. Podiatry will arrange an appointment.  Facility to do dressing changes every MWF and prn as follows:  Cleanse left toe amputation site with saline or wound cleanser, paint incision site with betadine, cover with xeroform, 4x4 gauze, kerlix, secure with tape  Sacrum/buttocks-  Nursing to cleanse perineal and affected area with perineal cleanser . Pat dry. Apply thin layer of Triad ointment BID and prn cleansing of incontinent episode. Do not apply cover dressing.                    Diabetes Care:  Fingerstick blood sugar AC and HS and Report CBG < 60 or > 350 to physician.      Medications:       Medication List      CONTINUE taking these medications    acetaminophen 325 MG tablet  Commonly known as: TYLENOL  Take 2 tablets (650 mg total) by mouth every 6 (six) hours as needed (for pain scale 1-4 or fever over 101).     apixaban 2.5 mg Tab  Commonly known as: ELIQUIS  Take 1 tablet (2.5 mg total) by mouth 2 (two) times daily.     aspirin 81 MG EC tablet  Commonly known as: ECOTRIN  Take 1 tablet (81 mg total) by mouth once daily.     atorvastatin 40 MG tablet  Commonly known as: LIPITOR  Take 1 tablet (40 mg total) by mouth once daily.     calcium acetate(phosphat bind) 667 mg tablet  Commonly known as: PHOSLO  Take 1 tablet (667 mg total) by mouth 3 (three) times daily with meals.     clopidogreL 75 mg tablet  Commonly known as: PLAVIX  Take 1 tablet (75 mg total) by mouth once daily.     cyanocobalamin 250 MCG tablet  Commonly known as: VITAMIN B-12  Take 1 tablet (250 mcg total) by mouth once daily.     famotidine 20 MG tablet  Commonly known as: PEPCID  Take 1 tablet (20 mg total) by mouth once daily.     metoprolol tartrate 25 MG tablet  Commonly known as: LOPRESSOR  Take 1 tablet (25 mg total) by mouth once daily.     polyethylene glycol 17 gram Pwpk  Commonly known as: GLYCOLAX  Take 17 g by mouth 2 (two) times daily as needed (Constipation).              Immunizations Administered as of 4/2/2022     No immunizations on file.          This patient has had both covid vaccinations    Some patients may experience side effects after vaccination.  These may include fever, headache, muscle or joint aches.  Most symptoms resolve with 24-48 hours and do not require urgent medical evaluation unless they persist for more than 72 hours or symptoms are concerning for an unrelated medical condition.          _________________________________  Shavonne Billy PA-C  04/02/2022

## 2022-04-02 NOTE — DISCHARGE SUMMARY
[facility] [address]  [facility] Medicine  Discharge Summary      Patient Name: Kavya Figueroa  MRN: [id number]  Patient Class: IP- Inpatient  Admission Date: 3/28/2022  Hospital Length of Stay: 3 days  Discharge Date and Time: 4/2/2022  2:31 PM  Attending Physician: No att. providers found   Discharging Provider: Shavonne Moore PA-C  Primary Care Provider: Neeru Benites MD  [facility] Medicine Team: [facility] F Shavonne Moore PA-C    HPI:   68-year-old woman with dementia sent to the ED from [facility] for infection of left great toe.  She is unable to provide much useful history due to dementia, so history largely obtained from ED notes and nursing home records.  Her left great toe was noted to have been swollen with an area of inflammation extending from the toenail bed so she was sent to the ED for evaluation.      Procedure(s) (LRB):  AMPUTATION, TOE LEFT (Left)      Hospital Course:   Patient admitted for osteomyelitis of her L great toe as confirmed on MRI. Started on IV Vanc and Zosyn. Podiatry consulted, cleaned at bedside, cultures growing staph. Podiatry considering bone biopsy vs amputation. Patient and family wish to proceed with amputation. Left big toe amputated successfully with clean margins by podiatry on 3/31/22. ID consulted and recommend to d/c vanc/zosyn and initiate cefazolin 1g daily for 24-48 hours post-amputation. Continue local wound care by podiatry. Stable for dc back to NH with updated wound care orders. FU with podiatry outpt.        Goals of Care Treatment Preferences:  Code Status: Full Code      Consults:   Consults (From admission, onward)        Status Ordering Provider     Inpatient consult to Infectious Diseases  Once        Provider:  (Not yet assigned)    Completed SHAVONNE MOORE     Inpatient consult to Nephrology  Once        Provider:  (Not yet assigned)    Completed GEORGIA HAY     Podiatry  Once         Provider:  (Not yet assigned)    Completed GEORGIA HAY          * Acute hematogenous osteomyelitis of left foot  Podiatry consulted for further evaluation - amputation of left big toe successfully performed on 3/31/22  - X-ray of the foot not suggestive of osteomyelitis  - Podiatry consulted and MRI ordered which does show evidence of osteomyelitis  - cultures taken at bedside, growing staph  - continuing IV Vanc and Zosyn  - ID consulted, recommend d/c vanc and continue cefazolin 1g for 24-48 hours post-amputation  - pain medication prn  Per podiaty: DC Instructions:  Patient is to follow up with podiatry within 14 days of discharge with Dr. Mckeon. Podiatry will arrange an appointment.  Facility to do dressing changes every MWF and prn as follows:  Cleanse left toe amputation site with saline or wound cleanser, paint incision site with betadine, cover with xeroform, 4x4 gauze, kerlix, secure with tape.      Stable for dc back to NH with updated wound care orders    ESRD on hemodialysis  Nephrology consulted for dialysis; unclear if she got dialysis prior to being sent to the ED.  Plan for dialysis on 4/1/22      Anemia due to end stage renal disease  Hemoglobin nearly at transfusion threshold, but patient not able to consent.  Will need to reach surrogate decision maker in the morning to consent for blood if transfusion is indicated.   Her BL has wandered from 6-9  Suspect acute infection contributing to anemia  Continue to monitor closely      Type 2 diabetes mellitus with diabetic nephropathy, with long-term current use of insulin  Per nursing home MAR, appears to only be on sliding scale insulin.  Last hemoglobin A1c 5.9.  Renal diet ordered; she will not likely need supplemental insulin due to decreased renal clearance in ESRD.        Final Active Diagnoses:    Diagnosis Date Noted POA    PRINCIPAL PROBLEM:  Acute hematogenous osteomyelitis of left foot [M86.072] 03/29/2022 Yes    ESRD on  hemodialysis [N18.6, Z99.2] 05/07/2021 Not Applicable     Chronic    Type 2 diabetes mellitus with diabetic nephropathy, with long-term current use of insulin [E11.21, Z79.4] 03/31/2019 Not Applicable    Anemia due to end stage renal disease [N18.6, D63.1] 03/31/2019 Yes     Chronic      Problems Resolved During this Admission:       Discharged Condition: stable    Disposition: Long Term Acute Care    Follow Up:    Patient Instructions:      Ambulatory referral/consult to Podiatry   Standing Status: Future   Referral Priority: Routine Referral Type: Consultation   Referral Reason: Specialty Services Required   Requested Specialty: Podiatry   Number of Visits Requested: 1     Notify your health care provider if you experience any of the following:  temperature >100.4     Notify your health care provider if you experience any of the following:  severe uncontrolled pain     Notify your health care provider if you experience any of the following:  redness, tenderness, or signs of infection (pain, swelling, redness, odor or green/yellow discharge around incision site)     Notify your health care provider if you experience any of the following:  worsening rash     Activity as tolerated       Significant Diagnostic Studies: Microbiology:   Blood Culture   Lab Results   Component Value Date    LABBLOO No Growth to date 03/30/2022    LABBLOO No Growth to date 03/30/2022    LABBLOO No Growth to date 03/30/2022     Radiology: MRI: suggestive of osteomyelitis of L great toe    Pending Diagnostic Studies:     Procedure Component Value Units Date/Time    Specimen to Pathology, Surgery Other (Podiatry) [[id number]] Collected: 03/31/22 1316    Order Status: Sent Lab Status: In process Updated: 03/31/22 1316    Specimen to Pathology, Surgery Other (podiatry) [[id number]] Collected: 03/31/22 1340    Order Status: Sent Lab Status: In process Updated: 03/31/22 2209    Urinalysis, Reflex to Urine Culture Urine, Clean Catch [[id number]]  Collected: 03/28/22 2159    Order Status: Sent Lab Status: In process Updated: 03/28/22 2200    Specimen: Urine          Medications:  Reconciled Home Medications:      Medication List      CONTINUE taking these medications    acetaminophen 325 MG tablet  Commonly known as: TYLENOL  Take 2 tablets (650 mg total) by mouth every 6 (six) hours as needed (for pain scale 1-4 or fever over 101).     apixaban 2.5 mg Tab  Commonly known as: ELIQUIS  Take 1 tablet (2.5 mg total) by mouth 2 (two) times daily.     aspirin 81 MG EC tablet  Commonly known as: ECOTRIN  Take 1 tablet (81 mg total) by mouth once daily.     atorvastatin 40 MG tablet  Commonly known as: LIPITOR  Take 1 tablet (40 mg total) by mouth once daily.     calcium acetate(phosphat bind) 667 mg tablet  Commonly known as: PHOSLO  Take 1 tablet (667 mg total) by mouth 3 (three) times daily with meals.     clopidogreL 75 mg tablet  Commonly known as: PLAVIX  Take 1 tablet (75 mg total) by mouth once daily.     cyanocobalamin 250 MCG tablet  Commonly known as: VITAMIN B-12  Take 1 tablet (250 mcg total) by mouth once daily.     famotidine 20 MG tablet  Commonly known as: PEPCID  Take 1 tablet (20 mg total) by mouth once daily.     metoprolol tartrate 25 MG tablet  Commonly known as: LOPRESSOR  Take 1 tablet (25 mg total) by mouth once daily.     polyethylene glycol 17 gram Pwpk  Commonly known as: GLYCOLAX  Take 17 g by mouth 2 (two) times daily as needed (Constipation).            Indwelling Lines/Drains at time of discharge:   Lines/Drains/Airways     Drain  Duration                Hemodialysis AV Fistula Left upper arm -- days    Female External Urinary Catheter 03/29/22 0539 4 days                Time spent on the discharge of patient: >45 minutes         Shavonne Billy PA-C  Department of Hospital Medicine  [facility] - Telemetry Stepdown ([place])

## 2022-04-02 NOTE — NURSING
Patient is awake and alert.  Unable to assess orientation.  Patients baseline has been flat the last 2 days.  She is lethargic and withdrawn.  Patient is being transferred to NH this morning.

## 2022-04-04 NOTE — TELEPHONE ENCOUNTER
Spoke with daughter, pt is a resident at [facility], she requests we notify them.  Phone number for [facility] added to demographic page.  Spoke khadar Gonzalez at [facility].

## 2022-04-13 NOTE — TELEPHONE ENCOUNTER
----- Message from Anca Petit sent at 4/13/2022  3:18 PM CDT -----  Contact: Patient daughter  Type:   Appointment Reschedule Request POST OP      Name of Caller:Patient daughter  Patient daughter stated patient in nursing home and nursing on aware of appointment on 04/12/2022  Would the patient rather a call back or a response via [facility]? Call back  Best Call Back Number:[facility] [phone]  Additional Information: Please assist

## 2022-04-13 NOTE — TELEPHONE ENCOUNTER
----- Message from Anca Petit sent at 4/13/2022  3:18 PM CDT -----  Contact: Patient daughter  Type:   Appointment Reschedule Request POST OP      Name of Caller:Patient daughter  Patient daughter stated patient in nursing home and nursing on aware of appointment on 04/12/2022  Would the patient rather a call back or a response via [facility]? Call back  Best Call Back Number:[facility] [phone]  Additional Information: Please assist

## 2022-04-13 NOTE — TELEPHONE ENCOUNTER
Spoke with Beatrice at [facility], patient missed post op appt due to dialysis on M, W, F.  Unable to transport her to [facility] tomorrow, appt made for Tuesday 4/19

## 2022-05-16 PROBLEM — I61.5 IVH (INTRAVENTRICULAR HEMORRHAGE): Status: ACTIVE | Noted: 2022-01-01

## 2022-05-16 NOTE — ASSESSMENT & PLAN NOTE
Kavya Figueroa is a 76 y.o. female with ESRD on dialysis, HTN, GERD, MI who presents with AMS found to have a large IVH with hydrocephalus ICH score 4. S/P mannitol with no improvement in exam. NSGY evaluated and discussed with family, no intervention.     After extensive discussion about the poor prognosis, especially in absence of interventions with daughter at bedside, and exploring what the patient would have wanted for herself in such situation, she requested DNR & avoid invasive/aggressive measures at this time, plan for transition to comfort as soon as rest of family arrives to see the patient.     Plan discussed with NSGY & ED teams    Will sign off. Please feel free to contact us with any questions

## 2022-05-16 NOTE — ED NOTES
0813- patient arrived by [facility] from [facility] for AMS/unresponsive.  LSN sometime last night per staff at [facility].  Staff was poor historian on patient's baseline and hx.  Dialysis patient.  Staff went to give meds this morning and patient was not responding.  Patient drooling and tachypneic upon arrival. No movement and not following commands. Stroke Code called by Dr. Quiñones in EMS hallway.  Taken to room 3 to be intubated prior to CT

## 2022-05-16 NOTE — SUBJECTIVE & OBJECTIVE
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    B12 deficiency 5/10/2021    Chronic diastolic congestive heart failure 12/18/2019    Dyslipidemia 3/31/2019    ESRD on hemodialysis 5/7/2021    Essential hypertension 3/31/2019    GERD (gastroesophageal reflux disease) 7/23/2021    History of myocardial infarction 7/23/2021    Secondary hyperparathyroidism of renal origin 3/31/2019    Type 2 diabetes mellitus, without long-term current use of insulin 3/31/2019     Past Surgical History:   Procedure Laterality Date    DECLOTTING OF VASCULAR GRAFT Left 5/26/2021    Procedure: DECLOT-GRAFT;  Surgeon: REYNALDO Watters II, MD;  Location: [facility] OR [address] [place] [address];  Service: Cardiovascular;  Laterality: Left;  10.5 min  59.97 mGy  9.4910 Gy.cm  20ml Dye    FISTULOTOMY      TOE AMPUTATION Left 3/31/2022    Procedure: AMPUTATION, TOE LEFT;  Surgeon: Yennifer Matias DPM;  Location: [facility] OR [address] [place] [address];  Service: Podiatry;  Laterality: Left;    VENOPLASTY Left 5/26/2021    Procedure: ANGIOPLASTY, VEIN;  Surgeon: REYNALDO Watters II, MD;  Location: [facility] OR [address] [place] [address];  Service: Cardiovascular;  Laterality: Left;  balloon angioplasty     No family history on file.  Social History     Tobacco Use    Smoking status: Never Smoker    Smokeless tobacco: Never Used   Substance Use Topics    Alcohol use: Never    Drug use: Never     Review of patient's allergies indicates:  No Known Allergies    Medications: I have reviewed the current medication administration record.    (Not in a hospital admission)      Review of Systems   Unable to perform ROS: Acuity of condition   Objective:     Vital Signs (Most Recent):  Temp: (!) 101.3 °F (38.5 °C) (05/16/22 1232)  Pulse: 92 (05/16/22 1232)  Resp: 20 (05/16/22 0855)  BP: (!) 98/50 (05/16/22 1232)  SpO2: 100 % (05/16/22 1232)    Vital Signs Range (Last 24H):  Temp:  [101.1 °F (38.4 °C)-104.4 °F (40.2 °C)]   Pulse:  []   Resp:  [20]   BP: ()/(49-92)   SpO2:  [96  %-100 %]     Physical Exam  Constitutional:       Appearance: She is toxic-appearing.   HENT:      Head: Normocephalic and atraumatic.   Eyes:      Comments: 5 mm and non-reactive (post sedation administration)   Pulmonary:      Comments: Intubated  Abdominal:      General: Abdomen is flat.      Palpations: Abdomen is soft.       Neurological Exam:   LOC: comatose  Attention Span: Nonresponsive  Language: Mute  Facial Movement (CN VII): Symmetric facial expression    Gag Reflex: present  Motor: Arm left  Plegia 0/5  Leg left  Plegia 0/5  Arm right  Plegia 0/5  Leg right Plegia 0/5      Laboratory:  CMP:   Recent Labs   Lab 05/16/22  0828   CALCIUM 8.4*   ALBUMIN 2.8*   PROT 6.5      K 5.3*   CO2 22*      BUN 47*   CREATININE 7.9*   ALKPHOS 80   ALT <5*   AST 13   BILITOT 0.7     CBC:   Recent Labs   Lab 05/16/22  0828   WBC 6.10   RBC 4.22   HGB 12.2   HCT 39.2      MCV 93   MCH 28.9   MCHC 31.1*     Lipid Panel:   Recent Labs   Lab 05/16/22  0828   CHOL 148   LDLCALC 89.6   HDL 43   TRIG 77     Coagulation:   Recent Labs   Lab 05/16/22  0921   INR 1.4*     Hgb A1C: No results for input(s): HGBA1C in the last 168 hours.  TSH:   Recent Labs   Lab 05/16/22  0828   TSH 1.393       Diagnostic Results:      Brain imaging:  CTA MP 5/16/22  Impression:     Large hemorrhage with surrounding edema involving the left thalamus and midbrain with intraventricular extension as detailed above.  Largest volume of blood is centered in the 3rd ventricle measuring up to 3.3 cm in transverse dimension     Enlargement of the ventricular system with surrounding periventricular edema concerning for acute obstructive hydrocephalus.     Trace subarachnoid hemorrhage along the left medial parietal lobe.     Chronic microvascular ischemic change.     Endotracheal tube is visualized, with tip at the right mainstem bronchus origin.  Retraction of the endotracheal tube is recommended.     Trace right pleural effusion as well  as patchy heterogeneous opacities throughout both lungs with right upper lobe predominance.  Findings are nonspecific, may represent infection or non infectious inflammatory process, with aspiration also a consideration.     No aneurysm or vascular malformation.     Mixed calcific and atheromatous atherosclerosis involving the carotid bulbs with approximately 30% stenosis at the right carotid bifurcation and approximately 50 stenosis at the left carotid bifurcation.  Vessel Imaging:  See brain imaging    Cardiac Evaluation:   NA

## 2022-05-16 NOTE — PROGRESS NOTES
Pharmacokinetic Initial Assessment: IV Vancomycin    Assessment/Plan:    - Continuing vanc initiated in ED, received 1250 mg x 1 at  10:30  - Will dose by level in setting of ESRD  - Hold vanc today  - Draw random level tomorrow 5/17 with AM labs    Pharmacy will continue to follow and monitor vancomycin.    Please contact pharmacy at extension [phone] with any questions regarding this assessment.     Thank you for the consult,   Robyn Horton, PharmD, BCPS  Neurocritical Care Pharmacist  [id number]       Patient brief summary:  Kavya Figueroa is a 76 y.o. female initiated on antimicrobial therapy with IV Vancomycin for treatment of suspected bacteremia    Drug Allergies:   Review of patient's allergies indicates:  No Known Allergies    Actual Body Weight:   77.1 kg    Renal Function:   Estimated Creatinine Clearance: 6.2 mL/min (A) (based on SCr of 7.9 mg/dL (H)).,     Dialysis Method (if applicable):  intermittent HD    CBC (last 72 hours):  Recent Labs   Lab Result Units 05/16/22  0828   WBC K/uL 6.10   Hemoglobin g/dL 12.2   Hematocrit % 39.2   Platelets K/uL 226   Gran % % 80.9*   Lymph % % 6.2*   Mono % % 11.5   Eosinophil % % 0.0   Basophil % % 0.7   Differential Method  Automated       Metabolic Panel (last 72 hours):  Recent Labs   Lab Result Units 05/16/22  0828 05/16/22  1140   Sodium mmol/L 144  --    Potassium mmol/L 5.3*  --    Chloride mmol/L 102  --    CO2 mmol/L 22*  --    Glucose mg/dL 301*  --    Glucose, UA   --  1+*   BUN mg/dL 47*  --    Creatinine mg/dL 7.9*  --    Albumin g/dL 2.8*  --    Total Bilirubin mg/dL 0.7  --    Alkaline Phosphatase U/L 80  --    AST U/L 13  --    ALT U/L <5*  --        Drug levels (last 3 results):  No results for input(s): VANCOMYCINRA, VANCOMYCINPE, VANCOMYCINTR in the last 72 hours.    Microbiologic Results:  Microbiology Results (last 7 days)     Procedure Component Value Units Date/Time    Urine culture [[id number]] Collected: 05/16/22 1140    Order  Status: No result Specimen: Urine Updated: 05/16/22 1317    Blood culture x two cultures. Draw prior to antibiotics. [[id number]] Collected: 05/16/22 0921    Order Status: Sent Specimen: Blood from Line, Femoral, Right Updated: 05/16/22 0938    Blood culture x two cultures. Draw prior to antibiotics. [[id number]] Collected: 05/16/22 0921    Order Status: Sent Specimen: Blood from Line, Femoral, Right Updated: 05/16/22 0938

## 2022-05-16 NOTE — HPI
Kavya Figueroa is a 76 year old female with history of ESRD on dialysis, HTN, GERD, MI who presents by ems from the nursing home for AMS, found to have ICH/IVH. Patient nonresponsive unable to provide Hx, lives in nursing home, family at bedside.  Per charts: patient was last known normal last night at unknown time and this am when the patient was assessed by staff she was not responsive. On arrival to the ED the patient was unresponsive and code stroke was called. Patient was subsequently intubated for airway protection prior to imaging following discussion with the patients daughter who confirmed the patient was a full code and she did want the patient to be intubated. CTH with large ICH/IVH, poor exam.

## 2022-05-16 NOTE — HPI
76F nursing home resident h/o ESRD on iHD, HTN, GERD, MI, presented via EMS for AMS. LSN last night unknown time. Patient found this morning by nursing home staff encephalopathic. In ED, patient was unresponsive and code stroke was called. Patient was subsequently intubated for airway protection. Patient takes Eliquis per report.     CTH on presentation significant for large L thalamic ICH with IVH and acute HCP.

## 2022-05-16 NOTE — SUBJECTIVE & OBJECTIVE
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    B12 deficiency 5/10/2021    Chronic diastolic congestive heart failure 12/18/2019    Dyslipidemia 3/31/2019    ESRD on hemodialysis 5/7/2021    Essential hypertension 3/31/2019    GERD (gastroesophageal reflux disease) 7/23/2021    History of myocardial infarction 7/23/2021    Secondary hyperparathyroidism of renal origin 3/31/2019    Type 2 diabetes mellitus, without long-term current use of insulin 3/31/2019     Past Surgical History:   Procedure Laterality Date    DECLOTTING OF VASCULAR GRAFT Left 5/26/2021    Procedure: DECLOT-GRAFT;  Surgeon: REYNALDO Watters II, MD;  Location: [facility] OR [address] [place] [address];  Service: Cardiovascular;  Laterality: Left;  10.5 min  59.97 mGy  9.4910 Gy.cm  20ml Dye    FISTULOTOMY      TOE AMPUTATION Left 3/31/2022    Procedure: AMPUTATION, TOE LEFT;  Surgeon: Yennifer Matias DPM;  Location: [facility] OR [address] [place] [address];  Service: Podiatry;  Laterality: Left;    VENOPLASTY Left 5/26/2021    Procedure: ANGIOPLASTY, VEIN;  Surgeon: REYNALDO Watters II, MD;  Location: [facility] OR [address] [place] [address];  Service: Cardiovascular;  Laterality: Left;  balloon angioplasty      No current facility-administered medications on file prior to encounter.     Current Outpatient Medications on File Prior to Encounter   Medication Sig Dispense Refill    acetaminophen (TYLENOL) 325 MG tablet Take 2 tablets (650 mg total) by mouth every 6 (six) hours as needed (for pain scale 1-4 or fever over 101).  0    apixaban (ELIQUIS) 2.5 mg Tab Take 1 tablet (2.5 mg total) by mouth 2 (two) times daily.      aspirin (ECOTRIN) 81 MG EC tablet Take 1 tablet (81 mg total) by mouth once daily. 30 tablet 3    atorvastatin (LIPITOR) 40 MG tablet Take 1 tablet (40 mg total) by mouth once daily. 30 tablet 3    calcium acetate,phosphat bind, (PHOSLO) 667 mg tablet Take 1 tablet (667 mg total) by mouth 3 (three) times daily with meals. 90 tablet 3    clopidogreL (PLAVIX) 75 mg  tablet Take 1 tablet (75 mg total) by mouth once daily. 90 tablet 3    cyanocobalamin (VITAMIN B-12) 250 MCG tablet Take 1 tablet (250 mcg total) by mouth once daily. 90 tablet 3    famotidine (PEPCID) 20 MG tablet Take 1 tablet (20 mg total) by mouth once daily.      metoprolol tartrate (LOPRESSOR) 25 MG tablet Take 1 tablet (25 mg total) by mouth once daily. 90 tablet 3    polyethylene glycol (GLYCOLAX) 17 gram PwPk Take 17 g by mouth 2 (two) times daily as needed (Constipation).  0    traMADoL (ULTRAM) 50 mg tablet Take 1 tablet (50 mg total) by mouth every 24 hours as needed for Pain. 90 tablet 0      Allergies: Patient has no known allergies.    No family history on file.  Social History     Tobacco Use    Smoking status: Never Smoker    Smokeless tobacco: Never Used   Substance Use Topics    Alcohol use: Never    Drug use: Never     Review of Systems  Unable to assess 2/2 AMS  Objective:     Vitals:    Temp: (!) 101.3 °F (38.5 °C)  Pulse: 90  BP: (!) 102/52  MAP (mmHg): 74  Resp: 20  SpO2: 100 %  Oxygen Concentration (%): 70  O2 Device (Oxygen Therapy): ventilator  Vent Mode: A/C  Set Rate: 15 BPM  Vt Set: 400 mL  PEEP/CPAP: 5 cmH20  Peak Airway Pressure: 21 cmH2O  Mean Airway Pressure: 8.8 cmH20  Plateau Pressure: 0 cmH20    Temp  Min: 101.1 °F (38.4 °C)  Max: 104.4 °F (40.2 °C)  Pulse  Min: 90  Max: 126  BP  Min: 88/49  Max: 133/62  MAP (mmHg)  Min: 67  Max: 99  Resp  Min: 20  Max: 20  SpO2  Min: 96 %  Max: 100 %  Oxygen Concentration (%)  Min: 70  Max: 70    No intake/output data recorded.           Physical Exam  --sedation: none  --GCS: [id number]  --Mental Status: eyes closed no response to pain, no commands   --Pupils sluggish bilateral  --brainstem: only gag present  --Motor: no response to central pain. Triple flex in LLE to noxious stimuli intermittently  --sensory: see motor  --Reflexes: not tested  --Gait: deferred    Today I personally reviewed pertinent medications, lines/drains/airways, imaging,  cardiology results, laboratory results, microbiology results,

## 2022-05-16 NOTE — SUBJECTIVE & OBJECTIVE
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    B12 deficiency 5/10/2021    Chronic diastolic congestive heart failure 12/18/2019    Dyslipidemia 3/31/2019    ESRD on hemodialysis 5/7/2021    Essential hypertension 3/31/2019    GERD (gastroesophageal reflux disease) 7/23/2021    History of myocardial infarction 7/23/2021    Secondary hyperparathyroidism of renal origin 3/31/2019    Type 2 diabetes mellitus, without long-term current use of insulin 3/31/2019     Past Surgical History:   Procedure Laterality Date    DECLOTTING OF VASCULAR GRAFT Left 5/26/2021    Procedure: DECLOT-GRAFT;  Surgeon: REYNALDO Watters II, MD;  Location: [facility] OR [address] [place] [address];  Service: Cardiovascular;  Laterality: Left;  10.5 min  59.97 mGy  9.4910 Gy.cm  20ml Dye    FISTULOTOMY      TOE AMPUTATION Left 3/31/2022    Procedure: AMPUTATION, TOE LEFT;  Surgeon: Yennifer Matias DPM;  [facility];  Service: Podiatry;  Laterality: Left;    VENOPLASTY Left 5/26/2021    Procedure: ANGIOPLASTY, VEIN;  Surgeon: REYNALDO Watters II, MD;  [facility];  Service: Cardiovascular;  Laterality: Left;  balloon angioplasty     Family History    None       Tobacco Use    Smoking status: Never Smoker    Smokeless tobacco: Never Used   Substance and Sexual Activity    Alcohol use: Never    Drug use: Never    Sexual activity: Not Currently     Review of Systems   Unable to perform ROS: Intubated   Objective:     Weight: 77.1 kg (170 lb)  Body mass index is 27.44 kg/m².  Vital Signs (Most Recent):  Temp: (!) 104.4 °F (40.2 °C) (05/16/22 1011)  Pulse: 110 (05/16/22 1101)  Resp: 20 (05/16/22 0855)  BP: 121/62 (05/16/22 1101)  SpO2: 100 % (05/16/22 1101)   Vital Signs (24h Range):  Temp:  [104.4 °F (40.2 °C)] 104.4 °F (40.2 °C)  Pulse:  [109-126] 110  Resp:  [20] 20  SpO2:  [96 %-100 %] 100 %  BP:  ()/(52-92) 121/62     Date 05/16/22 0700 - 05/17/22 0659   Shift 5839-1797 1589-6872 9887-2778 24 Hour Total   INTAKE   IV Piggyback 100   100   Shift Total(mL/kg) 100(1.3)   100(1.3)   OUTPUT   Other 800   800   Shift Total(mL/kg) 800(10.4)   800(10.4)   Weight (kg) 77.1 77.1 77.1 77.1              Vent Mode: A/C  Oxygen Concentration (%):  [70] 70  Resp Rate Total:  [15 br/min] 15 br/min  Vt Set:  [400 mL] 400 mL  PEEP/CPAP:  [5 cmH20] 5 cmH20  Mean Airway Pressure:  [9.1 cmH20-9.2 cmH20] 9.1 cmH20         NG/OG Tube 05/16/22 0830 Tensas sump 18 Fr. (Active)            Hemodialysis AV Fistula Left upper arm (Active)       Physical Exam    Neurosurgery Physical Exam    [id number]  Intubated, not sedated  RP/LP 5mm and fixed  No cough, corneals, or oculocephalic  No movement in extremities to stimulation    Significant Labs:  Recent Labs   Lab 05/16/22 0828   *      K 5.3*      CO2 22*   BUN 47*   CREATININE 7.9*   CALCIUM 8.4*     Recent Labs   Lab 05/16/22 0828   WBC 6.10   HGB 12.2   HCT 39.2        Recent Labs   Lab 05/16/22 0921   INR 1.4*     Microbiology Results (last 7 days)       Procedure Component Value Units Date/Time    Blood culture x two cultures. Draw prior to antibiotics. [[id number]] Collected: 05/16/22 0921    Order Status: Sent Specimen: Blood from Line, Femoral, Right Updated: 05/16/22 0938    Blood culture x two cultures. Draw prior to antibiotics. [[id number]] Collected: 05/16/22 0921    Order Status: Sent Specimen: Blood from Line, Femoral, Right Updated: 05/16/22 0938          All pertinent labs from the last 24 hours have been reviewed.    Significant Diagnostics:  I have reviewed all pertinent imaging results/findings within the past 24 hours.  X-Ray Chest AP Portable    Result Date: 5/16/2022  1. The endotracheal tube position as noted and should be retracted 2.5 cm. 2. Position of enteric tube tip and side port hole as noted and should be further advanced into  the stomach 3-5 cm. 3. Development bilateral perihilar and lower lung zone interstitial opacities as noted. The findings and recommendations as noted above were personally reviewed with Dr. Jaylon Quiñones by Dr. Mooney at 09:41 May 16, 2022 Electronically signed by: Barrett Mooney Date:    05/16/2022 Time:    09:42    X-Ray Pelvis Routine AP    Result Date: 5/16/2022  As above. Electronically signed by: Barrett Mooney Date:    05/16/2022 Time:    09:51    CTA STROKE MULTI-PHASE    Result Date: 5/16/2022  Large hemorrhage with surrounding edema involving the left thalamus and midbrain with intraventricular extension as detailed above.  Largest volume of blood is centered in the 3rd ventricle measuring up to 3.3 cm in transverse dimension Enlargement of the ventricular system with surrounding periventricular edema concerning for acute obstructive hydrocephalus. Trace subarachnoid hemorrhage along the left medial parietal lobe. Chronic microvascular ischemic change. Endotracheal tube is visualized, with tip at the right mainstem bronchus origin.  Retraction of the endotracheal tube is recommended. Trace right pleural effusion as well as patchy heterogeneous opacities throughout both lungs with right upper lobe predominance.  Findings are nonspecific, may represent infection or non infectious inflammatory process, with aspiration also a consideration. No aneurysm or vascular malformation. Mixed calcific and atheromatous atherosclerosis involving the carotid bulbs with approximately 30% stenosis at the right carotid bifurcation and approximately 50 stenosis at the left carotid bifurcation. Other findings as above. COMMUNICATION This critical result was discovered/received at 09:45.  The critical information above was relayed directly by me by telephone to Dr. Quiñones with emergency medicine on 05/16/2022 at 09:50. Electronically signed by resident: Avila Alvarez Date:    05/16/2022 Time:    09:46 Electronically signed by: Bakari  MD Sondra Date:    05/16/2022 Time:    11:25

## 2022-05-16 NOTE — H&P
[facility] - Emergency Dept  Neurocritical Care  History & Physical    Admit Date: 5/16/2022  Service Date: 05/16/2022  Length of Stay: 0    Subjective:     Chief Complaint: IVH (intraventricular hemorrhage)    History of Present Illness: Kavya Figueroa is a 76 year old female with history of ESRD on dialysis, HTN, GERD, MI who presents by ems from the nursing home for AMS, found to have ICH/IVH. Patient nonresponsive unable to provide Hx, lives in nursing home, family at bedside.  Per charts: patient was last known normal last night at unknown time and this am when the patient was assessed by staff she was not responsive. On arrival to the ED the patient was unresponsive and code stroke was called. Patient was subsequently intubated for airway protection prior to imaging following discussion with the patients daughter who confirmed the patient was a full code and she did want the patient to be intubated. CTH with large ICH/IVH, poor exam.      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    B12 deficiency 5/10/2021    Chronic diastolic congestive heart failure 12/18/2019    Dyslipidemia 3/31/2019    ESRD on hemodialysis 5/7/2021    Essential hypertension 3/31/2019    GERD (gastroesophageal reflux disease) 7/23/2021    History of myocardial infarction 7/23/2021    Secondary hyperparathyroidism of renal origin 3/31/2019    Type 2 diabetes mellitus, without long-term current use of insulin 3/31/2019     Past Surgical History:   Procedure Laterality Date    DECLOTTING OF VASCULAR GRAFT Left 5/26/2021    Procedure: DECLOT-GRAFT;  Surgeon: REYNALDO Watters II, MD;  Location: [facility] OR [address] [place] [address];  Service: Cardiovascular;  Laterality: Left;  10.5 min  59.97 mGy  9.4910 Gy.cm  20ml Dye    FISTULOTOMY      TOE AMPUTATION Left 3/31/2022    Procedure: AMPUTATION, TOE LEFT;  Surgeon: Yenniefr Matias DPM;  Location: [facility] OR [address] [place] [address];  Service: Podiatry;  Laterality: Left;     VENOPLASTY Left 5/26/2021    Procedure: ANGIOPLASTY, VEIN;  Surgeon: REYNALDO Watters II, MD;  Location: [facility] OR [address] [place] [address];  Service: Cardiovascular;  Laterality: Left;  balloon angioplasty      No current facility-administered medications on file prior to encounter.     Current Outpatient Medications on File Prior to Encounter   Medication Sig Dispense Refill    acetaminophen (TYLENOL) 325 MG tablet Take 2 tablets (650 mg total) by mouth every 6 (six) hours as needed (for pain scale 1-4 or fever over 101).  0    apixaban (ELIQUIS) 2.5 mg Tab Take 1 tablet (2.5 mg total) by mouth 2 (two) times daily.      aspirin (ECOTRIN) 81 MG EC tablet Take 1 tablet (81 mg total) by mouth once daily. 30 tablet 3    atorvastatin (LIPITOR) 40 MG tablet Take 1 tablet (40 mg total) by mouth once daily. 30 tablet 3    calcium acetate,phosphat bind, (PHOSLO) 667 mg tablet Take 1 tablet (667 mg total) by mouth 3 (three) times daily with meals. 90 tablet 3    clopidogreL (PLAVIX) 75 mg tablet Take 1 tablet (75 mg total) by mouth once daily. 90 tablet 3    cyanocobalamin (VITAMIN B-12) 250 MCG tablet Take 1 tablet (250 mcg total) by mouth once daily. 90 tablet 3    famotidine (PEPCID) 20 MG tablet Take 1 tablet (20 mg total) by mouth once daily.      metoprolol tartrate (LOPRESSOR) 25 MG tablet Take 1 tablet (25 mg total) by mouth once daily. 90 tablet 3    polyethylene glycol (GLYCOLAX) 17 gram PwPk Take 17 g by mouth 2 (two) times daily as needed (Constipation).  0    traMADoL (ULTRAM) 50 mg tablet Take 1 tablet (50 mg total) by mouth every 24 hours as needed for Pain. 90 tablet 0      Allergies: Patient has no known allergies.    No family history on file.  Social History     Tobacco Use    Smoking status: Never Smoker    Smokeless tobacco: Never Used   Substance Use Topics    Alcohol use: Never    Drug use: Never     Review of Systems  Unable to assess 2/2 AMS  Objective:     Vitals:    Temp: (!) 101.3 °F (38.5  °C)  Pulse: 90  BP: (!) 102/52  MAP (mmHg): 74  Resp: 20  SpO2: 100 %  Oxygen Concentration (%): 70  O2 Device (Oxygen Therapy): ventilator  Vent Mode: A/C  Set Rate: 15 BPM  Vt Set: 400 mL  PEEP/CPAP: 5 cmH20  Peak Airway Pressure: 21 cmH2O  Mean Airway Pressure: 8.8 cmH20  Plateau Pressure: 0 cmH20    Temp  Min: 101.1 °F (38.4 °C)  Max: 104.4 °F (40.2 °C)  Pulse  Min: 90  Max: 126  BP  Min: 88/49  Max: 133/62  MAP (mmHg)  Min: 67  Max: 99  Resp  Min: 20  Max: 20  SpO2  Min: 96 %  Max: 100 %  Oxygen Concentration (%)  Min: 70  Max: 70    No intake/output data recorded.           Physical Exam  --sedation: none  --GCS: [id number]  --Mental Status: eyes closed no response to pain, no commands   --Pupils sluggish bilateral  --brainstem: only gag present  --Motor: no response to central pain. Triple flex in LLE to noxious stimuli intermittently  --sensory: see motor  --Reflexes: not tested  --Gait: deferred    Today I personally reviewed pertinent medications, lines/drains/airways, imaging, cardiology results, laboratory results, microbiology results,      Assessment/Plan:     Neuro  * IVH (intraventricular hemorrhage)  Kavya Figueroa is a 76 y.o. female with ESRD on dialysis, HTN, GERD, MI who presents with AMS found to have a large IVH with hydrocephalus ICH score 4. S/P mannitol with no improvement in exam. NSGY evaluated and discussed with family, no intervention.     After extensive discussion about the poor prognosis, especially in absence of interventions with daughter at bedside, and exploring what the patient would have wanted for herself in such situation, she requested DNR & avoid invasive/aggressive measures at this time. likely plan for transition to comfort as soon as rest of family arrives to see the patient. However [place] they would like to continue.    - Admit to NCC  - Neuro checks Q1  - SBP< 140  - Hold AC/AP  - Coags & CBC  - NSGY evaluated; no  intervention  - PT/OT/SLP            Hyperglycemia; DKA?   - On insulin drip  - CMP    Sepsis  - levo  - ABx  - Cx      The patient is being Prophylaxed for:  Venous Thromboembolism with: Mechanical  Stress Ulcer with: H2B  Ventilator Pneumonia with: chlorhexidine oral care    Activity Orders          Turn patient starting at 05/16 1400    Elevate HOB starting at 05/16 1312    Diet NPO: NPO starting at 05/16 1312    Bed rest starting at 05/16 0826        DNR    Horacio Posada MD  Neurocritical Care  [facility] - Emergency Dept

## 2022-05-16 NOTE — ASSESSMENT & PLAN NOTE
Kavya Figueroa is a 76 y.o. female with ESRD on dialysis, HTN, GERD, MI who presents with AMS found to have a large IVH with hydrocephalus ICH score 4. S/P mannitol with no improvement in exam. NSGY evaluated and discussed with family, no intervention.     After extensive discussion about the poor prognosis, especially in absence of interventions with daughter at bedside, and exploring what the patient would have wanted for herself in such situation, she requested DNR & avoid invasive/aggressive measures at this time. likely plan for transition to comfort as soon as rest of family arrives to see the patient. However since [place], they would like to continue.    - Admit to NCC  - Neuro checks Q1  - SBP< 140  - Hold AC/AP  - Coags & CBC  - NSGY evaluated; no intervention  - PT/OT/SLP

## 2022-05-16 NOTE — ASSESSMENT & PLAN NOTE
76 year old female with history of ESRD on dialysis, HTN, GERD, MI who presents by ems from the nursing home. It is reported the patients last known normal was last night at unknown time and this am when the patient was assessed by staff she was not responsive. On arrival to the ED the patient was unresponsive and code stroke was called. On assessment by vascular neurology the patient had a GCS of 3 and was in the process of being intubated. Initial imaging delayed due to need for emergent intubation and lack of vascular access. NIH at that time (after administration of sedative) was 28. /97. Last blood glucose was reportedly 300. She is reportedly on Eliquis although unconfirmed at this time. ED team aware and began process of working towards reversal. CTA demonstrates large intraparenchymal and intraventricular hemorrhage. Neurosurgery and neurocritical care consulted. Patient currently intubated, as performed during course of treatment, but is now DNR per NCC discussion with family.    -After additional discussion between neurosurgery, NCC, and family, due to poor prognosis and in accordance with patient's previously expressed wishes, decision made to transition patient to comfort care once additional family arrives to see patient.    Antithrombotics for secondary stroke prevention: Antiplatelets: None: Intracerebral Hemorrhage    Statins for secondary stroke prevention and hyperlipidemia, if present:   Statins: None: Reason: Transitioning to comfort care    Aggressive risk factor modification: HTN, DM, HLD, Diet, Exercise     Rehab efforts: The patient has been evaluated by a stroke team provider and the therapy needs have been fully considered based off the presenting complaints and exam findings. The following therapy evaluations are needed: None- transitioning to comfort care    Diagnostics ordered/pending: None -transitioning to comfort care    VTE prophylaxis: Mechanical prophylaxis: Place SCDs  None:  Reason for No Pharmacological VTE Prophylaxis: History of systemic or intracranial bleeding    BP parameters: ICH: SBP <160

## 2022-05-16 NOTE — CONSULTS
[facility] - Emergency Dept  Vascular Neurology  [facility]  Consult Note    Inpatient consult to Vascular (Stroke) Neurology  Consult performed by: REYNALDO Iglesias MD  Consult ordered by: Jaylon Quiñones MD        Assessment/Plan:     Patient is a 76 y.o. year old female with:    IVH (intraventricular hemorrhage)  76 year old female with history of ESRD on dialysis, HTN, GERD, MI who presents by ems from the nursing home. It is reported the patients last known normal was last night at unknown time and this am when the patient was assessed by staff she was not responsive. On arrival to the ED the patient was unresponsive and code stroke was called. On assessment by vascular neurology the patient had a GCS of 3 and was in the process of being intubated. Initial imaging delayed due to need for emergent intubation and lack of vascular access. NIH at that time (after administration of sedative) was 28. /97. Last blood glucose was reportedly 300. She is reportedly on Eliquis although unconfirmed at this time. ED team aware and began process of working towards reversal. CTA demonstrates large intraparenchymal and intraventricular hemorrhage. Neurosurgery and neurocritical care consulted. Patient currently intubated, as performed during course of treatment, but is now DNR per NCC discussion with family.    -After additional discussion between neurosurgery, NCC, and family, due to poor prognosis and in accordance with patient's previously expressed wishes, decision made to transition patient to comfort care once additional family arrives to see patient.    Antithrombotics for secondary stroke prevention: Antiplatelets: None: Intracerebral Hemorrhage    Statins for secondary stroke prevention and hyperlipidemia, if present:   Statins: None: Reason: Transitioning to comfort care    Aggressive risk factor modification: HTN, DM, HLD, Diet, Exercise     Rehab efforts: The patient has been evaluated by a  stroke team provider and the therapy needs have been fully considered based off the presenting complaints and exam findings. The following therapy evaluations are needed: None- transitioning to comfort care    Diagnostics ordered/pending: None -transitioning to comfort care    VTE prophylaxis: Mechanical prophylaxis: Place SCDs  None: Reason for No Pharmacological VTE Prophylaxis: History of systemic or intracranial bleeding    BP parameters: ICH: SBP <160            STROKE DOCUMENTATION     Acute Stroke Times   Last Known Normal Date: 05/15/22  Unknown Normal Time: Unknown Time  Symptom Onset Date: 05/16/22  Unknown Symptom Onset Time: Unknown Time  Stroke Team Called Date: 05/16/22  Stroke Team Called Time: 0815  Stroke Team Arrival Date: 05/16/22  Stroke Team Arrival Time: 0818  CT Interpretation Time: 0930  Alteplase Recommended: No  CTA Interpretation Time: 0930  Thrombectomy Recommended: No    NIH Scale:  Interval: baseline  1a. Level of Consciousness: 3-->Responds only with reflex motor or autonomic effects or totally unresponsive, flaccid, and areflexic  1b. LOC Questions: 1-->Answers one question correctly  1c. LOC Commands: 2-->Performs neither task correctly  2. Best Gaze: 0-->Normal  3. Visual: 3-->Bilateral hemianopia (blind including cortical blindness)  4. Facial Palsy: 0-->Normal symmetrical movements  5a. Motor Arm, Left: 4-->No movement  5b. Motor Arm, Right: 4-->No movement  6a. Motor Leg, Left: 4-->No movement  6b. Motor Leg, Right: 4-->No movement  7. Limb Ataxia: 0-->Absent  8. Sensory: 0-->Normal, no sensory loss  9. Best Language: 3-->Mute, global aphasia, no usable speech or auditory comprehension  10. Dysarthria: (UN) Intubated or other physical barrier  11. Extinction and Inattention (formerly Neglect): 0-->No abnormality  Total (NIH Stroke Scale): 28    Modified Mery Score: 5  Valerie Coma Scale:3   ABCD2 Score:    [id number] Score:   HAS -BLED Score:   ICH Score:   Hunt & Ngo  "Classification:Grade V      Thrombolysis Candidate? No, CT findings (ICH, SAH, Large core infarct)     Delays to Thrombolysis?  Not Applicable    Interventional Revascularization Candidate?   Is the patient eligible for mechanical endovascular reperfusion (CARRIE)?  ICH    Delays to Thrombectomy? Not Applicable    Hemorrhagic change of an Ischemic Stroke: Does this patient have an ischemic stroke with hemorrhagic changes? No     Subjective:     History of Present Illness:  Patient unable to provide history. HPI per ED provider notes- "Kavya Figueroa is a 76 year old female with history of ESRD on dialysis, HTN, GERD, MI who presents by ems from the nursing home. It is reported the patients last known normal was last night at unknown time and this am when the patient was assessed by staff she was not responsive. On arrival to the ED the patient was unresponsive and code stroke was called. Patient was subsequently intubated for airway protection prior to imaging following discussion with the patients daughter who confirmed the patient was a full code and she did want the patient to be intubated."    Patient was recently admitted in March for osteomyelitis and subsequently underwent amputation of L great toe. On assessment by vascular neurology the patient had a GCS of 3 and was in the process of being intubated. Initial imaging delayed due to need for emergent intubation and lack of vascular access. Gag reflex intact at time of initial assessment. NIH at that time was 28. /97. Last blood glucose was reportedly 300. She is reportedly on Eliquis although unconfirmed at this time. CTA demonstrates large intraparenchymal and intraventricular hemorrhage. Neurosurgery and neurocritical care consulted. Patient currently intubated as performed during course of treatment, but is now DNR per NCC discussion with family, with expected transition to comfort care due to patient's previously expressed wishes and poor " prognosis.          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    B12 deficiency 5/10/2021    Chronic diastolic congestive heart failure 12/18/2019    Dyslipidemia 3/31/2019    ESRD on hemodialysis 5/7/2021    Essential hypertension 3/31/2019    GERD (gastroesophageal reflux disease) 7/23/2021    History of myocardial infarction 7/23/2021    Secondary hyperparathyroidism of renal origin 3/31/2019    Type 2 diabetes mellitus, without long-term current use of insulin 3/31/2019     Past Surgical History:   Procedure Laterality Date    DECLOTTING OF VASCULAR GRAFT Left 5/26/2021    Procedure: DECLOT-GRAFT;  Surgeon: REYNALDO Watters II, MD;  Location: [facility] OR [address] [place] [address];  Service: Cardiovascular;  Laterality: Left;  10.5 min  59.97 mGy  9.4910 Gy.cm  20ml Dye    FISTULOTOMY      TOE AMPUTATION Left 3/31/2022    Procedure: AMPUTATION, TOE LEFT;  Surgeon: Yennifer Matias DPM;  Location: [facility] OR [address] [place] [address];  Service: Podiatry;  Laterality: Left;    VENOPLASTY Left 5/26/2021    Procedure: ANGIOPLASTY, VEIN;  Surgeon: REYNALDO Watters II, MD;  Location: [facility] OR [address] [place] [address];  Service: Cardiovascular;  Laterality: Left;  balloon angioplasty     No family history on file.  Social History     Tobacco Use    Smoking status: Never Smoker    Smokeless tobacco: Never Used   Substance Use Topics    Alcohol use: Never    Drug use: Never     Review of patient's allergies indicates:  No Known Allergies    Medications: I have reviewed the current medication administration record.    (Not in a hospital admission)      Review of Systems   Unable to perform ROS: Acuity of condition   Objective:     Vital Signs (Most Recent):  Temp: (!) 101.3 °F (38.5 °C) (05/16/22 1232)  Pulse: 92 (05/16/22 1232)  Resp: 20 (05/16/22 0855)  BP: (!) 98/50 (05/16/22 1232)  SpO2: 100 % (05/16/22 1232)    Vital Signs Range (Last 24H):  Temp:  [101.1 °F (38.4 °C)-104.4 °F (40.2 °C)]   Pulse:  []   Resp:  [20]    BP: ()/(49-92)   SpO2:  [96 %-100 %]     Physical Exam  Constitutional:       Appearance: She is toxic-appearing.   HENT:      Head: Normocephalic and atraumatic.   Eyes:      Comments: 5 mm and non-reactive (post sedation administration)   Pulmonary:      Comments: Intubated  Abdominal:      General: Abdomen is flat.      Palpations: Abdomen is soft.       Neurological Exam:   LOC: comatose  Attention Span: Nonresponsive  Language: Mute  Facial Movement (CN VII): Symmetric facial expression    Gag Reflex: present  Motor: Arm left  Plegia 0/5  Leg left  Plegia 0/5  Arm right  Plegia 0/5  Leg right Plegia 0/5      Laboratory:  CMP:   Recent Labs   Lab 05/16/22  0828   CALCIUM 8.4*   ALBUMIN 2.8*   PROT 6.5      K 5.3*   CO2 22*      BUN 47*   CREATININE 7.9*   ALKPHOS 80   ALT <5*   AST 13   BILITOT 0.7     CBC:   Recent Labs   Lab 05/16/22  0828   WBC 6.10   RBC 4.22   HGB 12.2   HCT 39.2      MCV 93   MCH 28.9   MCHC 31.1*     Lipid Panel:   Recent Labs   Lab 05/16/22  0828   CHOL 148   LDLCALC 89.6   HDL 43   TRIG 77     Coagulation:   Recent Labs   Lab 05/16/22  0921   INR 1.4*     Hgb A1C: No results for input(s): HGBA1C in the last 168 hours.  TSH:   Recent Labs   Lab 05/16/22  0828   TSH 1.393       Diagnostic Results:      Brain imaging:  CTA MP 5/16/22  Impression:     Large hemorrhage with surrounding edema involving the left thalamus and midbrain with intraventricular extension as detailed above.  Largest volume of blood is centered in the 3rd ventricle measuring up to 3.3 cm in transverse dimension     Enlargement of the ventricular system with surrounding periventricular edema concerning for acute obstructive hydrocephalus.     Trace subarachnoid hemorrhage along the left medial parietal lobe.     Chronic microvascular ischemic change.     Endotracheal tube is visualized, with tip at the right mainstem bronchus origin.  Retraction of the endotracheal tube is recommended.      Trace right pleural effusion as well as patchy heterogeneous opacities throughout both lungs with right upper lobe predominance.  Findings are nonspecific, may represent infection or non infectious inflammatory process, with aspiration also a consideration.     No aneurysm or vascular malformation.     Mixed calcific and atheromatous atherosclerosis involving the carotid bulbs with approximately 30% stenosis at the right carotid bifurcation and approximately 50 stenosis at the left carotid bifurcation.  Vessel Imaging:  See brain imaging    Cardiac Evaluation:   AUGUST Iglesias MD  [facility]  Department of Vascular Neurology   [facility]

## 2022-05-16 NOTE — ED PROVIDER NOTES
Encounter Date: 5/16/2022       History     Chief Complaint   Patient presents with    Altered Mental Status     Arrived via [facility] from NH, c/o altered mental status, baseline unclear, not responsive to stimuli, drooling onarrival, stroke code activated, dr russell at bedside, pt to room 3 for airway protection prior to ct     The history is provided by the patient, a relative and medical records. The history is limited by the condition of the patient. No  was used.        Kavya Figueroa is a 76 year old female with history of ESRD on dialysis, HTN, GERD, MI who presents by ems from the nursing home. It is reported the patients last known normal was last night at unknown time and this am when the patient was assessed by staff she was not responsive. On arrival to the ED the patient was unresponsive and code stroke was called. Patient was subsequently intubated for airway protection prior to imaging following discussion with the patients daughter who confirmed the patient was a full code and she did want the patient to be intubated.      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    B12 deficiency 5/10/2021    Chronic diastolic congestive heart failure 12/18/2019    Dyslipidemia 3/31/2019    ESRD on hemodialysis 5/7/2021    Essential hypertension 3/31/2019    GERD (gastroesophageal reflux disease) 7/23/2021    History of myocardial infarction 7/23/2021    Secondary hyperparathyroidism of renal origin 3/31/2019    Type 2 diabetes mellitus, without long-term current use of insulin 3/31/2019     Past Surgical History:   Procedure Laterality Date    DECLOTTING OF VASCULAR GRAFT Left 5/26/2021    Procedure: DECLOT-GRAFT;  Surgeon: REYNALDO Watters II, MD;  Location: [facility] OR [address] [place] [address];  Service: Cardiovascular;  Laterality: Left;  10.5 min  59.97 mGy  9.4910 Gy.cm  20ml Dye    FISTULOTOMY      TOE  AMPUTATION Left 3/31/2022    Procedure: AMPUTATION, TOE LEFT;  Surgeon: Yennifer Matias DPM;  Location: [facility] OR [address] [place] [address];  Service: Podiatry;  Laterality: Left;    VENOPLASTY Left 5/26/2021    Procedure: ANGIOPLASTY, VEIN;  Surgeon: REYNALDO Watters II, MD;  Location: [facility] OR [address] [place] [address];  Service: Cardiovascular;  Laterality: Left;  balloon angioplasty     No family history on file.  Social History     Tobacco Use    Smoking status: Never Smoker    Smokeless tobacco: Never Used   Substance Use Topics    Alcohol use: Never    Drug use: Never     Review of Systems   Unable to perform ROS: Acuity of condition         Physical Exam     Initial Vitals   BP Pulse Resp Temp SpO2   05/16/22 0813 05/16/22 0813 05/16/22 0855 05/16/22 1009 05/16/22 0813   (!) 104/92 (!) 126 20 (!) 104.4 °F (40.2 °C) 96 %      MAP       --                Physical Exam    Nursing note and vitals reviewed.  Constitutional: She appears distressed.   HENT:   Head: Normocephalic.   Right Ear: External ear normal.   Left Ear: External ear normal.   Mouth/Throat: Oropharynx is clear and moist.   Eyes: EOM are normal. Pupils are equal, round, and reactive to light. Right eye exhibits no discharge. Left eye exhibits no discharge.   Neck: No tracheal deviation present. No JVD present.   Cardiovascular: Tachycardia present.    No murmur heard.  Pulmonary/Chest: Breath sounds normal. No respiratory distress. She has no wheezes. She has no rhonchi. She has no rales.   Abdominal: Abdomen is soft. She exhibits no distension.   Musculoskeletal:         General: Normal range of motion.     Neurological: She is unresponsive. GCS eye subscore is 1. GCS verbal subscore is 1. GCS motor subscore is 1.   Skin: Skin is warm. Capillary refill takes less than 2 seconds.   Left toe wound, no surrounding erythema          ED Course   Intubation    Date/Time: 5/16/2022 8:34 AM  Location procedure was performed: [facility] EMERGENCY DEPARTMENT  Performed by: Zac  MD Kurt  Authorized by: Jaylon Quiñones MD   Consent Done: Emergent Situation  Indications: airway protection  Intubation method: video-assisted  Patient status: paralyzed (RSI)  Preoxygenation: nasal cannula and nonrebreather mask  Sedatives: etomidate  Paralytic: rocuronium  Laryngoscope size: Glide 3  Tube size: 8.0 mm  Tube type: cuffed  Number of attempts: 1  Ventilation between attempts: BVM  Cricoid pressure: no  Cords visualized: yes  Post-procedure assessment: chest rise and ETCO2 monitor  Breath sounds: clear  Cuff inflated: yes  ETT to lip: 24 cm  Tube secured with: ETT saini  Chest x-ray findings: endotracheal tube too low  Tube repositioned: tube repositioned successfully  Patient tolerance: Patient tolerated the procedure well with no immediate complications    Central Line    Date/Time: 5/16/2022 9:17 AM  Performed by: Zac Hicks MD  Authorized by: Jaylon Quiñones MD     Supervisor Name:  Dr. Quiñones   Location procedure was performed:  [facility] EMERGENCY DEPARTMENT  Indications:  Med administration  Preparation:  Skin prepped with ChloraPrep  Location:  Right femoral  Site selection rationale:  Crach line needed to be palced   Catheter type:  Triple lumen  Catheter size:  7 Fr  Manometry: No    Number of attempts:  2  Securement:  Line sutured, chlorhexidine patch, sterile dressing applied and blood return through all ports  Complications: No    Estimated blood loss (mL):  25  XRay:  Placement verified by x-ray, no pneumothorax on x-ray and successful placement  Adverse Events:  None      Labs Reviewed   CBC W/ AUTO DIFFERENTIAL - Abnormal; Notable for the following components:       Result Value    MCHC 31.1 (*)     RDW 17.9 (*)     Immature Granulocytes 0.7 (*)     Lymph # 0.4 (*)     Gran % 80.9 (*)     Lymph % 6.2 (*)     All other components within normal limits   COMPREHENSIVE METABOLIC PANEL - Abnormal; Notable for the following components:    Potassium 5.3 (*)     CO2 22 (*)      Glucose 301 (*)     BUN 47 (*)     Creatinine 7.9 (*)     Calcium 8.4 (*)     Albumin 2.8 (*)     ALT <5 (*)     Anion Gap 20 (*)     eGFR if  5.1 (*)     eGFR if non  4.4 (*)     All other components within normal limits   PROTIME-INR - Abnormal; Notable for the following components:    Prothrombin Time 14.6 (*)     INR 1.4 (*)     All other components within normal limits   TROPONIN I - Abnormal; Notable for the following components:    Troponin I 0.250 (*)     All other components within normal limits   LACTIC ACID, PLASMA - Abnormal; Notable for the following components:    Lactate (Lactic Acid) 2.9 (*)     All other components within normal limits   URINALYSIS, REFLEX TO URINE CULTURE - Abnormal; Notable for the following components:    Appearance, UA Cloudy (*)     Protein, UA 2+ (*)     Glucose, UA 1+ (*)     Occult Blood UA 2+ (*)     Leukocytes, UA 3+ (*)     All other components within normal limits    Narrative:     Specimen Source->Urine   LACTIC ACID, PLASMA - Abnormal; Notable for the following components:    Lactate (Lactic Acid) 11.1 (*)     All other components within normal limits   URINALYSIS MICROSCOPIC - Abnormal; Notable for the following components:    RBC, UA 36 (*)     WBC, UA >100 (*)     WBC Clumps, UA Occasional (*)     Yeast, UA Rare (*)     All other components within normal limits    Narrative:     Specimen Source->Urine   HEMOGLOBIN A1C - Abnormal; Notable for the following components:    Hemoglobin A1C 5.8 (*)     All other components within normal limits    Narrative:     ADD ON [facility] #[id number] PER JEANETTE VALENTINE MD 05/16/2022  14:34    ISTAT PROCEDURE - Abnormal; Notable for the following components:    POC PO2 35 (*)     POC HCO3 28.2 (*)     POC SATURATED O2 69 (*)     POC TCO2 30 (*)     All other components within normal limits   POCT GLUCOSE - Abnormal; Notable for the following components:    POCT Glucose 335 (*)     All other components within  normal limits   POCT GLUCOSE - Abnormal; Notable for the following components:    POCT Glucose 392 (*)     All other components within normal limits   POCT GLUCOSE - Abnormal; Notable for the following components:    POCT Glucose 352 (*)     All other components within normal limits   POCT GLUCOSE - Abnormal; Notable for the following components:    POCT Glucose 329 (*)     All other components within normal limits   SARS-COV-2 RDRP GENE - Normal   CULTURE, BLOOD    Narrative:     Aerobic and anaerobic   CULTURE, BLOOD    Narrative:     Aerobic and anaerobic   CULTURE, URINE   TSH   LIPID PANEL   BETA - HYDROXYBUTYRATE, SERUM   HEMOGLOBIN A1C   TROPONIN I   POCT GLUCOSE, HAND-HELD DEVICE   TYPE & SCREEN   POCT GLUCOSE MONITORING CONTINUOUS     EKG Readings: (Independently Interpreted)   Initial Reading: No STEMI. Rhythm: Sinus Tachycardia. Heart Rate: 135. Clinical Impression: Sinus Tachycardia   NSSTWA     ECG Results          ECG 12 lead (Final result)  Result time 05/16/22 13:26:11    Final result by Interface, Lab In [facility] (05/16/22 13:26:11)                 Narrative:    Test Reason : I63.9,    Vent. Rate : 135 BPM     Atrial Rate : 135 BPM     P-R Int : 112 ms          QRS Dur : 068 ms      QT Int : 306 ms       P-R-T Axes : 059 052 147 degrees     QTc Int : 459 ms    Sinus tachycardia  Nonspecific ST and T wave abnormality  Abnormal ECG  When compared with ECG of 28-MAR-2022 21:39,  Vent. rate has increased BY  50 BPM  QRS duration has decreased  Non-specific change in ST segment in Inferior leads  Nonspecific T wave abnormality now evident in Lateral leads  Confirmed by Hardik Nick MD ([id number]) on 5/16/2022 1:26:04 PM    Referred By: [email or web address]   SELF           Confirmed By:Hardik Nick MD                            Imaging Results          X-Ray Chest AP Portable (Final result)  Result time 05/16/22 11:50:10    Final result by Barrett Mooney MD (05/16/22 11:50:10)                 Impression:      1. Position  of endotracheal and enteric tube as noted.  2. Otherwise stable support hardware and cardiac and pulmonary findings to earlier exam.      Electronically signed by: Barrett Mooney  Date:    05/16/2022  Time:    11:50             Narrative:    EXAMINATION:  XR CHEST AP PORTABLE    CLINICAL HISTORY:  Transient alteration of awareness    TECHNIQUE:  Single frontal view of the chest was performed.    COMPARISON:  May 16, 2022, 09:26 hours    FINDINGS:  The tip of the endotracheal tube is now position about a cm above the logan.  Though the tip of the enteric tube is not visualized but felt beneath the left hemidiaphragm and in the upper stomach, the side port hole is again felt position at the expected esophageal hiatus.  May wish to further advanced this tube 3 cm or so into the stomach.  Stable position of right jugular catheter.  Stable cardiac and pulmonary findings to earlier exam.                               CTA STROKE MULTI-PHASE (Final result)  Result time 05/16/22 11:25:46    Final result by Bakari Amaro MD (05/16/22 11:25:46)                 Impression:      Large hemorrhage with surrounding edema involving the left thalamus and midbrain with intraventricular extension as detailed above.  Largest volume of blood is centered in the 3rd ventricle measuring up to 3.3 cm in transverse dimension    Enlargement of the ventricular system with surrounding periventricular edema concerning for acute obstructive hydrocephalus.    Trace subarachnoid hemorrhage along the left medial parietal lobe.    Chronic microvascular ischemic change.    Endotracheal tube is visualized, with tip at the right mainstem bronchus origin.  Retraction of the endotracheal tube is recommended.    Trace right pleural effusion as well as patchy heterogeneous opacities throughout both lungs with right upper lobe predominance.  Findings are nonspecific, may represent infection or non infectious inflammatory process, with aspiration also a  consideration.    No aneurysm or vascular malformation.    Mixed calcific and atheromatous atherosclerosis involving the carotid bulbs with approximately 30% stenosis at the right carotid bifurcation and approximately 50 stenosis at the left carotid bifurcation.    Other findings as above.    COMMUNICATION  This critical result was discovered/received at 09:45.  The critical information above was relayed directly by me by telephone to Dr. Quiñones with emergency medicine on 05/16/2022 at 09:50.    Electronically signed by resident: Avila Alvarez  Date:    05/16/2022  Time:    09:46    Electronically signed by: Bakari Amaro MD  Date:    05/16/2022  Time:    11:25             Narrative:    EXAMINATION:  CTA STROKE MULTI-PHASE    CLINICAL HISTORY:  Neuro deficit, acute, stroke suspected;    TECHNIQUE:  Non contrast low dose axial images were obtained thought the head. CT angiogram was performed from the level of the logan to the top of the head following the IV administration of 75mL of Omnipaque 350.   Sagittal and coronal reconstructions and maximum intensity projection reconstructions were performed. Arterial stenosis percentages are based on NASCET measurement criteria.  Two additional phases of immediate post-contrast CTA images were performed through the head alone.    COMPARISON:  No relevant prior imaging for comparison.    FINDINGS:  Partial visualization enteric tube.  Endotracheal tube is visualized, with tip at the right mainstem bronchus origin (coronal series 604, image 159).  Right-sided central venous catheter.    Intracranial Compartment:    There is diffuse intraventricular hemorrhage layering in the bilateral occipital horns, as well as expanding the 3rd ventricle measuring up to 3.3 cm in transverse dimension.  Hemorrhage expands the cerebral aqueduct and 4th ventricle extending towards the obex.  Additionally, there is dilatation of the lateral ventricles body segments and temporal horns, noting  hypoattenuation surrounding the periventricular margins, concerning for acute obstructive hydrocephalus.    There is a heterogeneous hyperdense focus centered in the left thalamus measuring on the order of 2.0 x 1.7 cm surrounding hypoattenuation, likely representing left thalamic hemorrhage with surrounding edema.  Additional hemorrhage identified in the midbrain.  Punctate focus hyperdense material along the left medial parietal lobe (axial series 2, image 23), likely representing a small focus of subarachnoid hemorrhage.    Additionally, there is scattered patchy hypoattenuation involving the supratentorial white matter, while nonspecific, may represent sequela of chronic microvascular ischemic change.    Skull/Extracranial Contents (limited evaluation): No evidence for fracture.  Nonspecific well circumscribed heterogeneous focus along the left frontal calvarium measuring 1.4 cm (axial series 3, image 46).  Mastoid air cells are centrally clear.  Paranasal sinuses are essentially clear.    Trace right pleural effusion.  Atelectasis along the posterior aspects of the lungs.  Additionally, there are scattered patchy heterogeneous opacities mostly in the right upper lobe (axial series 5, image 48).  No evidence for consolidation or pulmonary mass.    Structures of the mediastinum are unremarkable.  No mediastinal lymphadenopathy.  Pulmonary artery is slightly enlarged without definite evidence for filling defect to suggest pulmonary embolus.    The thyroid is enlarged with multiple subcentimeter nodules throughout both lobes.    Left-sided 3 vessel arch with trace calcific atherosclerosis at the origins of the great vessels.    Extracranial carotid circulation: Calcific and atheromatous plaque at the bilateral carotid bulbs, with approximately 30% stenosis at the right carotid bifurcation and approximately 50 % stenosis at the left bifurcation.  No hemodynamically significant stenosis, aneurysmal dilatation, or  dissection.    Extracranial vertebral circulation: Calcific atherosclerosis at the origins of the bilateral vertebral arteries.  No hemodynamically significant stenosis, aneurysmal dilatation, or dissection.    Intracranial Arteries: Dense calcific atherosclerosis involving the cavernous portions of the internal carotid arteries.  Hypoplastic left A1 segment.  Mild multifocal intracranial atherosclerosis without evidence for focal high-grade stenosis, occlusion, or aneurysm.    Venous structures (limited evaluation): Normal.    Osseous structures of the neck demonstrate no evidence for fracture.  Degenerative changes of the visualized spine.  Temporomandibular joints are unremarkable.                               X-Ray Chest AP Portable (Final result)  Result time 05/16/22 09:42:38    Final result by Barrett Mooney MD (05/16/22 09:42:38)                 Impression:      1. The endotracheal tube position as noted and should be retracted 2.5 cm.  2. Position of enteric tube tip and side port hole as noted and should be further advanced into the stomach 3-5 cm.  3. Development bilateral perihilar and lower lung zone interstitial opacities as noted.  The findings and recommendations as noted above were personally reviewed with Dr. Jaylon Quiñones by Dr. Mooney at 09:41 May 16, 2022      Electronically signed by: Barrett Mooney  Date:    05/16/2022  Time:    09:42             Narrative:    EXAMINATION:  XR CHEST AP PORTABLE    CLINICAL HISTORY:  Sepsis;    TECHNIQUE:  Single frontal view of the chest was performed.    COMPARISON:  March 28, 2022    FINDINGS:  Interval placement of an endotracheal tube, tip directed at the orifice of the right mainstem bronchus.  This tube should be retracted 2.5 cm.  Interval placement of enteric tube, though the tip is beneath the left hemidiaphragm and in the stomach, side port hole in distal esophagus.  This tube should be further advanced 3-5 cm into the stomach.  Interval placement of  large-bore right jugular catheter, tip superimposing cavoatrial soft tissues.  Normal heart size.  Development of bilateral perihilar and bibasilar predominantly interstitial opacities that could be on the basis of edema and or atelectasis and or infiltrate.  No lobar or consolidative infiltrates.  No obvious pleural fluid blunting right or left costophrenic sulci.  No pneumothorax.  Reconfirmed prominent shoulder girdle arthritic findings as well as thoracic dextrocurvature and multilevel degenerative changes.                               X-Ray Pelvis Routine AP (Final result)  Result time 05/16/22 09:51:08    Final result by Barrett Mooney MD (05/16/22 09:51:08)                 Impression:      As above.      Electronically signed by: Barrett Mooney  Date:    05/16/2022  Time:    09:51             Narrative:    EXAMINATION:  XR PELVIS ROUTINE AP    CLINICAL HISTORY:  central line;    TECHNIQUE:  AP view of the pelvis was performed.    COMPARISON:  None.    FINDINGS:  Right femoral catheter present, tip directed medial to the position of the inferior right SI joint.  Degenerative changes lower lumbar spine.  Degenerative changes hip joint spaces.  Prominent enthesopathic changes about lesser more so than greater trochanters.  Vascular calcifications superimpose bilateral medial thighs.  Ill-defined 8 x 11 cm area of increased soft tissue density with some associated variable internal radiodensity could relate to distended rectal vault and variably dense stool but could also relate to enlarged uterus with calcifications.  Clinical correlation.  Variable size radiodensities superimposing left lower quadrant and left hemipelvis could relate to injection granuloma.                                 Medications   fentaNYL 50 mcg/mL injection 50 mcg (0 mcg Intravenous Hold 5/16/22 1153)   NORepinephrine 4 mg in dextrose 5% 250 mL infusion (premix) (titrating) (0 mcg/kg/min × 77.1 kg Intravenous Hold 5/16/22 1426)    fentaNYL 2500 mcg in 0.9% sodium chloride 250 mL infusion premix (titrating) (0 mcg/hr Intravenous Hold 5/16/22 1153)   dextrose 10% bolus 125 mL (has no administration in time range)   dextrose 10% bolus 250 mL (has no administration in time range)   sodium chloride 0.9% flush 10 mL (has no administration in time range)   labetalol 20 mg/4 mL (5 mg/mL) IV syring (has no administration in time range)   sodium chloride 0.9% flush 10 mL (has no administration in time range)   dextrose 10 % infusion (has no administration in time range)   dextrose 10 % infusion (has no administration in time range)   dextrose 10% bolus 125 mL (has no administration in time range)   dextrose 10% bolus 250 mL (has no administration in time range)   insulin regular in 0.9 % NaCl 100 unit/100 mL (1 unit/mL) infusion (1.4 Units/hr Intravenous Rate/Dose Change 5/16/22 1627)   vancomycin - pharmacy to dose (has no administration in time range)   piperacillin-tazobactam 4.5 g in sodium chloride 0.9% 100 mL IVPB (ready to mix system) (has no administration in time range)   levETIRAcetam injection 500 mg (has no administration in time range)   etomidate injection 20 mg (20 mg Intravenous Given 5/16/22 0820)   rocuronium injection 1 mg/kg (Dosing Weight) (100 mg Intravenous Given 5/16/22 0821)   lactated ringers bolus 1,000 mL (0 mLs Intravenous Stopped 5/16/22 1152)   vancomycin 1.25 g in dextrose 5% 250 mL IVPB (ready to mix) (0 mg/kg × 77.1 kg Intravenous Stopped 5/16/22 1203)   piperacillin-tazobactam 4.5 g in sodium chloride 0.9% 100 mL IVPB (ready to mix system) (0 g Intravenous Stopped 5/16/22 1026)   iohexoL (OMNIPAQUE 350) injection 75 mL (75 mLs Intravenous Given 5/16/22 0942)   acetaminophen suppository 650 mg (650 mg Rectal Given 5/16/22 1011)   mannitol 25% injection 50 g (0 g Intravenous Stopped 5/16/22 1112)   desmopressin (DDAVP) 30.84 mcg in sodium chloride 0.9% 50 mL IVPB (0 mcg/kg × 77.1 kg Intravenous Stopped 5/16/22 8052)  "  human prothrombin complex (PCC) (KCENTRA) 2,184 Units in sterile water for injection IVPB (0 Units Intravenous Stopped 5/16/22 1146)   sodium chloride 3% HYPERTONIC solution (0 mL/hr Intravenous Stopped 5/16/22 1556)   sodium chloride 0.9% bolus 250 mL (0 mLs Intravenous Stopped 5/16/22 1512)     Medical Decision Making:   History:   Old Medical Records: I decided to obtain old medical records.  Initial Assessment:   Patient presented to the ED for evaluation from the nursing home with altered mental status. Patient was intubated and central like was placed as above. At this time concerns CVA, ICH, Subdural/Epidural, Sepsis, and electrolyte abnormalities, and tox  with other still pending based on results of labs and imaging.  Patients labs and imaging has been ordered and will update as information becomes available. Patient found to have ICH. Neurosurgery and Neuro critical care were contacted and the patient was admitted to neuro ICU for continued monitoring and evaluation.   Differential Diagnosis:   Including, but not limited to:  ICH  CVA  Sepsis  Encephalopathy   Independently Interpreted Test(s):   I have ordered and independently interpreted EKG Reading(s) - see prior notes  Clinical Tests:   Lab Tests: Ordered and Reviewed  Radiological Study: Ordered and Reviewed  Medical Tests: Ordered and Reviewed  Sepsis Perfusion Assessment: "I attest a sepsis perfusion exam was performed within 6 hours of sepsis, severe sepsis, or septic shock presentation, following fluid resuscitation."  Other:   I have discussed this case with another health care provider.            Attending Attestation:   Physician Attestation Statement for Resident:  As the supervising MD   Physician Attestation Statement: I have personally seen and examined this patient.   I agree with the above history. -: 75 yo female presenting from NH for change in mental status.  Last normal last night.  History limited 2/2 patient condition.   Per " daughter, full code, agrees with intubation.   As the supervising MD I agree with the above PE.   -: GCS 3  Pupils equal  Wound to left toe  Tachycardia  Spontaneous respirations    As the supervising MD I agree with the above treatment, course, plan, and disposition.   -: Code stroke on arrival.  Intubated for airway protection due to mental status.  CTH with large hemorrhage with surrounding edema involving the left thalamus and midbrain with intraventricular extension   Keppra for seizure ppx, reversed anti-coagulation, mannitol ordered.  Neurosurgery and neuro ICU consulted.  Also febrile, received broad spectrum abx.  Hyperglycemia with AG, started insulin gtt.  Multiple conversations with daughter by various providers, will transition to DNR status.  Admitted to neuro ICU, awaiting family arrival.  Prognosis is grave.     I was personally present during the entire procedure.  I have reviewed and agree with the residents interpretation of the following: lab data, x-rays, CT scans and EKG.  I have reviewed the following: old records at this facility.        Attending Critical Care:   Critical Care Times:   Direct Patient Care (initial evaluation, reassessments, and time considering the case)................................................................14 minutes.   Additional History from reviewing old medical records or taking additional history from the family, EMS, PCP, etc.......................6 minutes.   Ordering, Reviewing, and Interpreting Diagnostic Studies...............................................................................................................6 minutes.   Documentation..................................................................................................................................................................................8 minutes.   Consultation with other Physicians.  .................................................................................................................................................10 minutes.   ==============================================================  · Total Critical Care Time - exclusive of procedural time: 44 minutes.  ==============================================================  Critical care was necessary to treat or prevent imminent or life-threatening deterioration of the following conditions: airway compromise and CNS failure.   The following critical care procedures were done by me (see procedure notes): airway management and central line placement *.   Critical care was time spent personally by me on the following activities: obtaining history from patient or relative, examination of patient, review of old charts, ordering lab, x-rays, and/or EKG, development of treatment plan with patient or relative, ordering and performing treatments and interventions, evaluation of patient's response to treatment, discussion with consultants, re-evaluation of patient's conition, ventilator management and end of life discussions.   Critical Care Condition: critical           ED Course as of 05/16/22 1731   Mon May 16, 2022   0853 Difficult IV access.  Attempted with US guided IV.  Will have to place central line.  [AB]   0940 CTA STROKE MULTI-PHASE [CH]   0945 Spoke with respiratory therapy will pull the tube back 2cm and we will repeat chest x ray  [CH]   1003 Spoke to neurosurgery who will come and evaluate the patient. Discussed possible reversal of her eliques. Pending neurosurgery recs.  [CH]   1012 Per neurosurgery adding Kcentra and DDAVP for the patient on eliques [CH]   1047 Potassium(!): 5.3  Hyperkalemia  [AB]   1047 Glucose(!): 301  Hyperglycemia  [AB]   1147 Update:  Neurosurgery no longer plans to intervene.  Neuro ICU requesting TEU.  Patient's daughter is aware and agrees with plan.  Awaiting family and  to present to ED.   [AB]   1152 Spoke with the patients daughter who states is good with plan for discontinuation of support to include IV medications, CPR, and removal of the ETT tube. States she is calling her  and other family and would come back to see the patient prior to removal of the ETT tube we do not have an ETA at this time.  [CH]   1222 Spoke to the patients daughter (luis felipe) who states that the patient has another daughter that is in [place] who wants to come and see the patient. States that she would like the measure continued at this time but if the patients heart was to stop then she does not want to have CPR preformed. At this time will contact neuro ICU for admission.  [CH]   1229 Called CCU to discuss updates and pending recs [CH]      ED Course User Index  [AB] Jaylon Quiñones MD  [CH] Zac Hicks MD             Clinical Impression:   Final diagnoses:  [R40.4] Altered consciousness  [R40.4] Altered consciousness - intubation reevaluate tube  [I61.5] IVH (intraventricular hemorrhage)  [R73.9] Hyperglycemia (Primary)  [A41.9] Sepsis, due to unspecified organism, unspecified whether acute organ dysfunction present  [E87.5] Hyperkalemia          ED Disposition Condition    Admit               Zac Hicks MD  Resident  05/16/22 1536       Jaylon Quiñones MD  05/16/22 1753

## 2022-05-16 NOTE — ASSESSMENT & PLAN NOTE
76F nursing home resident h/o ESRD on iHD, HTN, GERD, MI, presented via EMS with L thalamic IOCH (ICH score 3). We had a thorough discussion with the patient's daughter regarding the prognosis. She stated that the patient would not want any intervention given the low chance of a meaningful recovery.      --Patient admitted to [facility] on telemetry      -q1h neurochecks in ICU, q2h neurochecks in stepdown, q4h neurochecks on floor  --All labs and diagnostics reviewed  --CTH with large hemorrhage with surrounding edema involving the left thalamus and midbrain with intraventricular extension. Enlargement of the ventricular system with surrounding periventricular edema concerning for acute obstructive hydrocephalus. --Reverse anti-plt/coag medications per [facility].  --Medical management per [facility].  --No NSY intervention given pt's wishes. Accordingly, NSY will sign off. Please page with questions. Thank you for this interesting consult.     Dispo: Admit to NCC; NSY signing off.

## 2022-05-16 NOTE — CONSULTS
[facility] - Emergency Dept  Neurocritical Care  Consult Note    Admit Date: 5/16/2022  Service Date: 05/16/2022  Length of Stay: 0    Consults  Subjective:     Chief Complaint: <principal problem not specified>    History of Present Illness: Kavya Figueroa is a 76 year old female with history of ESRD on dialysis, HTN, GERD, MI who presents by ems from the nursing home for AMS, found to have ICH/IVH. Patient nonresponsive unable to provide Hx, lives in nursing home, family at bedside.  Per charts: patient was last known normal last night at unknown time and this am when the patient was assessed by staff she was not responsive. On arrival to the ED the patient was unresponsive and code stroke was called. Patient was subsequently intubated for airway protection prior to imaging following discussion with the patients daughter who confirmed the patient was a full code and she did want the patient to be intubated. CTH with large ICH/IVH, poor exam.      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    B12 deficiency 5/10/2021    Chronic diastolic congestive heart failure 12/18/2019    Dyslipidemia 3/31/2019    ESRD on hemodialysis 5/7/2021    Essential hypertension 3/31/2019    GERD (gastroesophageal reflux disease) 7/23/2021    History of myocardial infarction 7/23/2021    Secondary hyperparathyroidism of renal origin 3/31/2019    Type 2 diabetes mellitus, without long-term current use of insulin 3/31/2019     Past Surgical History:   Procedure Laterality Date    DECLOTTING OF VASCULAR GRAFT Left 5/26/2021    Procedure: DECLOT-GRAFT;  Surgeon: REYNALDO Watters II, MD;  Location: [facility] OR [address] [place] [address];  Service: Cardiovascular;  Laterality: Left;  10.5 min  59.97 mGy  9.4910 Gy.cm  20ml Dye    FISTULOTOMY      TOE AMPUTATION Left 3/31/2022    Procedure: AMPUTATION, TOE LEFT;  Surgeon: Yennifer Matias DPM;  Location: [facility] OR [address] [place] [address];  Service: Podiatry;  Laterality: Left;     VENOPLASTY Left 5/26/2021    Procedure: ANGIOPLASTY, VEIN;  Surgeon: REYNALDO Watters II, MD;  Location: [facility] OR [address] [place] [address];  Service: Cardiovascular;  Laterality: Left;  balloon angioplasty      No current facility-administered medications on file prior to encounter.     Current Outpatient Medications on File Prior to Encounter   Medication Sig Dispense Refill    acetaminophen (TYLENOL) 325 MG tablet Take 2 tablets (650 mg total) by mouth every 6 (six) hours as needed (for pain scale 1-4 or fever over 101).  0    apixaban (ELIQUIS) 2.5 mg Tab Take 1 tablet (2.5 mg total) by mouth 2 (two) times daily.      aspirin (ECOTRIN) 81 MG EC tablet Take 1 tablet (81 mg total) by mouth once daily. 30 tablet 3    atorvastatin (LIPITOR) 40 MG tablet Take 1 tablet (40 mg total) by mouth once daily. 30 tablet 3    calcium acetate,phosphat bind, (PHOSLO) 667 mg tablet Take 1 tablet (667 mg total) by mouth 3 (three) times daily with meals. 90 tablet 3    clopidogreL (PLAVIX) 75 mg tablet Take 1 tablet (75 mg total) by mouth once daily. 90 tablet 3    cyanocobalamin (VITAMIN B-12) 250 MCG tablet Take 1 tablet (250 mcg total) by mouth once daily. 90 tablet 3    famotidine (PEPCID) 20 MG tablet Take 1 tablet (20 mg total) by mouth once daily.      metoprolol tartrate (LOPRESSOR) 25 MG tablet Take 1 tablet (25 mg total) by mouth once daily. 90 tablet 3    polyethylene glycol (GLYCOLAX) 17 gram PwPk Take 17 g by mouth 2 (two) times daily as needed (Constipation).  0    traMADoL (ULTRAM) 50 mg tablet Take 1 tablet (50 mg total) by mouth every 24 hours as needed for Pain. 90 tablet 0      Allergies: Patient has no known allergies.    No family history on file.  Social History     Tobacco Use    Smoking status: Never Smoker    Smokeless tobacco: Never Used   Substance Use Topics    Alcohol use: Never    Drug use: Never     Review of Systems  Unable to assess 2/2 AMS & intubation  Objective:     Vitals:    Temp: (!) 104.4 °F  (40.2 °C)  Pulse: 99  BP: (!) 108/55  MAP (mmHg): 79  Resp: 20  SpO2: 100 %  Oxygen Concentration (%): 70  O2 Device (Oxygen Therapy): ventilator  Vent Mode: A/C  Set Rate: 15 BPM  Vt Set: 400 mL  PEEP/CPAP: 5 cmH20  Peak Airway Pressure: 21 cmH2O  Mean Airway Pressure: 8.8 cmH20  Plateau Pressure: 0 cmH20    Temp  Min: 104.4 °F (40.2 °C)  Max: 104.4 °F (40.2 °C)  Pulse  Min: 99  Max: 126  BP  Min: 88/52  Max: 133/62  MAP (mmHg)  Min: 67  Max: 99  Resp  Min: 20  Max: 20  SpO2  Min: 96 %  Max: 100 %  Oxygen Concentration (%)  Min: 70  Max: 70    No intake/output data recorded.           Physical Exam  --sedation: none  --GCS: [id number]  --Mental Status: eyes closed no response to pain, no commands   --Pupils sluggish bilateral  --brainstem: only gag present  --Motor: no response to central pain. Triple flex in LLE to noxious stimuli intermittently  --sensory: see motor  --Reflexes: not tested  --Gait: deferred      Today I personally reviewed pertinent medications, lines/drains/airways, imaging, cardiology results, laboratory results, microbiology results,      Assessment/Plan:     Neuro  IVH (intraventricular hemorrhage)  Kavya Figueroa is a 76 y.o. female with ESRD on dialysis, HTN, GERD, MI who presents with AMS found to have a large IVH with hydrocephalus ICH score 4. S/P mannitol with no improvement in exam. NSGY evaluated and discussed with family, no intervention.     After extensive discussion about the poor prognosis, especially in absence of interventions with daughter at bedside, and exploring what the patient would have wanted for herself in such situation, she requested DNR & avoid invasive/aggressive measures at this time, plan for transition to comfort as soon as rest of family arrives to see the patient.     Plan discussed with NSGY & ED teams    Will sign off. Please feel free to contact us with any questions              Activity Orders          Bed rest starting at 05/16 0826    Diet NPO:  NPO starting at 05/16 0815        DNR    Horacio Posada MD  Neurocritical Care  [facility] - Emergency Dept

## 2022-05-16 NOTE — SUBJECTIVE & OBJECTIVE
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    B12 deficiency 5/10/2021    Chronic diastolic congestive heart failure 12/18/2019    Dyslipidemia 3/31/2019    ESRD on hemodialysis 5/7/2021    Essential hypertension 3/31/2019    GERD (gastroesophageal reflux disease) 7/23/2021    History of myocardial infarction 7/23/2021    Secondary hyperparathyroidism of renal origin 3/31/2019    Type 2 diabetes mellitus, without long-term current use of insulin 3/31/2019     Past Surgical History:   Procedure Laterality Date    DECLOTTING OF VASCULAR GRAFT Left 5/26/2021    Procedure: DECLOT-GRAFT;  Surgeon: REYNALDO Watters II, MD;  Location: [facility] OR [address] [place] [address];  Service: Cardiovascular;  Laterality: Left;  10.5 min  59.97 mGy  9.4910 Gy.cm  20ml Dye    FISTULOTOMY      TOE AMPUTATION Left 3/31/2022    Procedure: AMPUTATION, TOE LEFT;  Surgeon: Yennifer Matias DPM;  Location: [facility] OR [address] [place] [address];  Service: Podiatry;  Laterality: Left;    VENOPLASTY Left 5/26/2021    Procedure: ANGIOPLASTY, VEIN;  Surgeon: REYNALDO Watters II, MD;  Location: [facility] OR [address] [place] [address];  Service: Cardiovascular;  Laterality: Left;  balloon angioplasty      No current facility-administered medications on file prior to encounter.     Current Outpatient Medications on File Prior to Encounter   Medication Sig Dispense Refill    acetaminophen (TYLENOL) 325 MG tablet Take 2 tablets (650 mg total) by mouth every 6 (six) hours as needed (for pain scale 1-4 or fever over 101).  0    apixaban (ELIQUIS) 2.5 mg Tab Take 1 tablet (2.5 mg total) by mouth 2 (two) times daily.      aspirin (ECOTRIN) 81 MG EC tablet Take 1 tablet (81 mg total) by mouth once daily. 30 tablet 3    atorvastatin (LIPITOR) 40 MG tablet Take 1 tablet (40 mg total) by mouth once daily. 30 tablet 3    calcium acetate,phosphat bind, (PHOSLO) 667 mg tablet Take 1 tablet (667 mg total) by mouth 3 (three) times daily with meals. 90 tablet 3    clopidogreL (PLAVIX) 75 mg  tablet Take 1 tablet (75 mg total) by mouth once daily. 90 tablet 3    cyanocobalamin (VITAMIN B-12) 250 MCG tablet Take 1 tablet (250 mcg total) by mouth once daily. 90 tablet 3    famotidine (PEPCID) 20 MG tablet Take 1 tablet (20 mg total) by mouth once daily.      metoprolol tartrate (LOPRESSOR) 25 MG tablet Take 1 tablet (25 mg total) by mouth once daily. 90 tablet 3    polyethylene glycol (GLYCOLAX) 17 gram PwPk Take 17 g by mouth 2 (two) times daily as needed (Constipation).  0    traMADoL (ULTRAM) 50 mg tablet Take 1 tablet (50 mg total) by mouth every 24 hours as needed for Pain. 90 tablet 0      Allergies: Patient has no known allergies.    No family history on file.  Social History     Tobacco Use    Smoking status: Never Smoker    Smokeless tobacco: Never Used   Substance Use Topics    Alcohol use: Never    Drug use: Never     Review of Systems  Unable to assess 2/2 AMS & intubation  Objective:     Vitals:    Temp: (!) 104.4 °F (40.2 °C)  Pulse: 99  BP: (!) 108/55  MAP (mmHg): 79  Resp: 20  SpO2: 100 %  Oxygen Concentration (%): 70  O2 Device (Oxygen Therapy): ventilator  Vent Mode: A/C  Set Rate: 15 BPM  Vt Set: 400 mL  PEEP/CPAP: 5 cmH20  Peak Airway Pressure: 21 cmH2O  Mean Airway Pressure: 8.8 cmH20  Plateau Pressure: 0 cmH20    Temp  Min: 104.4 °F (40.2 °C)  Max: 104.4 °F (40.2 °C)  Pulse  Min: 99  Max: 126  BP  Min: 88/52  Max: 133/62  MAP (mmHg)  Min: 67  Max: 99  Resp  Min: 20  Max: 20  SpO2  Min: 96 %  Max: 100 %  Oxygen Concentration (%)  Min: 70  Max: 70    No intake/output data recorded.           Physical Exam  --sedation: none  --GCS: [id number]  --Mental Status: eyes closed no response to pain, no commands   --Pupils sluggish bilateral  --brainstem: only gag present  --Motor: no response to central pain. Triple flex in LLE to noxious stimuli intermittently  --sensory: see motor  --Reflexes: not tested  --Gait: deferred      Today I personally reviewed pertinent medications,  lines/drains/airways, imaging, cardiology results, laboratory results, microbiology results,

## 2022-05-16 NOTE — HPI
"Patient unable to provide history. HPI per ED provider notes- "Kavya Figueroa is a 76 year old female with history of ESRD on dialysis, HTN, GERD, MI who presents by ems from the nursing home. It is reported the patients last known normal was last night at unknown time and this am when the patient was assessed by staff she was not responsive. On arrival to the ED the patient was unresponsive and code stroke was called. Patient was subsequently intubated for airway protection prior to imaging following discussion with the patients daughter who confirmed the patient was a full code and she did want the patient to be intubated."    Patient was recently admitted in March for osteomyelitis and subsequently underwent amputation of L great toe. On assessment by vascular neurology the patient had a GCS of 3 and was in the process of being intubated. Initial imaging delayed due to need for emergent intubation and lack of vascular access. Gag reflex intact at time of initial assessment. NIH at that time was 28. /97. Last blood glucose was reportedly 300. She is reportedly on Eliquis although unconfirmed at this time. CTA demonstrates large intraparenchymal and intraventricular hemorrhage. Neurosurgery and neurocritical care consulted. Patient currently intubated as performed during course of treatment, but is now DNR per NCC discussion with family, with expected transition to comfort care due to patient's previously expressed wishes and poor prognosis.  "

## 2022-05-16 NOTE — CONSULTS
[facility]  Neurosurgery  Consult Note    Inpatient consult to Neurosurgery  Consult performed by: Richard Noble MD  Consult ordered by: Jaylon Quiñones MD        Subjective:     Chief Complaint/Reason for Admission: ICH    History of Present Illness: 76F nursing home resident h/o ESRD on iHD, HTN, GERD, MI, presented via EMS for AMS. LSN last night unknown time. Patient found this morning by nursing home staff encephalopathic. In ED, patient was unresponsive and code stroke was called. Patient was subsequently intubated for airway protection. Patient takes Eliquis per report.     CTH on presentation significant for large L thalamic ICH with IVH and acute HCP.         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    B12 deficiency 5/10/2021    Chronic diastolic congestive heart failure 12/18/2019    Dyslipidemia 3/31/2019    ESRD on hemodialysis 5/7/2021    Essential hypertension 3/31/2019    GERD (gastroesophageal reflux disease) 7/23/2021    History of myocardial infarction 7/23/2021    Secondary hyperparathyroidism of renal origin 3/31/2019    Type 2 diabetes mellitus, without long-term current use of insulin 3/31/2019     Past Surgical History:   Procedure Laterality Date    DECLOTTING OF VASCULAR GRAFT Left 5/26/2021    Procedure: DECLOT-GRAFT;  Surgeon: REYNALDO Watters II, MD;  Location: [address];  Service: Cardiovascular;  Laterality: Left;  10.5 min  59.97 mGy  9.4910 Gy.cm  20ml Dye    FISTULOTOMY      TOE AMPUTATION Left 3/31/2022    Procedure: AMPUTATION, TOE LEFT;  Surgeon: Yennifer Matias DPM;  Location: [address];  Service: Podiatry;  Laterality: Left;    VENOPLASTY Left 5/26/2021    Procedure: ANGIOPLASTY, VEIN;  Surgeon: REYNALDO Watters II, MD;  Location: [facility] OR [address] [place] [address];  Service: Cardiovascular;  Laterality: Left;   balloon angioplasty     Family History    None       Tobacco Use    Smoking status: Never Smoker    Smokeless tobacco: Never Used   Substance and Sexual Activity    Alcohol use: Never    Drug use: Never    Sexual activity: Not Currently     Review of Systems   Unable to perform ROS: Intubated   Objective:     Weight: 77.1 kg (170 lb)  Body mass index is 27.44 kg/m².  Vital Signs (Most Recent):  Temp: (!) 104.4 °F (40.2 °C) (05/16/22 1011)  Pulse: 110 (05/16/22 1101)  Resp: 20 (05/16/22 0855)  BP: 121/62 (05/16/22 1101)  SpO2: 100 % (05/16/22 1101)   Vital Signs (24h Range):  Temp:  [104.4 °F (40.2 °C)] 104.4 °F (40.2 °C)  Pulse:  [109-126] 110  Resp:  [20] 20  SpO2:  [96 %-100 %] 100 %  BP: ()/(52-92) 121/62     Date 05/16/22 0700 - 05/17/22 0659   Shift 0111-5500 6932-7661 6141-2844 24 Hour Total   INTAKE   IV Piggyback 100   100   Shift Total(mL/kg) 100(1.3)   100(1.3)   OUTPUT   Other 800   800   Shift Total(mL/kg) 800(10.4)   800(10.4)   Weight (kg) 77.1 77.1 77.1 77.1              Vent Mode: A/C  Oxygen Concentration (%):  [70] 70  Resp Rate Total:  [15 br/min] 15 br/min  Vt Set:  [400 mL] 400 mL  PEEP/CPAP:  [5 cmH20] 5 cmH20  Mean Airway Pressure:  [9.1 cmH20-9.2 cmH20] 9.1 cmH20         NG/OG Tube 05/16/22 0830 [place] sump 18 Fr. (Active)            Hemodialysis AV Fistula Left upper arm (Active)       Physical Exam    Neurosurgery Physical Exam    E1VtM1  Intubated, not sedated  RP/LP 5mm and fixed  No cough, corneals, or oculocephalic  No movement in extremities to stimulation    Significant Labs:  Recent Labs   Lab 05/16/22  0828   *      K 5.3*      CO2 22*   BUN 47*   CREATININE 7.9*   CALCIUM 8.4*     Recent Labs   Lab 05/16/22  0828   WBC 6.10   HGB 12.2   HCT 39.2        Recent Labs   Lab 05/16/22  0921   INR 1.4*     Microbiology Results (last 7 days)       Procedure Component Value Units Date/Time    Blood culture x two cultures. Draw prior to antibiotics.  [[id number]] Collected: 05/16/22 0921    Order Status: Sent Specimen: Blood from Line, Femoral, Right Updated: 05/16/22 0938    Blood culture x two cultures. Draw prior to antibiotics. [[id number]] Collected: 05/16/22 0921    Order Status: Sent Specimen: Blood from Line, Femoral, Right Updated: 05/16/22 0938          All pertinent labs from the last 24 hours have been reviewed.    Significant Diagnostics:  I have reviewed all pertinent imaging results/findings within the past 24 hours.  X-Ray Chest AP Portable    Result Date: 5/16/2022  1. The endotracheal tube position as noted and should be retracted 2.5 cm. 2. Position of enteric tube tip and side port hole as noted and should be further advanced into the stomach 3-5 cm. 3. Development bilateral perihilar and lower lung zone interstitial opacities as noted. The findings and recommendations as noted above were personally reviewed with Dr. Jaylon Quiñones by Dr. Mooney at 09:41 May 16, 2022 Electronically signed by: Barrett Mooney Date:    05/16/2022 Time:    09:42    X-Ray Pelvis Routine AP    Result Date: 5/16/2022  As above. Electronically signed by: Barrett Mooney Date:    05/16/2022 Time:    09:51    CTA STROKE MULTI-PHASE    Result Date: 5/16/2022  Large hemorrhage with surrounding edema involving the left thalamus and midbrain with intraventricular extension as detailed above.  Largest volume of blood is centered in the 3rd ventricle measuring up to 3.3 cm in transverse dimension Enlargement of the ventricular system with surrounding periventricular edema concerning for acute obstructive hydrocephalus. Trace subarachnoid hemorrhage along the left medial parietal lobe. Chronic microvascular ischemic change. Endotracheal tube is visualized, with tip at the right mainstem bronchus origin.  Retraction of the endotracheal tube is recommended. Trace right pleural effusion as well as patchy heterogeneous opacities throughout both lungs with right upper lobe predominance.   Findings are nonspecific, may represent infection or non infectious inflammatory process, with aspiration also a consideration. No aneurysm or vascular malformation. Mixed calcific and atheromatous atherosclerosis involving the carotid bulbs with approximately 30% stenosis at the right carotid bifurcation and approximately 50 stenosis at the left carotid bifurcation. Other findings as above. COMMUNICATION This critical result was discovered/received at 09:45.  The critical information above was relayed directly by me by telephone to Dr. Quiñones with emergency medicine on 05/16/2022 at 09:50. Electronically signed by resident: Avila Alvarez Date:    05/16/2022 Time:    09:46 Electronically signed by: Bakari Amaro MD Date:    05/16/2022 Time:    11:25       Assessment/Plan:     IVH (intraventricular hemorrhage)  76F nursing home resident h/o ESRD on iHD, HTN, GERD, MI, presented via EMS with L thalamic IOCH (ICH score 3). We had a thorough discussion with the patient's daughter regarding the prognosis. She stated that the patient would not want any intervention given the low chance of a meaningful recovery.      --Patient admitted to [facility] on telemetry      -q1h neurochecks in ICU, q2h neurochecks in stepdown, q4h neurochecks on floor  --All labs and diagnostics reviewed  --CTH with large hemorrhage with surrounding edema involving the left thalamus and midbrain with intraventricular extension. Enlargement of the ventricular system with surrounding periventricular edema concerning for acute obstructive hydrocephalus. --Reverse anti-plt/coag medications per [facility].  --Medical management per [facility].  --No NSY intervention given pt's wishes. Accordingly, NSY will sign off. Please page with questions. Thank you for this interesting consult.     Dispo: Admit to NCC; NSY signing off.           Thank you for your consult. I will sign off. Please contact us if you have any additional questions.    Richard Noble,  MD  Neurosurgery  [facility]

## 2022-05-17 PROBLEM — Z71.89 GOALS OF CARE, COUNSELING/DISCUSSION: Status: ACTIVE | Noted: 2021-05-07

## 2022-05-17 NOTE — PROGRESS NOTES
"  [facility] - Neuro Critical Care  Adult Nutrition  Consult Note    SUMMARY     Recommendations  1.If able to extubate recommend ADAT Cardiac/diabetic diet, texture per SLP     2. If unable to extubate recommend Novasorce renal @ 35mL/hr to provide 1680 kcal, 76 grams protein and  602 mL free water additional water per MD.    3. RD following    Goals: Pt to receive nutrition by RD follow up  Nutrition Goal Status: new    Assessment and Plan    Nutrition Problem  Inadequate oral intake     Related to (etiology):   Inability to consume sufficient energy/protein needs     Signs and Symptoms (as evidenced by):   NPO    Interventions/Recommendations (treatment strategy):  Collaboration of nutrition care with other providers   Enteral nutrition    Nutrition Diagnosis Status:   New    Reason for Assessment    Reason For Assessment: consult  Diagnosis: stroke/CVA (intraventricular hemorrhage)  Relevant Medical History: ESRD on dialysis, HTN, GERD, MI  Interdisciplinary Rounds: did not attend  General Information Comments: Pt intubated and sedated. Spoke with daughter in room. Daughter reported no weight changes prior to admit. Normal appetite. Pt appears nourished NFPE not warranted at this time.  Nutrition Discharge Planning: Pending Clinical Course       Nutrition Risk Screen    Nutrition Risk Screen: no indicators present    Anthropometrics    Temp: 96.8 °F (36 °C)  Height Method: Estimated  Height: 5' 6" (167.6 cm)  Height (inches): 66 in  Weight Method: Bed Scale  Weight: 77.1 kg (169 lb 15.6 oz)  Weight (lb): 169.98 lb  Ideal Body Weight (IBW), Female: 130 lb  % Ideal Body Weight, Female (lb): 130.75 %  BMI (Calculated): 27.4       Lab/Procedures/Meds    Pertinent Labs Reviewed: reviewed  Pertinent Labs Comments: CO2 20L, Anion Gap 23 H, BUN 57 H, Creatinine 8.6 H, GFR 4.6, Glucose 147 H, Phosphorus 6.1 H, Albumin 3.3 L, ALT <5 L   Pertinent Medications Reviewed: reviewed  Pertinent Medications Comments: reviewed "       Estimated/Assessed Needs    Weight Used For Calorie Calculations: 77.1 kg (169 lb 15.6 oz)  Energy Calorie Requirements (kcal): 1640kcal/day  Energy Need Method: [facility]  Protein Requirements: 93-108g/day (1.2-1.4 g/kg)  Weight Used For Protein Calculations: 77.1 kg (169 lb 15.6 oz)  Fluid Requirements (mL): 1 mL/Kcal or per MD  Estimated Fluid Requirement Method: RDA Method  RDA Method (mL): 1640         Nutrition Prescription Ordered    Current Diet Order: NPO    Evaluation of Received Nutrient/Fluid Intake       % Intake of Estimated Energy Needs: 0 - 25 %  % Meal Intake: NPO    Nutrition Risk    Level of Risk/Frequency of Follow-up: low       Monitor and Evaluation    Food and Nutrient Intake: energy intake, food and beverage intake, enteral nutrition intake  Food and Nutrient Adminstration: diet order, enteral and parenteral nutrition administration  Knowledge/Beliefs/Attitudes: food and nutrition knowledge/skill  Physical Activity and Function: nutrition-related ADLs and IADLs  Anthropometric Measurements: weight, weight change, body mass index  Biochemical Data, Medical Tests and Procedures: electrolyte and renal panel, gastrointestinal profile, glucose/endocrine profile, inflammatory profile, lipid profile  Nutrition-Focused Physical Findings: overall appearance       Nutrition Follow-Up    RD Follow-up?: Yes     Aylin GARDUNO

## 2022-05-17 NOTE — PLAN OF CARE
[facility] Critical [facility]  Discharge Assessment    Primary Care Provider: Neeru Benites MD     Per MD: Code status was made DNR with plans to start full comfort care measures this evening after the rest of the family comes and sees her.    Discharge Planning Assessment per chart.     Discharge Assessment (most recent)     BRIEF DISCHARGE ASSESSMENT - 05/17/22 1530        Discharge Planning    Assessment Type Discharge Planning Brief Assessment     Resource/Environmental Concerns none     Support Systems Children;Family members     Equipment Currently Used at Home other (see comments)   joselito    Current Living Arrangements home/apartment/condo     Patient/Family Anticipates Transition to other (see comments)   joselito    Patient/Family Anticipated Services at Transition other (see comments)   tbd    DME Needed Upon Discharge  none     Discharge Plan A Comfort care/withdrawal     Discharge Plan B Comfort care/withdrawal               Alysha Call RN, CCRN-K, [facility]  Neuro-Critical Care   [phone]

## 2022-05-17 NOTE — PROGRESS NOTES
[facility] Critical Care  Vascular Neurology  Comprehensive Stroke Center  Progress Note    Assessment/Plan:     * IVH (intraventricular hemorrhage)  76 year old female with history of ESRD on dialysis, HTN, GERD, MI who presents by ems from the nursing home. It is reported the patients last known normal was last night at unknown time and this am when the patient was assessed by staff she was not responsive. On arrival to the ED the patient was unresponsive and code stroke was called. On assessment by vascular neurology the patient had a GCS of 3 and was in the process of being intubated. Initial imaging delayed due to need for emergent intubation and lack of vascular access. NIH at that time (after administration of sedative) was 28. /97. Last blood glucose was reportedly 300. She is reportedly on Eliquis although unconfirmed at this time. ED team aware and began process of working towards reversal. CTA demonstrates large intraparenchymal and intraventricular hemorrhage. Neurosurgery and neurocritical care consulted. Patient currently intubated, as performed during course of treatment, but is now DNR per NCC discussion with family.    Neuro exam unchanged, prognosis remains poor. Goals of care discussed with family, will be transitioned to comfort care this evening. Vascular neurology will sign off.       Antithrombotics for secondary stroke prevention: Antiplatelets: None: Intracerebral Hemorrhage    Statins for secondary stroke prevention and hyperlipidemia, if present:   Statins: None: Reason: Transitioning to comfort care    Aggressive risk factor modification: HTN, DM, HLD, Diet, Exercise     Rehab efforts: The patient has been evaluated by a stroke team provider and the therapy needs have been fully considered based off the presenting complaints and exam findings. The following therapy evaluations are needed: None- transitioning to comfort care    Diagnostics ordered/pending: None -transitioning  to comfort care    VTE prophylaxis: Mechanical prophylaxis: Place SCDs  None: Reason for No Pharmacological VTE Prophylaxis: History of systemic or intracranial bleeding    BP parameters: ICH: SBP <160        Goals of care, counseling/discussion  Goals of care discussed with family  Will be transitioning to comfort care this evening         05/17/2022 Patient transitioning to comfort care this evening due to poor prognosis. Neuro exam unchanged, poor prognosis due to IVH. Vascular neurology will sign off.         STROKE DOCUMENTATION   Acute Stroke Times   Last Known Normal Date: 05/15/22  Unknown Normal Time: Unknown Time  Symptom Onset Date: 05/16/22  Unknown Symptom Onset Time: Unknown Time  Stroke Team Called Date: 05/16/22  Stroke Team Called Time: 0815  Stroke Team Arrival Date: 05/16/22  Stroke Team Arrival Time: 0818  CT Interpretation Time: 0930  Alteplase Recommended: No  CTA Interpretation Time: 0930  Thrombectomy Recommended: No    NIH Scale:  1a. Level of Consciousness: 3-->Responds only with reflex motor or autonomic effects or totally unresponsive, flaccid, and areflexic  1b. LOC Questions: 2-->Answers neither question correctly  1c. LOC Commands: 2-->Performs neither task correctly  2. Best Gaze: 0-->Normal  3. Visual: 0-->No visual loss  4. Facial Palsy: 0-->Normal symmetrical movements  5a. Motor Arm, Left: 4-->No movement  5b. Motor Arm, Right: 4-->No movement  6a. Motor Leg, Left: 4-->No movement  6b. Motor Leg, Right: 4-->No movement  7. Limb Ataxia: 0-->Absent  8. Sensory: 0-->Normal, no sensory loss  9. Best Language: 3-->Mute, global aphasia, no usable speech or auditory comprehension  10. Dysarthria: (UN) Intubated or other physical barrier  11. Extinction and Inattention (formerly Neglect): 0-->No abnormality  Total (NIH Stroke Scale): 26       Modified Tinnie Score: 5  [place] Coma Scale:    ABCD2 Score:    [id number] Score:   HAS -BLED Score:   ICH Score:   Hunt & Ngo  Classification:Grade V     Hemorrhagic change of an Ischemic Stroke: Does this patient have an ischemic stroke with hemorrhagic changes? No     Neurologic Chief Complaint: ICH    Subjective:     Interval History: Patient is seen for follow-up neurological assessment and treatment recommendations:     Patient transitioning to comfort care this evening due to poor prognosis. Neuro exam unchanged, poor prognosis due to IVH. Vascular neurology will sign off.     HPI, Past Medical, Family, and Social History remains the same as documented in the initial encounter.     Review of Systems   Unable to perform ROS: Intubated   Scheduled Meds:  Continuous Infusions:   NORepinephrine bitartrate-D5W 0.04 mcg/kg/min (05/17/22 1500)     PRN Meds:dextrose 10%, dextrose 10%, glucagon (human recombinant), insulin aspart U-100, labetalol, sodium chloride 0.9%, sodium chloride 0.9%    Objective:     Vital Signs (Most Recent):  Temp: 96.62 °F (35.9 °C) (05/17/22 1145)  Pulse: (!) 54 (05/17/22 1515)  Resp: 15 (05/17/22 1515)  BP: (!) 112/55 (05/17/22 1515)  SpO2: 100 % (05/17/22 1515)  BP Location: Right arm    Vital Signs Range (Last 24H):  Temp:  [96.62 °F (35.9 °C)-100.8 °F (38.2 °C)]   Pulse:  [53-85]   Resp:  [5-21]   BP: ()/(41-85)   SpO2:  [100 %]   BP Location: Right arm    Physical Exam  Vitals and nursing note reviewed.   Constitutional:       Appearance: She is ill-appearing.      Interventions: She is intubated.   HENT:      Head: Normocephalic.      Mouth/Throat:      Mouth: Mucous membranes are moist.   Cardiovascular:      Rate and Rhythm: Normal rate.   Pulmonary:      Effort: She is intubated.   Abdominal:      General: There is no distension.   Musculoskeletal:         General: No deformity or signs of injury.   Skin:     General: Skin is warm.   Neurological:      Mental Status: She is unresponsive.      Motor: No tremor or seizure activity.      Comments: Limited exam, patient intubated  Does not follow  commands   Responds only to painful stimuli       Neurological Exam:   LOC: comatose  Attention Span: poor  Language: Intubated  Orientation: Untestable due to intubation  Motor: Arm left  Plegia 0/5  Leg left  Plegia 0/5  Arm right  Plegia 0/5  Leg right Plegia 0/5  Sensation: responds to painful stimuli    Laboratory:  CMP:   Recent Labs   Lab 05/17/22  0415   CALCIUM 9.3   ALBUMIN 3.3*   PROT 7.4      K 4.9   CO2 20*      BUN 57*   CREATININE 8.6*   ALKPHOS 99   ALT <5*   AST 13   BILITOT 0.9     CBC:   Recent Labs   Lab 05/17/22  0415   WBC 18.85*   RBC 4.60   HGB 13.1   HCT 44.5      MCV 97   MCH 28.5   MCHC 29.4*     Lipid Panel:   Recent Labs   Lab 05/16/22  0828   CHOL 148   LDLCALC 89.6   HDL 43   TRIG 77     Hgb A1C:   Recent Labs   Lab 05/16/22  0921   HGBA1C 5.8*     TSH:   Recent Labs   Lab 05/16/22  0828   TSH 1.393       Diagnostic Results     Brain/Vessel Imaging   CTA MP 5/16/22  -Large hemorrhage with surrounding edema involving the left thalamus and midbrain with intraventricular extension as detailed above.  Largest volume of blood is centered in the 3rd ventricle measuring up to 3.3 cm in transverse dimension  -Enlargement of the ventricular system with surrounding periventricular edema concerning for acute obstructive hydrocephalus.  -Trace subarachnoid hemorrhage along the left medial parietal lobe.  -Chronic microvascular ischemic change.  -Endotracheal tube is visualized, with tip at the right mainstem bronchus origin.  Retraction of the endotracheal tube is recommended.  -Trace right pleural effusion as well as patchy heterogeneous opacities throughout both lungs with right upper lobe predominance.  Findings are nonspecific, may represent infection or non infectious inflammatory process, with aspiration also a consideration.  -No aneurysm or vascular malformation.  -Mixed calcific and atheromatous atherosclerosis involving the carotid bulbs with approximately 30% stenosis  at the right carotid bifurcation and approximately 50 stenosis at the left carotid bifurcation.      Cardiac Imaging   N/A      JUAN FRANCISCO Vann  [facility] Stroke Center  Department of Vascular Neurology   [facility]

## 2022-05-17 NOTE — HOSPITAL COURSE
05/17/2022 Patient transitioning to comfort care this evening due to poor prognosis. Neuro exam unchanged, poor prognosis due to IVH. Vascular neurology will sign off.

## 2022-05-17 NOTE — ED NOTES
Accucheck run on blood from fem line - 180.  CBG from R ring finger - 280.  CBG from L ring finger - 297.

## 2022-05-17 NOTE — PLAN OF CARE
[facility] Care Plan    POC reviewed with Kavya Pelaez Henry and family at 1300. Questions and concerns addressed.  Daughter reports there are family members from out of town that wish to come to bedside. They are expecting their  this evening and wish to pray with him as a family. Will continue to monitor. See below and flowsheets for full assessment and VS info.             Is this a stroke patient? yes- Stroke booklet reviewed with family, risk factors identified for patient and stroke booklet remains at bedside for ongoing education.     Neuro:  Valerie Coma Scale  Best Eye Response: 1-->(E1) none  Best Motor Response: 4-->(M4) withdraws from pain  Best Verbal Response: 1-->(V1) none  Valerie Coma Scale Score: 6  Assessment Qualifiers: patient intubated  Pupil PERRLA: no     24 hr Temp:  [96.62 °F (35.9 °C)-101.1 °F (38.4 °C)]     CV:   Rhythm: normal sinus rhythm, sinus bradycardia  BP goals:   SBP < 160  MAP > 65    Resp:   O2 Device (Oxygen Therapy): ventilator  Vent Mode: A/C  Set Rate: 15 BPM  Oxygen Concentration (%): 60  Vt Set: 450 mL  PEEP/CPAP: 5 cmH20    Plan:  Plan is to transition to comfort care after family members from out of town are able to visit.    GI/:     Diet/Nutrition Received: NPO  Last Bowel Movement: 05/17/22  Voiding Characteristics: oliguria    Intake/Output Summary (Last 24 hours) at 5/17/2022 1405  Last data filed at 5/17/2022 1300  Gross per 24 hour   Intake 1308.05 ml   Output 0 ml   Net 1308.05 ml     Unmeasured Output  Stool Occurrence: 1    Labs/Accuchecks:  Recent Labs   Lab 05/17/22 0415   WBC 18.85*   RBC 4.60   HGB 13.1   HCT 44.5         Recent Labs   Lab 05/17/22 0415      K 4.9   CO2 20*      BUN 57*   CREATININE 8.6*   ALKPHOS 99   ALT <5*   AST 13   BILITOT 0.9      Recent Labs   Lab 05/17/22 0415   INR 1.2   APTT 31.1      Recent Labs   Lab 05/16/22  1941   TROPONINI 0.273*       Electrolytes: No replacement orders  Accuchecks:  Q6H    Gtts:   NORepinephrine bitartrate-D5W 0.06 mcg/kg/min (05/17/22 1300)       LDA/Wounds:  Lines/Drains/Airways       Central Venous Catheter Line  Duration             Percutaneous Central Line Insertion/Assessment - Triple Lumen  05/16/22 0910 right femoral vein 1 day              Drain  Duration                  Hemodialysis AV Fistula Left upper arm -- days         NG/OG Tube 05/16/22 0830 [place] sump 18 Fr. 1 day              Airway  Duration                  Airway - Non-Surgical 05/16/22 0828 Endotracheal Tube 1 day                  Wounds: Yes  Wound care consulted: No

## 2022-05-17 NOTE — SUBJECTIVE & OBJECTIVE
Interval History:  >4 elements OR status of 3 inpatient conditions    Review of Systems   Unable to perform ROS: Intubated   2 systems OR Unable to obtain a complete ROS due to level of consciousness.  Objective:     Vitals:  Temp: 96.62 °F (35.9 °C)  Pulse: 63  Rhythm: normal sinus rhythm  BP: (!) 113/56  MAP (mmHg): 81  Resp: 15  SpO2: 100 %  Oxygen Concentration (%): 60  O2 Device (Oxygen Therapy): ventilator  Vent Mode: A/C  Set Rate: 15 BPM  Vt Set: 450 mL  PEEP/CPAP: 5 cmH20  Peak Airway Pressure: 21 cmH2O  Mean Airway Pressure: 8.7 cmH20  Plateau Pressure: 0 cmH20    Temp  Min: 96.62 °F (35.9 °C)  Max: 101.3 °F (38.5 °C)  Pulse  Min: 54  Max: 91  BP  Min: 86/46  Max: 159/70  MAP (mmHg)  Min: 59  Max: 112  Resp  Min: 5  Max: 21  SpO2  Min: 100 %  Max: 100 %  Oxygen Concentration (%)  Min: 40  Max: 70    05/16 0701 - 05/17 0700  In: 2475.6 [I.V.:727.3]  Out: 800    Unmeasured Output  Stool Occurrence: 1       Physical Exam  Constitutional: No apparent distress.   Eyes: Conjunctiva clear, anicteric. Lids no lesions.  Head/Ears/Nose/Mouth/Throat/Neck: Moist mucous membranes. External ears, nose atraumatic.   Cardiovascular: Regular rhythm. WSM  Respiratory: diffuse crackling   Gastrointestinal: No hernia. Soft. + bowel sounds.        -Mental Status: comatose  -Cranial nerves: Pupils are non reactive to lights. Intact corneal reflexes bilateral. Intact cough reflex   -Motor: to noxious stimuli, she extend uppers and TF lowers.       Medications:  ContinuousNORepinephrine bitartrate-D5W, Last Rate: 0.08 mcg/kg/min (05/17/22 1200)    Scheduled PRNdextrose 10%, 12.5 g, PRN  dextrose 10%, 25 g, PRN  glucagon (human recombinant), 1 mg, PRN  insulin aspart U-100, 1-10 Units, Q6H PRN  labetalol, 10 mg, Q15 Min PRN  sodium chloride 0.9%, 10 mL, PRN  sodium chloride 0.9%, 10 mL, PRN      Today I personally reviewed pertinent medications, lines/drains/airways, imaging, laboratory results, notably:    Diet  Diet NPO  Diet  NPO

## 2022-05-17 NOTE — PROGRESS NOTES
Therapy with vancomycin complete and consult discontinued by provider. Pharmacy will sign off, please re-consult as needed.    Priti Morton, PharmD  Neurocritical Care Pharmacist  [phone]

## 2022-05-17 NOTE — PROGRESS NOTES
[facility]  Neurocritical Care  Progress Note    Admit Date: 5/16/2022  Service Date: 05/17/2022  Length of Stay: 1    Subjective:     Chief Complaint: IVH (intraventricular hemorrhage)    History of Present Illness: Kavya Figueroa is a 76 year old female with history of ESRD on dialysis, HTN, GERD, MI who presents by ems from the nursing home for AMS, found to have ICH/IVH. Patient nonresponsive unable to provide Hx, lives in nursing home, family at bedside.  Per charts: patient was last known normal last night at unknown time and this am when the patient was assessed by staff she was not responsive. On arrival to the ED the patient was unresponsive and code stroke was called. Patient was subsequently intubated for airway protection prior to imaging following discussion with the patients daughter who confirmed the patient was a full code and she did want the patient to be intubated. CTH with large ICH/IVH, poor exam.      Hospital Course: 05/17/2022 had a family meeting today. Please see assessment and plan       Interval History:  >4 elements OR status of 3 inpatient conditions    Review of Systems   Unable to perform ROS: Intubated   2 systems OR Unable to obtain a complete ROS due to level of consciousness.  Objective:     Vitals:  Temp: 96.62 °F (35.9 °C)  Pulse: 63  Rhythm: normal sinus rhythm  BP: (!) 113/56  MAP (mmHg): 81  Resp: 15  SpO2: 100 %  Oxygen Concentration (%): 60  O2 Device (Oxygen Therapy): ventilator  Vent Mode: A/C  Set Rate: 15 BPM  Vt Set: 450 mL  PEEP/CPAP: 5 cmH20  Peak Airway Pressure: 21 cmH2O  Mean Airway Pressure: 8.7 cmH20  Plateau Pressure: 0 cmH20    Temp  Min: 96.62 °F (35.9 °C)  Max: 101.3 °F (38.5 °C)  Pulse  Min: 54  Max: 91  BP  Min: 86/46  Max: 159/70  MAP (mmHg)  Min: 59  Max: 112  Resp  Min: 5  Max: 21  SpO2  Min: 100 %  Max: 100 %  Oxygen Concentration (%)  Min: 40  Max: 70    05/16 0701 - 05/17 0700  In: 2475.6 [I.V.:727.3]  Out: 800    Unmeasured  Output  Stool Occurrence: 1       Physical Exam  Constitutional: No apparent distress.   Eyes: Conjunctiva clear, anicteric. Lids no lesions.  Head/Ears/Nose/Mouth/Throat/Neck: Moist mucous membranes. External ears, nose atraumatic.   Cardiovascular: Regular rhythm. WSM  Respiratory: diffuse crackling   Gastrointestinal: No hernia. Soft. + bowel sounds.        -Mental Status: comatose  -Cranial nerves: Pupils are non reactive to lights. Intact corneal reflexes bilateral. Intact cough reflex   -Motor: to noxious stimuli, she extend uppers and TF lowers.       Medications:  ContinuousNORepinephrine bitartrate-D5W, Last Rate: 0.08 mcg/kg/min (05/17/22 1200)    Scheduled PRNdextrose 10%, 12.5 g, PRN  dextrose 10%, 25 g, PRN  glucagon (human recombinant), 1 mg, PRN  insulin aspart U-100, 1-10 Units, Q6H PRN  labetalol, 10 mg, Q15 Min PRN  sodium chloride 0.9%, 10 mL, PRN  sodium chloride 0.9%, 10 mL, PRN      Today I personally reviewed pertinent medications, lines/drains/airways, imaging, laboratory results, notably:    Diet  Diet NPO  Diet NPO          Assessment/Plan:     Neuro  * IVH (intraventricular hemorrhage)  Poor neurological exam  See goals of care discussion         Palliative Care  Goals of care, counseling/discussion  The patient is unable to participate due to coma.    I met with Ms. Figueroa's  family, including 2 daughters and a son in law at bedside.     This meeting included discussion of the diagnosis, prognosis, and goals of care, was absolutely necessary for treatment decisions, and bore directly on the management of the patient.    I explained to them the diagnosis and prognosis.  Giving her current neurological exam, CTH findings and poor baseline neurological status she is not expected to have a meaningful recovery. The family was cl;easr about her wishes and that she doesn't want be kept on life support. The family would like to concentrate on the comfort of the patient. Code status was made  DNR with plans to start full comfort care measures this evening after the rest of the family come and see her.                The patient is being Prophylaxed for:  Venous Thromboembolism with: Mechanical  Stress Ulcer with: H2B  Ventilator Pneumonia with: chlorhexidine oral care    Activity Orders          Turn patient starting at 05/16 1400    Elevate HOB starting at 05/16 1312    Diet NPO: NPO starting at 05/16 1312        DNR    Uninterrupted Critical Care/Counseling Time (not including procedures): 45 minutes     Elisabeth Alford MD  Neurocritical Care  [facility] - Neuro Critical Care

## 2022-05-17 NOTE — HOSPITAL COURSE
05/17/2022 had a family meeting today. Please see assessment and plan \  05/18/2022: continue comfort care measures, pending hospice in coordination with case management  05/19/2022: continue comfort care measures

## 2022-05-17 NOTE — ASSESSMENT & PLAN NOTE
76 year old female with history of ESRD on dialysis, HTN, GERD, MI who presents by ems from the nursing home. It is reported the patients last known normal was last night at unknown time and this am when the patient was assessed by staff she was not responsive. On arrival to the ED the patient was unresponsive and code stroke was called. On assessment by vascular neurology the patient had a GCS of 3 and was in the process of being intubated. Initial imaging delayed due to need for emergent intubation and lack of vascular access. NIH at that time (after administration of sedative) was 28. /97. Last blood glucose was reportedly 300. She is reportedly on Eliquis although unconfirmed at this time. ED team aware and began process of working towards reversal. CTA demonstrates large intraparenchymal and intraventricular hemorrhage. Neurosurgery and neurocritical care consulted. Patient currently intubated, as performed during course of treatment, but is now DNR per NCC discussion with family.    Neuro exam unchanged, prognosis remains poor. Goals of care discussed with family, will be transitioned to comfort care this evening. Vascular neurology will sign off.       Antithrombotics for secondary stroke prevention: Antiplatelets: None: Intracerebral Hemorrhage    Statins for secondary stroke prevention and hyperlipidemia, if present:   Statins: None: Reason: Transitioning to comfort care    Aggressive risk factor modification: HTN, DM, HLD, Diet, Exercise     Rehab efforts: The patient has been evaluated by a stroke team provider and the therapy needs have been fully considered based off the presenting complaints and exam findings. The following therapy evaluations are needed: None- transitioning to comfort care    Diagnostics ordered/pending: None -transitioning to comfort care    VTE prophylaxis: Mechanical prophylaxis: Place SCDs  None: Reason for No Pharmacological VTE Prophylaxis: History of systemic or  intracranial bleeding    BP parameters: ICH: SBP <160

## 2022-05-17 NOTE — PLAN OF CARE
[facility] Care Plan    POC reviewed with Kavya Figueroa and family at 0300. Pt unable to verbalize understanding. No acute events overnight. Pt progressing toward goals. Will continue to monitor. See below and flowsheets for full assessment and VS info.     -Levo gtt initiated   -Insulin gtt off  -Wound care provided   -Positive anaerobic blood cultures resulted: Gram negative rods and positive cocci. JUAN FRANCISCO Monae informed.  -Bath given and linens changed      Is this a stroke patient? yes- Stroke booklet reviewed with patient, risk factors identified for patient and stroke booklet remains at bedside for ongoing education.     Neuro:  Valerie Coma Scale  Best Eye Response: 1-->(E1) none  Best Motor Response: 4-->(M4) withdraws from pain  Best Verbal Response: 1-->(V1) none  [place] Coma Scale Score: 6  Assessment Qualifiers: patient intubated, patient not sedated/intubated  Pupil PERRLA: no     24hr Temp:  [97.34 °F (36.3 °C)-104.4 °F (40.2 °C)]     CV:   Rhythm: normal sinus rhythm  BP goals:   SBP < 160  MAP > 65    Resp:   O2 Device (Oxygen Therapy): ventilator  Vent Mode: A/C  Set Rate: 15 BPM  Oxygen Concentration (%): 60  Vt Set: 400 mL  PEEP/CPAP: 5 cmH20    Plan:  Continue to monitor    GI/:     Diet/Nutrition Received: NPO  Last Bowel Movement: 05/16/22       Intake/Output Summary (Last 24 hours) at 5/17/2022 0502  Last data filed at 5/17/2022 0405  Gross per 24 hour   Intake 2368.7 ml   Output 800 ml   Net 1568.7 ml     Unmeasured Output  Stool Occurrence: 0    Labs/Accuchecks:  Recent Labs   Lab 05/16/22  0828   WBC 6.10   RBC 4.22   HGB 12.2   HCT 39.2         Recent Labs   Lab 05/16/22  0828      K 5.3*   CO2 22*      BUN 47*   CREATININE 7.9*   ALKPHOS 80   ALT <5*   AST 13   BILITOT 0.7      Recent Labs   Lab 05/16/22  0921   INR 1.4*      Recent Labs   Lab 05/16/22  1941   TROPONINI 0.273*       Electrolytes: No replacement orders  Accuchecks: Q1H    Gtts:   fentanyl Stopped  (05/16/22 1153)    insulin regular 1 units/mL infusion orderable (DKA) Stopped (05/16/22 1395)    NORepinephrine bitartrate-D5W 0.12 mcg/kg/min (05/17/22 0405)       LDA/Wounds:  Lines/Drains/Airways       Central Venous Catheter Line  Duration             Percutaneous Central Line Insertion/Assessment - Triple Lumen  05/16/22 0910 right femoral vein <1 day              Drain  Duration                  Hemodialysis AV Fistula Left upper arm -- days         NG/OG Tube 05/16/22 0830 [place] sump 18 Fr. <1 day         Urethral Catheter 05/16/22 1140 Temperature probe 16 Fr. <1 day              Airway  Duration                  Airway - Non-Surgical 05/16/22 0828 Endotracheal Tube <1 day              Peripheral Intravenous Line  Duration                  Peripheral IV - Single Lumen 05/16/22 0000 22 G Right Hand 1 day                  Wounds: Yes  Wound care consulted: Yes

## 2022-05-17 NOTE — PROGRESS NOTES
Patient arrived to [facility] from [facility] via [id number]    Type of stroke/diagnosis:  IVH    Current symptoms: intubated, withdraws bilateral lower extremities, extensor posturing bilateral upper extremities, positive cough reflex, positive corneal reflex, minimal gag reflex, fixed pupils bilaterally    Skin assessment done: Y  Wounds noted: left great toe, stitches to left upper extremity, multiple scabs bilateral lower extremities, stage 1/2 to sacrum, dermatitis to interior groin, rt shoulder dialysis catheter    *If wounds noted, was Wound Care consulted? Y    [place] Completed? N, pt intubated    Patient Belongings on Admit: none    NCC notified: JUAN FRANCISCO Dos Santos

## 2022-05-17 NOTE — PLAN OF CARE
Recommendations  1.If able to extubate recommend ADAT Cardiac/diabetic diet, texture per SLP      2. If unable to extubate recommend Novasorce renal @ 35mL/hr to provide 1680 kcal, 76 grams protein and  602 mL free water additional water per MD.     3. RD following     Goals: Pt to receive nutrition by RD follow up  Nutrition Goal Status: new

## 2022-05-17 NOTE — ASSESSMENT & PLAN NOTE
The patient is unable to participate due to coma.    I met with Ms. Figueroa's  family, including 2 daughters and a son in law at bedside.     This meeting included discussion of the diagnosis, prognosis, and goals of care, was absolutely necessary for treatment decisions, and bore directly on the management of the patient.    I explained to them the diagnosis and prognosis.  Giving her current neurological exam, CTH findings and poor baseline neurological status she is not expected to have a meaningful recovery. The family was cl;easr about her wishes and that she doesn't want be kept on life support. The family would like to concentrate on the comfort of the patient. Code status was made DNR with plans to start full comfort care measures this evening after the rest of the family come and see her.

## 2022-05-17 NOTE — SUBJECTIVE & OBJECTIVE
Neurologic Chief Complaint: ICH    Subjective:     Interval History: Patient is seen for follow-up neurological assessment and treatment recommendations:     Patient transitioning to comfort care this evening due to poor prognosis. Neuro exam unchanged, poor prognosis due to IVH. Vascular neurology will sign off.     HPI, Past Medical, Family, and Social History remains the same as documented in the initial encounter.     Review of Systems   Unable to perform ROS: Intubated   Scheduled Meds:  Continuous Infusions:   NORepinephrine bitartrate-D5W 0.04 mcg/kg/min (05/17/22 1500)     PRN Meds:dextrose 10%, dextrose 10%, glucagon (human recombinant), insulin aspart U-100, labetalol, sodium chloride 0.9%, sodium chloride 0.9%    Objective:     Vital Signs (Most Recent):  Temp: 96.62 °F (35.9 °C) (05/17/22 1145)  Pulse: (!) 54 (05/17/22 1515)  Resp: 15 (05/17/22 1515)  BP: (!) 112/55 (05/17/22 1515)  SpO2: 100 % (05/17/22 1515)  BP Location: Right arm    Vital Signs Range (Last 24H):  Temp:  [96.62 °F (35.9 °C)-100.8 °F (38.2 °C)]   Pulse:  [53-85]   Resp:  [5-21]   BP: ()/(41-85)   SpO2:  [100 %]   BP Location: Right arm    Physical Exam  Vitals and nursing note reviewed.   Constitutional:       Appearance: She is ill-appearing.      Interventions: She is intubated.   HENT:      Head: Normocephalic.      Mouth/Throat:      Mouth: Mucous membranes are moist.   Cardiovascular:      Rate and Rhythm: Normal rate.   Pulmonary:      Effort: She is intubated.   Abdominal:      General: There is no distension.   Musculoskeletal:         General: No deformity or signs of injury.   Skin:     General: Skin is warm.   Neurological:      Mental Status: She is unresponsive.      Motor: No tremor or seizure activity.      Comments: Limited exam, patient intubated  Does not follow commands   Responds only to painful stimuli       Neurological Exam:   LOC: comatose  Attention Span: poor  Language: Intubated  Orientation: Untestable  due to intubation  Motor: Arm left  Plegia 0/5  Leg left  Plegia 0/5  Arm right  Plegia 0/5  Leg right Plegia 0/5  Sensation: responds to painful stimuli    Laboratory:  CMP:   Recent Labs   Lab 05/17/22 0415   CALCIUM 9.3   ALBUMIN 3.3*   PROT 7.4      K 4.9   CO2 20*      BUN 57*   CREATININE 8.6*   ALKPHOS 99   ALT <5*   AST 13   BILITOT 0.9     CBC:   Recent Labs   Lab 05/17/22 0415   WBC 18.85*   RBC 4.60   HGB 13.1   HCT 44.5      MCV 97   MCH 28.5   MCHC 29.4*     Lipid Panel:   Recent Labs   Lab 05/16/22  0828   CHOL 148   LDLCALC 89.6   HDL 43   TRIG 77     Hgb A1C:   Recent Labs   Lab 05/16/22  0921   HGBA1C 5.8*     TSH:   Recent Labs   Lab 05/16/22 0828   TSH 1.393       Diagnostic Results     Brain/Vessel Imaging   CTA MP 5/16/22  -Large hemorrhage with surrounding edema involving the left thalamus and midbrain with intraventricular extension as detailed above.  Largest volume of blood is centered in the 3rd ventricle measuring up to 3.3 cm in transverse dimension  -Enlargement of the ventricular system with surrounding periventricular edema concerning for acute obstructive hydrocephalus.  -Trace subarachnoid hemorrhage along the left medial parietal lobe.  -Chronic microvascular ischemic change.  -Endotracheal tube is visualized, with tip at the right mainstem bronchus origin.  Retraction of the endotracheal tube is recommended.  -Trace right pleural effusion as well as patchy heterogeneous opacities throughout both lungs with right upper lobe predominance.  Findings are nonspecific, may represent infection or non infectious inflammatory process, with aspiration also a consideration.  -No aneurysm or vascular malformation.  -Mixed calcific and atheromatous atherosclerosis involving the carotid bulbs with approximately 30% stenosis at the right carotid bifurcation and approximately 50 stenosis at the left carotid bifurcation.      Cardiac Imaging   N/A

## 2022-05-18 NOTE — SUBJECTIVE & OBJECTIVE
Review of Systems  Unable to obtain a complete ROS due to level of consciousness/comfort care measures/morphine gtt  Objective:     Vitals:  Pulse: 79  BP: (!) 93/52  MAP (mmHg): 69  Resp: 17  SpO2: 97 %  O2 Device (Oxygen Therapy): room air    Temp  Min: 97.6 °F (36.4 °C)  Max: 97.7 °F (36.5 °C)  Pulse  Min: 53  Max: 80  BP  Min: 87/52  Max: 153/65  MAP (mmHg)  Min: 65  Max: 93  Resp  Min: 12  Max: 22  SpO2  Min: 97 %  Max: 100 %  Oxygen Concentration (%)  Min: 60  Max: 60    05/17 0701 - 05/18 0700  In: 345.6 [I.V.:234.6]  Out: 0    Unmeasured Output  Stool Occurrence: 1       Physical Exam  Deferred - patient on comfort care measures    Medications:  Continuousmorphine, Last Rate: 3 mg/hr (05/18/22 1206)    Scheduled PRNlorazepam, 1 mg, Q1H PRN      Today I personally reviewed pertinent medications, lines/drains/airways, imaging, cardiology results, laboratory results, microbiology results,    Diet  Diet NPO

## 2022-05-18 NOTE — ASSESSMENT & PLAN NOTE
The patient is unable to participate due to coma.        This meeting included discussion of the diagnosis, prognosis, and goals of care, was absolutely necessary for treatment decisions, and bore directly on the management of the patient.    It was explained to them the diagnosis and prognosis.  Giving her current neurological exam, CTH findings and poor baseline neurological status she is not expected to have a meaningful recovery. The family was cl;easr about her wishes and that she doesn't want be kept on life support. The family would like to concentrate on the comfort of the patient. Code status was made DNR with plans to start full comfort care measures this evening after the rest of the family come and see her.

## 2022-05-18 NOTE — ASSESSMENT & PLAN NOTE
Poor neurological exam  See goals of care discussion   5/18/2022: continue  Comfort care measures

## 2022-05-18 NOTE — PROGRESS NOTES
[facility]  Neurocritical Care  Progress Note    Admit Date: 5/16/2022  Service Date: 05/18/2022  Length of Stay: 2    Subjective:     Chief Complaint: IVH (intraventricular hemorrhage)    History of Present Illness: Kavya Figueroa is a 76 year old female with history of ESRD on dialysis, HTN, GERD, MI who presents by ems from the nursing home for AMS, found to have ICH/IVH. Patient nonresponsive unable to provide Hx, lives in nursing home, family at bedside.  Per charts: patient was last known normal last night at unknown time and this am when the patient was assessed by staff she was not responsive. On arrival to the ED the patient was unresponsive and code stroke was called. Patient was subsequently intubated for airway protection prior to imaging following discussion with the patients daughter who confirmed the patient was a full code and she did want the patient to be intubated. CTH with large ICH/IVH, poor exam.      Hospital Course: 05/17/2022 had a family meeting today. Please see assessment and plan \  05/18/2022: continue comfort care measures, pending hospice in coordination with case management          Review of Systems  Unable to obtain a complete ROS due to level of consciousness/comfort care measures/morphine gtt  Objective:     Vitals:  Pulse: 79  BP: (!) 93/52  MAP (mmHg): 69  Resp: 17  SpO2: 97 %  O2 Device (Oxygen Therapy): room air    Temp  Min: 97.6 °F (36.4 °C)  Max: 97.7 °F (36.5 °C)  Pulse  Min: 53  Max: 80  BP  Min: 87/52  Max: 153/65  MAP (mmHg)  Min: 65  Max: 93  Resp  Min: 12  Max: 22  SpO2  Min: 97 %  Max: 100 %  Oxygen Concentration (%)  Min: 60  Max: 60    05/17 0701 - 05/18 0700  In: 345.6 [I.V.:234.6]  Out: 0    Unmeasured Output  Stool Occurrence: 1       Physical Exam  Deferred - patient on comfort care measures    Medications:  Continuousmorphine, Last Rate: 3 mg/hr (05/18/22 1206)    Scheduled PRNlorazepam, 1 mg, Q1H PRN      Today I personally reviewed  pertinent medications, lines/drains/airways, imaging, cardiology results, laboratory results, microbiology results,    Diet  Diet NPO        Assessment/Plan:     Neuro  * IVH (intraventricular hemorrhage)  Poor neurological exam  See goals of care discussion   5/18/2022: continue  Comfort care measures        Palliative Care  Goals of care, counseling/discussion  The patient is unable to participate due to coma.        This meeting included discussion of the diagnosis, prognosis, and goals of care, was absolutely necessary for treatment decisions, and bore directly on the management of the patient.    It was explained to them the diagnosis and prognosis.  Giving her current neurological exam, CTH findings and poor baseline neurological status she is not expected to have a meaningful recovery. The family was cl;easr about her wishes and that she doesn't want be kept on life support. The family would like to concentrate on the comfort of the patient. Code status was made DNR with plans to start full comfort care measures this evening after the rest of the family come and see her.                The patient is being Prophylaxed for:  Venous Thromboembolism with: Mechanical  Stress Ulcer with: Not Applicable   Ventilator Pneumonia with: not applicable    Activity Orders          Diet NPO: NPO starting at 05/16 1312        DNR    Alix Buchanan NP  Neurocritical Care  [facility]

## 2022-05-18 NOTE — PLAN OF CARE
[facility] Care Plan    POC reviewed with Kavya Latanya Henry and family at 0300. Pt unable to verbalized understanding. Family and friends able to verbalize understanding. Questions and concerns addressed. No acute events overnight. Pt progressing toward goals. Will continue to monitor. See below and flowsheets for full assessment and VS info.     -Family approved to withdraw care. Withdrew care @2047.  -Morphine 2mg IVP given.   -Morphine gtt initiated.      Is this a stroke patient? yes- Stroke booklet reviewed with patient and family, risk factors identified for patient and stroke booklet remains at bedside for ongoing education.     Neuro:  Valerie Coma Scale  Best Eye Response: 1-->(E1) none  Best Motor Response: 4-->(M4) withdraws from pain  Best Verbal Response: 1-->(V1) none  [place] Coma Scale Score: 6  Assessment Qualifiers: patient intubated, patient not sedated/intubated  Pupil PERRLA: no     24hr Temp:  [96.62 °F (35.9 °C)-97.7 °F (36.5 °C)]     CV:   Rhythm: normal sinus rhythm  BP goals:   SBP < n/a  MAP > n/a    Resp:   O2 Device (Oxygen Therapy): ventilator  Vent Mode: A/C  Set Rate: 15 BPM  Oxygen Concentration (%): 60  Vt Set: 450 mL  PEEP/CPAP: 5 cmH20    Plan: N/A    GI/:     Diet/Nutrition Received: NPO  Last Bowel Movement: 05/17/22  Voiding Characteristics: oliguria    Intake/Output Summary (Last 24 hours) at 5/18/2022 0609  Last data filed at 5/17/2022 2005  Gross per 24 hour   Intake 385.91 ml   Output 0 ml   Net 385.91 ml     Unmeasured Output  Stool Occurrence: 1    Labs/Accuchecks:  Recent Labs   Lab 05/17/22 0415   WBC 18.85*   RBC 4.60   HGB 13.1   HCT 44.5         Recent Labs   Lab 05/17/22 0415      K 4.9   CO2 20*      BUN 57*   CREATININE 8.6*   ALKPHOS 99   ALT <5*   AST 13   BILITOT 0.9      Recent Labs   Lab 05/17/22 0415   INR 1.2   APTT 31.1      Recent Labs   Lab 05/16/22  1941   TROPONINI 0.273*       Electrolytes: Contraindicated  Accuchecks:  none    Gtts:   morphine 2 mg/hr (05/17/22 2048)       LDA/Wounds:  Lines/Drains/Airways       Central Venous Catheter Line  Duration                  Hemodialysis Catheter  right subclavian -- days    Percutaneous Central Line Insertion/Assessment - Triple Lumen  05/16/22 0910 right femoral vein 1 day              Drain  Duration                  Hemodialysis AV Fistula Left upper arm -- days         NG/OG Tube 05/16/22 0830 Cascade sump 18 Fr. 1 day              Airway  Duration                  Airway - Non-Surgical 05/16/22 0828 Endotracheal Tube 1 day                  Wounds: Yes  Wound care consulted: Yes

## 2022-05-18 NOTE — PLAN OF CARE
[facility] Care Plan    POC reviewed with Kavya Figueroa and family at 1400. Pt's daughter verbalized understanding. Questions and concerns addressed. No acute events today. Pt progressing toward goals. Will continue to monitor. See below and flowsheets for full assessment and VS info.             Is this a stroke patient? yes- Stroke booklet reviewed with family, risk factors identified for patient and stroke booklet remains at bedside for ongoing education.     Neuro:  [place] Coma Scale  Best Eye Response: 1-->(E1) none  Best Motor Response: 4-->(M4) withdraws from pain  Best Verbal Response: 1-->(V1) none  Valerie Coma Scale Score: 6  Assessment Qualifiers: patient intubated, patient not sedated/intubated  Pupil PERRLA: no     24 hr Temp:  [97.6 °F (36.4 °C)]     CV:   Rhythm: normal sinus rhythm  BP goals:   SBP < N/A  MAP > N/A    Resp:   O2 Device (Oxygen Therapy): room air  Vent Mode: A/C  Set Rate: 15 BPM  Oxygen Concentration (%): 60  Vt Set: 450 mL  PEEP/CPAP: 5 cmH20    Plan: N/A    GI/:     Diet/Nutrition Received: NPO  Last Bowel Movement: 05/17/22  Voiding Characteristics: oliguria    Intake/Output Summary (Last 24 hours) at 5/18/2022 1854  Last data filed at 5/18/2022 1848  Gross per 24 hour   Intake 92.12 ml   Output --   Net 92.12 ml     Unmeasured Output  Stool Occurrence: 1    Labs/Accuchecks:  Recent Labs   Lab 05/17/22 0415   WBC 18.85*   RBC 4.60   HGB 13.1   HCT 44.5         Recent Labs   Lab 05/17/22 0415      K 4.9   CO2 20*      BUN 57*   CREATININE 8.6*   ALKPHOS 99   ALT <5*   AST 13   BILITOT 0.9      Recent Labs   Lab 05/17/22 0415   INR 1.2   APTT 31.1      Recent Labs   Lab 05/16/22 1941   TROPONINI 0.273*       Electrolytes: Contraindicated  Accuchecks: none    Gtts:   morphine 4.5 mg/hr (05/18/22 1848)       LDA/Wounds:  Lines/Drains/Airways       Central Venous Catheter Line  Duration                  Hemodialysis Catheter  right subclavian -- days     Percutaneous Central Line Insertion/Assessment - Triple Lumen  05/16/22 0910 right femoral vein 2 days              Drain  Duration                  Hemodialysis AV Fistula Left upper arm -- days                  Wounds: Yes  Wound care consulted: No

## 2022-05-18 NOTE — PLAN OF CARE
SW advised by MD team that Pt is stable for hospice planning.     SW met with Pt daughter at bedside to discuss hospice options. Pt daughter reported she is more concerned with some issues that the Pt  is bringing up especially with regard to feeding and offering her food.  is telling  Her that per Pt Mosque, she should not be denied food. Pt daughter reported is tired of the questions and being caught in the middle. She is worried about violating her mother's Cheondo[employer] rights. SW reported request will be made to have the Yahaira call the Pt  to address the questions and once they are all in agreement, she can discuss hospice with this SW and plan can be moved forward. Left brochure for [facility] at bedside for her to review.    Aylin Easley LCSW  Neurocritical Care   [facility]  [id number]

## 2022-05-19 NOTE — PLAN OF CARE
SW met with Pt daughter at bedside. Discussed issues from yesterday. Pt daughter still trying to decide about hospice and still feeling very overwhelmed regarding concerns that were brought up by family friend/. She feels they maybe have a better understanding of the situation but now she has to think about hospice.     Aylin Easley, MARIBETH  Neurocritical Care   [facility]  [id number]

## 2022-05-19 NOTE — PROGRESS NOTES
[facility] - Neuro Critical Care  Neurocritical Care  Progress Note    Admit Date: 5/16/2022  Service Date: 05/19/2022  Length of Stay: 3    Subjective:     Chief Complaint: IVH (intraventricular hemorrhage)    History of Present Illness: Kavya Figueroa is a 76 year old female with history of ESRD on dialysis, HTN, GERD, MI who presents by ems from the nursing home for AMS, found to have ICH/IVH. Patient nonresponsive unable to provide Hx, lives in nursing home, family at bedside.  Per charts: patient was last known normal last night at unknown time and this am when the patient was assessed by staff she was not responsive. On arrival to the ED the patient was unresponsive and code stroke was called. Patient was subsequently intubated for airway protection prior to imaging following discussion with the patients daughter who confirmed the patient was a full code and she did want the patient to be intubated. CTH with large ICH/IVH, poor exam.      Hospital Course: 05/17/2022 had a family meeting today. Please see assessment and plan \  05/18/2022: continue comfort care measures, pending hospice in coordination with case management  05/19/2022: continue comfort care measures          Review of Systems  Unable to obtain a complete ROS due to level of consciousness.  Objective:     Vitals:  Temp: (!) 94 °F (34.4 °C)  Pulse: 78  BP: (!) 99/53  MAP (mmHg): 74  Resp: 18  SpO2: (!) 90 %  O2 Device (Oxygen Therapy): room air    Temp  Min: 94 °F (34.4 °C)  Max: 96.3 °F (35.7 °C)  Pulse  Min: 76  Max: 97  BP  Min: 91/52  Max: 106/53  MAP (mmHg)  Min: 66  Max: 74  Resp  Min: 9  Max: 25  SpO2  Min: 89 %  Max: 98 %    05/18 0701 - 05/19 0700  In: 68 [I.V.:68]  Out: -    Unmeasured Output  Stool Occurrence: 1       Physical Exam  Deferred- patient on comfort care measures    Medications:  Continuousmorphine, Last Rate: 5 mg/hr (05/18/22 2110)    Scheduled PRNlorazepam, 1 mg, Q1H PRN      Today I personally reviewed pertinent  medications, lines/drains/airways, imaging, cardiology results, laboratory results, microbiology results,     Diet  Diet NPO  Diet NPO        Assessment/Plan:     Neuro  * IVH (intraventricular hemorrhage)  Poor neurological exam  See goals of care discussion   5/19/2022: continue  Comfort care measures        Palliative Care  Goals of care, counseling/discussion  The patient is unable to participate due to coma.        This meeting included discussion of the diagnosis, prognosis, and goals of care, was absolutely necessary for treatment decisions, and bore directly on the management of the patient.    It was explained to them the diagnosis and prognosis.  Giving her current neurological exam, CTH findings and poor baseline neurological status she is not expected to have a meaningful recovery. The family was cl;easr about her wishes and that she doesn't want be kept on life support. The family would like to concentrate on the comfort of the patient. Code status was made DNR with plans to start full comfort care measures this evening after the rest of the family come and see her.    5/19/2022: continue comfort care measures                Activity Orders          Diet NPO: NPO starting at 05/16 1312        DNR    Alix Buchanan NP  Neurocritical Care  [facility]

## 2022-05-19 NOTE — ASSESSMENT & PLAN NOTE
The patient is unable to participate due to coma.        This meeting included discussion of the diagnosis, prognosis, and goals of care, was absolutely necessary for treatment decisions, and bore directly on the management of the patient.    It was explained to them the diagnosis and prognosis.  Giving her current neurological exam, CTH findings and poor baseline neurological status she is not expected to have a meaningful recovery. The family was cl;easr about her wishes and that she doesn't want be kept on life support. The family would like to concentrate on the comfort of the patient. Code status was made DNR with plans to start full comfort care measures this evening after the rest of the family come and see her.    5/19/2022: continue comfort care measures

## 2022-05-19 NOTE — ASSESSMENT & PLAN NOTE
Poor neurological exam  See goals of care discussion   5/19/2022: continue  Comfort care measures

## 2022-05-19 NOTE — SUBJECTIVE & OBJECTIVE
Review of Systems  Unable to obtain a complete ROS due to level of consciousness.  Objective:     Vitals:  Temp: (!) 94 °F (34.4 °C)  Pulse: 78  BP: (!) 99/53  MAP (mmHg): 74  Resp: 18  SpO2: (!) 90 %  O2 Device (Oxygen Therapy): room air    Temp  Min: 94 °F (34.4 °C)  Max: 96.3 °F (35.7 °C)  Pulse  Min: 76  Max: 97  BP  Min: 91/52  Max: 106/53  MAP (mmHg)  Min: 66  Max: 74  Resp  Min: 9  Max: 25  SpO2  Min: 89 %  Max: 98 %    05/18 0701 - 05/19 0700  In: 68 [I.V.:68]  Out: -    Unmeasured Output  Stool Occurrence: 1       Physical Exam  Deferred- patient on comfort care measures    Medications:  Continuousmorphine, Last Rate: 5 mg/hr (05/18/22 2110)    Scheduled PRNlorazepam, 1 mg, Q1H PRN      Today I personally reviewed pertinent medications, lines/drains/airways, imaging, cardiology results, laboratory results, microbiology results,     Diet  Diet NPO  Diet NPO

## 2022-05-20 NOTE — DISCHARGE SUMMARY
Death Note  Critical Care Medicine      Admit Date: 2022    Date of Death: 2022    Time of Death: 0003    Attending Physician: Elisabeth Alford MD    Principal Diagnoses: IVH (intraventricular hemorrhage)    Preliminary Cause of Death: IVH (intraventricular hemorrhage)    Secondary Diagnoses:   Active Hospital Problems    Diagnosis  POA    *IVH (intraventricular hemorrhage) [I61.5]  Yes    Goals of care, counseling/discussion [Z71.89]  Not Applicable      Resolved Hospital Problems   No resolved problems to display.        Discharged Condition:     HPI:  Kavya Figueroa is a 76 year old female with history of ESRD on dialysis, HTN, GERD, MI who presents by ems from the nursing home for AMS, found to have ICH/IVH. Patient nonresponsive unable to provide Hx, lives in nursing home, family at bedside.  Per charts: patient was last known normal last night at unknown time and this am when the patient was assessed by staff she was not responsive. On arrival to the ED the patient was unresponsive and code stroke was called. Patient was subsequently intubated for airway protection prior to imaging following discussion with the patients daughter who confirmed the patient was a full code and she did want the patient to be intubated. CTH with large ICH/IVH, poor exam.      Hospital/ICU Course:  2022 had a family meeting today. Please see assessment and plan \  2022: continue comfort care measures, pending hospice in coordination with case management  2022: continue comfort care measures          Consultations were held with the family regarding the patient's expected poor prognosis. At the direction of the family, the patient was extubated  and measures to ensure the comfort of the patient including, but not limited to, morphine as needed for pain and air hunger as well as benzodiazepines as needed for agitation. The patient was subsequently declared dead.

## 2022-05-20 NOTE — SIGNIFICANT EVENT
Death Note    There are no respiratory sounds on auscultation or visible chest rise. No cardiac sounds on auscultation. No palpable pulses were palpable in the carotid, radial, or pedal regions.  Pupils were non-reactive to light. No response to painful stimulation.  Tele strip reveals asystole. Pt was pronounced  at 0003 22.     Please send LEERs information to Neuro ICU attending Dr. Alford ([email or web address])    Zac Gallo, PGY4  [email or web address]  Pager: [phone]

## 2022-06-06 NOTE — PROGRESS NOTES
Subjective:      Patient ID: Kavya Figueroa is a 77 y.o. female.    Chief Complaint: Post-op Evaluation (Paz 3/2022)    Kavya is a 77 y.o. 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 The patient's chief complaint is  F.u s/p great toe amputation performed by me on 3/31/22. No issues w/ [facility]     Hemoglobin A1C   Date Value Ref Range Status   05/16/2022 5.8 (H) 4.0 - 5.6 % Final     Comment:     ADA Screening Guidelines:  5.7-6.4%  Consistent with prediabetes  >or=6.5%  Consistent with diabetes    High levels of fetal hemoglobin interfere with the HbA1C  assay. Heterozygous hemoglobin variants (HbS, HgC, etc)do  not significantly interfere with this assay.   However, presence of multiple variants may affect accuracy.     03/29/2022 6.5 (H) 4.0 - 5.6 % Final     Comment:     ADA Screening Guidelines:  5.7-6.4%  Consistent with prediabetes  >or=6.5%  Consistent with diabetes    High levels of fetal hemoglobin interfere with the HbA1C  assay. Heterozygous hemoglobin variants (HbS, HgC, etc)do  not significantly interfere with this assay.   However, presence of multiple variants may affect accuracy.     09/15/2021 5.9 (H) 4.0 - 5.6 % Final     Comment:     ADA Screening Guidelines:  5.7-6.4%  Consistent with prediabetes  >or=6.5%  Consistent with diabetes    High levels of fetal hemoglobin interfere with the HbA1C  assay. Heterozygous hemoglobin variants (HbS, HgC, etc)do  not significantly interfere with this assay.   However, presence of multiple  variants may affect accuracy.         Review of Systems   Constitutional: Negative for chills, decreased appetite and fever.   Cardiovascular: Negative for leg swelling.   Skin: Positive for dry skin and poor wound healing.   Musculoskeletal: Positive for arthritis and joint pain. Negative for joint swelling and myalgias.   Gastrointestinal: Negative for nausea and vomiting.   Neurological: Negative for loss of balance, numbness and paresthesias.         Patient Active Problem List   Diagnosis    Goals of care, counseling/discussion    Chronic diastolic congestive heart failure    Type 2 diabetes mellitus with diabetic nephropathy, with long-term current use of insulin    Dyslipidemia    Essential hypertension    Secondary hyperparathyroidism of renal origin    Anemia due to end stage renal disease    ESRD on hemodialysis    B12 deficiency    History of myocardial infarction    GERD (gastroesophageal reflux disease)    Chronic kidney disease-mineral and bone disorder    Acute hematogenous osteomyelitis of left foot    IVH (intraventricular hemorrhage)       Current Outpatient Medications on File Prior to Visit   Medication Sig Dispense Refill    acetaminophen (TYLENOL) 325 MG tablet Take 2 tablets (650 mg total) by mouth every 6 (six) hours as needed (for pain scale 1-4 or fever over 101).  0    apixaban (ELIQUIS) 2.5 mg Tab Take 1 tablet (2.5 mg total) by mouth 2 (two) times daily.      aspirin (ECOTRIN) 81 MG EC tablet Take 1 tablet (81 mg total) by mouth once daily. 30 tablet 3    atorvastatin (LIPITOR) 40 MG tablet Take 1 tablet (40 mg total) by mouth once daily. 30 tablet 3    calcium acetate,phosphat bind, (PHOSLO) 667 mg tablet Take 1 tablet (667 mg total) by mouth 3 (three) times daily with meals. 90 tablet 3    clopidogreL (PLAVIX) 75 mg tablet Take 1 tablet (75 mg total) by mouth once daily. 90 tablet 3    cyanocobalamin (VITAMIN B-12) 250 MCG tablet Take 1 tablet (250 mcg total) by  "mouth once daily. 90 tablet 3    famotidine (PEPCID) 20 MG tablet Take 1 tablet (20 mg total) by mouth once daily.      polyethylene glycol (GLYCOLAX) 17 gram PwPk Take 17 g by mouth 2 (two) times daily as needed (Constipation).  0    traMADoL (ULTRAM) 50 mg tablet Take 1 tablet (50 mg total) by mouth every 24 hours as needed for Pain. 90 tablet 0     No current facility-administered medications on file prior to visit.       Review of patient's allergies indicates:  No Known Allergies    Past Surgical History:   Procedure Laterality Date    DECLOTTING OF VASCULAR GRAFT Left 5/26/2021    Procedure: DECLOT-GRAFT;  Surgeon: REYNALDO Watters II, MD;  Location: [facility] OR [address] [place] [address];  Service: Cardiovascular;  Laterality: Left;  10.5 min  59.97 mGy  9.4910 Gy.cm  20ml Dye    FISTULOTOMY      TOE AMPUTATION Left 3/31/2022    Procedure: AMPUTATION, TOE LEFT;  Surgeon: Yennifer Matias DPM;  Location: [facility] OR [address] [place] [address];  Service: Podiatry;  Laterality: Left;    VENOPLASTY Left 5/26/2021    Procedure: ANGIOPLASTY, VEIN;  Surgeon: REYNALDO Watters II, MD;  Location: [facility] OR [address] [place] [address];  Service: Cardiovascular;  Laterality: Left;  balloon angioplasty       No family history on file.    Social History     Socioeconomic History    Marital status: Single   Tobacco Use    Smoking status: Never Smoker    Smokeless tobacco: Never Used   Substance and Sexual Activity    Alcohol use: Never    Drug use: Never    Sexual activity: Not Currently               Objective:       Vitals:    04/19/22 1257   BP: (!) 142/60   Pulse: 69   Weight: 69.2 kg (152 lb 8.9 oz)   Height: 5' 6" (1.676 m)   PainSc: 0-No pain    \    Physical Exam  Vitals reviewed.   Constitutional:       Appearance: She is well-developed.   Cardiovascular:      Comments: Dorsalis pedis and posterior tibial pulses are diminished Bilaterally. Toes are cool to touch. Feet are warm proximally.There is decreased digital hair. Skin is atrophic, slightly " hyperpigmented, and mildly edematous      Musculoskeletal:         General: No tenderness. Normal range of motion.      Comments: Adequate joint range of motion without pain, limitation, nor crepitation Bilateral feet and ankle joints. Muscle strength is 5/5 in all groups bilaterally.         Skin:     General: Skin is warm and dry.      Findings: No ecchymosis, erythema or lesion.      Comments: S/p L great toe amputation w/ sutures intact. No soi   Neurological:      Mental Status: She is alert and oriented to person, place, and time.      Comments: [place] 5.07 monofilamant testing is diminished Vipin feet. Sharp/dull sensation diminished Bilaterally. Light touch absent Bilaterally.       Psychiatric:         Behavior: Behavior normal.               Assessment:       Encounter Diagnoses   Name Primary?    Type 2 diabetes mellitus with diabetic nephropathy, with long-term current use of insulin Yes    Amputation of left great toe          Plan:       Kavya was seen today for post-op evaluation.    Diagnoses and all orders for this visit:    Type 2 diabetes mellitus with diabetic nephropathy, with long-term current use of insulin    Amputation of left great toe      I counseled the patient on her conditions, their implications and medical management.    L great toe amp well healed   Sutures  removed   RTC 4-6 weeks     .
